# Patient Record
Sex: FEMALE | Race: WHITE | NOT HISPANIC OR LATINO | URBAN - METROPOLITAN AREA
[De-identification: names, ages, dates, MRNs, and addresses within clinical notes are randomized per-mention and may not be internally consistent; named-entity substitution may affect disease eponyms.]

---

## 2017-02-01 ENCOUNTER — APPOINTMENT (RX ONLY)
Dept: URBAN - METROPOLITAN AREA CLINIC 23 | Facility: CLINIC | Age: 72
Setting detail: DERMATOLOGY
End: 2017-02-01

## 2017-02-01 DIAGNOSIS — L82.0 INFLAMED SEBORRHEIC KERATOSIS: ICD-10-CM

## 2017-02-01 DIAGNOSIS — D18.0 HEMANGIOMA: ICD-10-CM

## 2017-02-01 DIAGNOSIS — L82.1 OTHER SEBORRHEIC KERATOSIS: ICD-10-CM

## 2017-02-01 DIAGNOSIS — D22 MELANOCYTIC NEVI: ICD-10-CM

## 2017-02-01 DIAGNOSIS — L81.4 OTHER MELANIN HYPERPIGMENTATION: ICD-10-CM

## 2017-02-01 DIAGNOSIS — Z85.828 PERSONAL HISTORY OF OTHER MALIGNANT NEOPLASM OF SKIN: ICD-10-CM

## 2017-02-01 PROBLEM — I48.91 UNSPECIFIED ATRIAL FIBRILLATION: Status: ACTIVE | Noted: 2017-02-01

## 2017-02-01 PROBLEM — L57.0 ACTINIC KERATOSIS: Status: ACTIVE | Noted: 2017-02-01

## 2017-02-01 PROBLEM — D22.5 MELANOCYTIC NEVI OF TRUNK: Status: ACTIVE | Noted: 2017-02-01

## 2017-02-01 PROBLEM — D18.01 HEMANGIOMA OF SKIN AND SUBCUTANEOUS TISSUE: Status: ACTIVE | Noted: 2017-02-01

## 2017-02-01 PROBLEM — J30.1 ALLERGIC RHINITIS DUE TO POLLEN: Status: ACTIVE | Noted: 2017-02-01

## 2017-02-01 PROCEDURE — ? COUNSELING

## 2017-02-01 PROCEDURE — ? LIQUID NITROGEN

## 2017-02-01 PROCEDURE — 99214 OFFICE O/P EST MOD 30 MIN: CPT | Mod: 25

## 2017-02-01 PROCEDURE — 17110 DESTRUCTION B9 LES UP TO 14: CPT

## 2017-02-01 ASSESSMENT — LOCATION DETAILED DESCRIPTION DERM
LOCATION DETAILED: LEFT PROXIMAL CALF
LOCATION DETAILED: RIGHT DISTAL RADIAL DORSAL FOREARM
LOCATION DETAILED: PERIUMBILICAL SKIN
LOCATION DETAILED: LEFT SUPERIOR MEDIAL MIDBACK
LOCATION DETAILED: RIGHT POSTERIOR SHOULDER
LOCATION DETAILED: LEFT MID-UPPER BACK
LOCATION DETAILED: RIGHT INFERIOR MEDIAL UPPER BACK
LOCATION DETAILED: RIGHT LATERAL ABDOMEN
LOCATION DETAILED: LEFT PROXIMAL DORSAL FOREARM
LOCATION DETAILED: LEFT POSTERIOR SHOULDER
LOCATION DETAILED: RIGHT PROXIMAL CALF
LOCATION DETAILED: EPIGASTRIC SKIN

## 2017-02-01 ASSESSMENT — LOCATION SIMPLE DESCRIPTION DERM
LOCATION SIMPLE: LEFT CALF
LOCATION SIMPLE: LEFT FOREARM
LOCATION SIMPLE: RIGHT UPPER BACK
LOCATION SIMPLE: LEFT UPPER BACK
LOCATION SIMPLE: LEFT SHOULDER
LOCATION SIMPLE: RIGHT SHOULDER
LOCATION SIMPLE: RIGHT FOREARM
LOCATION SIMPLE: ABDOMEN
LOCATION SIMPLE: LEFT LOWER BACK
LOCATION SIMPLE: RIGHT CALF

## 2017-02-01 ASSESSMENT — LOCATION ZONE DERM
LOCATION ZONE: TRUNK
LOCATION ZONE: LEG
LOCATION ZONE: ARM

## 2017-02-01 NOTE — PROCEDURE: LIQUID NITROGEN
Medical Necessity Information: It is in your best interest to select a reason for this procedure from the list below. All of these items fulfill various CMS LCD requirements except the new and changing color options.
Duration Of Freeze Thaw-Cycle (Seconds): 0
Detail Level: Detailed
Medical Necessity Clause: This procedure was medically necessary because the lesions that were treated were:irritated
Consent: The patient's consent was obtained including but not limited to risks of crusting, scabbing, blistering, scarring, darker or lighter pigmentary change, recurrence, incomplete removal and infection.
Post-Care Instructions: I reviewed with the patient in detail post-care instructions. Patient is to wear sunprotection, and avoid picking at any of the treated lesions. Pt may apply Vaseline to crusted or scabbing areas.
Include Z78.9 (Other Specified Conditions Influencing Health Status) As An Associated Diagnosis?: No

## 2017-08-28 ENCOUNTER — HOSPITAL ENCOUNTER (OUTPATIENT)
Dept: GENERAL RADIOLOGY | Age: 72
Discharge: HOME | End: 2017-08-28
Payer: MEDICARE

## 2017-08-28 DIAGNOSIS — J40 BRONCHITIS: ICD-10-CM

## 2017-08-28 PROCEDURE — 71020 XR CHEST PA LAT: CPT

## 2017-10-12 ENCOUNTER — HOSPITAL ENCOUNTER (OUTPATIENT)
Dept: MAMMOGRAPHY | Age: 72
Discharge: HOME | End: 2017-10-12
Attending: OBSTETRICS & GYNECOLOGY
Payer: MEDICARE

## 2017-10-12 DIAGNOSIS — Z12.31 VISIT FOR SCREENING MAMMOGRAM: ICD-10-CM

## 2017-10-12 PROCEDURE — 77067 SCR MAMMO BI INCL CAD: CPT

## 2017-12-05 PROBLEM — G60.9 IDIOPATHIC PERIPHERAL NEUROPATHY: Status: ACTIVE | Noted: 2017-12-05

## 2018-01-31 ENCOUNTER — APPOINTMENT (RX ONLY)
Dept: URBAN - METROPOLITAN AREA CLINIC 23 | Facility: CLINIC | Age: 73
Setting detail: DERMATOLOGY
End: 2018-01-31

## 2018-01-31 DIAGNOSIS — Z85.828 PERSONAL HISTORY OF OTHER MALIGNANT NEOPLASM OF SKIN: ICD-10-CM

## 2018-01-31 DIAGNOSIS — L82.1 OTHER SEBORRHEIC KERATOSIS: ICD-10-CM

## 2018-01-31 DIAGNOSIS — L81.4 OTHER MELANIN HYPERPIGMENTATION: ICD-10-CM

## 2018-01-31 DIAGNOSIS — D485 NEOPLASM OF UNCERTAIN BEHAVIOR OF SKIN: ICD-10-CM

## 2018-01-31 PROBLEM — D48.5 NEOPLASM OF UNCERTAIN BEHAVIOR OF SKIN: Status: ACTIVE | Noted: 2018-01-31

## 2018-01-31 PROBLEM — I48.91 UNSPECIFIED ATRIAL FIBRILLATION: Status: ACTIVE | Noted: 2018-01-31

## 2018-01-31 PROCEDURE — 11101: CPT

## 2018-01-31 PROCEDURE — 99213 OFFICE O/P EST LOW 20 MIN: CPT | Mod: 25

## 2018-01-31 PROCEDURE — ? COUNSELING

## 2018-01-31 PROCEDURE — ? BIOPSY BY SHAVE METHOD

## 2018-01-31 PROCEDURE — 11100: CPT

## 2018-01-31 ASSESSMENT — LOCATION SIMPLE DESCRIPTION DERM
LOCATION SIMPLE: CHEST
LOCATION SIMPLE: RIGHT PRETIBIAL REGION
LOCATION SIMPLE: LEFT FOREARM
LOCATION SIMPLE: RIGHT FOREARM
LOCATION SIMPLE: RIGHT UPPER BACK
LOCATION SIMPLE: LEFT PRETIBIAL REGION

## 2018-01-31 ASSESSMENT — LOCATION DETAILED DESCRIPTION DERM
LOCATION DETAILED: LEFT PROXIMAL PRETIBIAL REGION
LOCATION DETAILED: LEFT PROXIMAL DORSAL FOREARM
LOCATION DETAILED: LEFT DISTAL DORSAL FOREARM
LOCATION DETAILED: RIGHT DISTAL PRETIBIAL REGION
LOCATION DETAILED: RIGHT PROXIMAL PRETIBIAL REGION
LOCATION DETAILED: LEFT LATERAL SUPERIOR CHEST
LOCATION DETAILED: RIGHT PROXIMAL DORSAL FOREARM
LOCATION DETAILED: RIGHT SUPERIOR MEDIAL UPPER BACK
LOCATION DETAILED: MIDDLE STERNUM
LOCATION DETAILED: RIGHT SUPERIOR UPPER BACK

## 2018-01-31 ASSESSMENT — LOCATION ZONE DERM
LOCATION ZONE: LEG
LOCATION ZONE: TRUNK
LOCATION ZONE: ARM

## 2018-01-31 NOTE — PROCEDURE: BIOPSY BY SHAVE METHOD
Electrodesiccation And Curettage Text: The wound bed was treated with electrodesiccation and curettage after the biopsy was performed.
Notification Instructions: Patient will be notified of biopsy results. However, patient instructed to call the office if not contacted within 2 weeks.
Electrodesiccation Text: The wound bed was treated with electrodesiccation after the biopsy was performed.
Billing Type: Third-Party Bill
Post-care instructions were reviewed in detail and written instructions are provided. Patient is to keep the biopsy site dry overnight, and then apply bacitracin twice daily until healed. Patient may apply hydrogen peroxide soaks to remove any crusting.
X Size Of Lesion In Cm: 0
Detail Level: Detailed
Accession #: [id number]
Destruction After The Procedure: No
Cryotherapy Text: The wound bed was treated with cryotherapy after the biopsy was performed.
Dressing: bandage
Silver Nitrate Text: The wound bed was treated with silver nitrate after the biopsy was performed.
Biopsy Type: H and E
Hemostasis: Aluminum Chloride
Anesthesia Volume In Cc: 0.3
Wound Care: Vaseline
Type Of Destruction Used: Curettage
Consent: Written consent was obtained and risks were reviewed including but not limited to scarring, infection, bleeding, scabbing, incomplete removal, and allergy to anesthesia.
Biopsy Method: Dermablade
Anesthesia Type: 1% lidocaine with epinephrine

## 2018-02-26 ENCOUNTER — HOSPITAL ENCOUNTER (OUTPATIENT)
Dept: LAB | Age: 73
Discharge: HOME | End: 2018-02-26
Payer: MEDICARE

## 2018-02-26 LAB
ALBUMIN SERPL-MCNC: 3.3 G/DL (ref 3.2–4.6)
ALBUMIN/GLOB SERPL: 1 {RATIO} (ref 1.2–3.5)
ALP SERPL-CCNC: 61 U/L (ref 50–136)
ALT SERPL-CCNC: 26 U/L (ref 12–65)
ANION GAP SERPL CALC-SCNC: 8 MMOL/L (ref 7–16)
AST SERPL-CCNC: 24 U/L (ref 15–37)
BASOPHILS # BLD: 0 K/UL (ref 0–0.2)
BASOPHILS NFR BLD: 0 % (ref 0–2)
BILIRUB SERPL-MCNC: 0.3 MG/DL (ref 0.2–1.1)
BUN SERPL-MCNC: 14 MG/DL (ref 8–23)
CALCIUM SERPL-MCNC: 8.9 MG/DL (ref 8.3–10.4)
CHLORIDE SERPL-SCNC: 106 MMOL/L (ref 98–107)
CO2 SERPL-SCNC: 27 MMOL/L (ref 21–32)
CREAT SERPL-MCNC: 0.73 MG/DL (ref 0.6–1)
DIFFERENTIAL METHOD BLD: ABNORMAL
EOSINOPHIL # BLD: 0 K/UL (ref 0–0.8)
EOSINOPHIL NFR BLD: 1 % (ref 0.5–7.8)
ERYTHROCYTE [DISTWIDTH] IN BLOOD BY AUTOMATED COUNT: 12.9 % (ref 11.9–14.6)
GLOBULIN SER CALC-MCNC: 3.2 G/DL (ref 2.3–3.5)
GLUCOSE SERPL-MCNC: 95 MG/DL (ref 65–100)
HCT VFR BLD AUTO: 25 % (ref 35.8–46.3)
HGB BLD-MCNC: 8 G/DL (ref 11.7–15.4)
IMM GRANULOCYTES # BLD: 0 K/UL (ref 0–0.5)
IMM GRANULOCYTES NFR BLD AUTO: 0 % (ref 0–5)
LYMPHOCYTES # BLD: 1.5 K/UL (ref 0.5–4.6)
LYMPHOCYTES NFR BLD: 30 % (ref 13–44)
MCH RBC QN AUTO: 30.8 PG (ref 26.1–32.9)
MCHC RBC AUTO-ENTMCNC: 32 G/DL (ref 31.4–35)
MCV RBC AUTO: 96.2 FL (ref 79.6–97.8)
MONOCYTES # BLD: 0.4 K/UL (ref 0.1–1.3)
MONOCYTES NFR BLD: 8 % (ref 4–12)
NEUTS SEG # BLD: 3 K/UL (ref 1.7–8.2)
NEUTS SEG NFR BLD: 61 % (ref 43–78)
PLATELET # BLD AUTO: 265 K/UL (ref 150–450)
PMV BLD AUTO: 9.6 FL (ref 10.8–14.1)
POTASSIUM SERPL-SCNC: 4 MMOL/L (ref 3.5–5.1)
PROT SERPL-MCNC: 6.5 G/DL (ref 6.3–8.2)
RBC # BLD AUTO: 2.6 M/UL (ref 4.05–5.25)
SODIUM SERPL-SCNC: 141 MMOL/L (ref 136–145)
WBC # BLD AUTO: 4.9 K/UL (ref 4.3–11.1)

## 2018-02-26 PROCEDURE — 36415 COLL VENOUS BLD VENIPUNCTURE: CPT | Performed by: INTERNAL MEDICINE

## 2018-02-26 PROCEDURE — 85025 COMPLETE CBC W/AUTO DIFF WBC: CPT | Performed by: INTERNAL MEDICINE

## 2018-02-26 PROCEDURE — 80053 COMPREHEN METABOLIC PANEL: CPT | Performed by: INTERNAL MEDICINE

## 2018-02-27 ENCOUNTER — HOSPITAL ENCOUNTER (OUTPATIENT)
Dept: INFUSION THERAPY | Age: 73
Discharge: HOME | End: 2018-02-27
Payer: MEDICARE

## 2018-02-27 VITALS
TEMPERATURE: 98.3 F | OXYGEN SATURATION: 99 % | SYSTOLIC BLOOD PRESSURE: 97 MMHG | DIASTOLIC BLOOD PRESSURE: 43 MMHG | BODY MASS INDEX: 25.85 KG/M2 | HEART RATE: 80 BPM | RESPIRATION RATE: 18 BRPM | WEIGHT: 170 LBS

## 2018-02-27 PROCEDURE — 36430 TRANSFUSION BLD/BLD COMPNT: CPT

## 2018-02-27 PROCEDURE — 74011250637 HC RX REV CODE- 250/637: Performed by: PHYSICIAN ASSISTANT

## 2018-02-27 PROCEDURE — 86900 BLOOD TYPING SEROLOGIC ABO: CPT | Performed by: INTERNAL MEDICINE

## 2018-02-27 PROCEDURE — 77030018667 ADMN ST IV BLD FENW -A

## 2018-02-27 PROCEDURE — 86870 RBC ANTIBODY IDENTIFICATION: CPT | Performed by: INTERNAL MEDICINE

## 2018-02-27 PROCEDURE — 86905 BLOOD TYPING RBC ANTIGENS: CPT | Performed by: INTERNAL MEDICINE

## 2018-02-27 PROCEDURE — 74011250636 HC RX REV CODE- 250/636: Performed by: INTERNAL MEDICINE

## 2018-02-27 PROCEDURE — P9016 RBC LEUKOCYTES REDUCED: HCPCS | Performed by: INTERNAL MEDICINE

## 2018-02-27 PROCEDURE — 86920 COMPATIBILITY TEST SPIN: CPT | Performed by: INTERNAL MEDICINE

## 2018-02-27 PROCEDURE — 86902 BLOOD TYPE ANTIGEN DONOR EA: CPT | Performed by: INTERNAL MEDICINE

## 2018-02-27 RX ORDER — ACETAMINOPHEN 325 MG/1
650 TABLET ORAL ONCE
Status: COMPLETED | OUTPATIENT
Start: 2018-02-27 | End: 2018-02-27

## 2018-02-27 RX ORDER — DIPHENHYDRAMINE HCL 25 MG
25 CAPSULE ORAL ONCE
Status: COMPLETED | OUTPATIENT
Start: 2018-02-27 | End: 2018-02-27

## 2018-02-27 RX ORDER — SODIUM CHLORIDE 9 MG/ML
250 INJECTION, SOLUTION INTRAVENOUS ONCE
Status: COMPLETED | OUTPATIENT
Start: 2018-02-27 | End: 2018-02-27

## 2018-02-27 RX ADMIN — ACETAMINOPHEN 650 MG: 325 TABLET, FILM COATED ORAL at 13:24

## 2018-02-27 RX ADMIN — SODIUM CHLORIDE 250 ML: 900 INJECTION, SOLUTION INTRAVENOUS at 13:20

## 2018-02-27 RX ADMIN — DIPHENHYDRAMINE HYDROCHLORIDE 25 MG: 25 CAPSULE ORAL at 13:24

## 2018-02-27 NOTE — PROGRESS NOTES
Pt arrived ambulatory today at 0751, to receive one unit of blood. Pt tolerated without difficulty. Patient discharged via ambulatory accompanied by spouse. Instructed to notify physician of any problems, questions or concerns. Allowed opportunity for patient/family to ask questions. Verbalized understanding. Next appointment is Feb 28 at 1000 with Dr. Shubham Salmeron.

## 2018-02-27 NOTE — PROGRESS NOTES
Saw patient in [address] [place] [address], stated no pains or concens for me, declined massage therapy service with appreciation. Gave patient a coupon for complimentary massage. Encouraged patient to call for assistance if needed with understanding expressed.  Thank you for this referral. Angle Grant

## 2018-02-28 LAB
ABO + RH BLD: NORMAL
ANTIGENS PRESENT BLD: NORMAL
ANTIGENS PRESENT RBC DONR: NORMAL
BLD PROD TYP BPU: NORMAL
BLOOD GROUP ANTIBODIES SERPL: NORMAL
BLOOD GROUP ANTIBODIES SERPL: NORMAL
BPU ID: NORMAL
CROSSMATCH RESULT,%XM: NORMAL
SPECIMEN EXP DATE BLD: NORMAL
STATUS OF UNIT,%ST: NORMAL
UNIT DIVISION, %UDIV: 0

## 2018-03-16 ENCOUNTER — APPOINTMENT (RX ONLY)
Dept: URBAN - METROPOLITAN AREA CLINIC 23 | Facility: CLINIC | Age: 73
Setting detail: DERMATOLOGY
End: 2018-03-16

## 2018-03-16 DIAGNOSIS — L57.0 ACTINIC KERATOSIS: ICD-10-CM

## 2018-03-16 PROBLEM — D04.71 CARCINOMA IN SITU OF SKIN OF RIGHT LOWER LIMB, INCLUDING HIP: Status: ACTIVE | Noted: 2018-03-16

## 2018-03-16 PROBLEM — Z85.828 PERSONAL HISTORY OF OTHER MALIGNANT NEOPLASM OF SKIN: Status: ACTIVE | Noted: 2018-03-16

## 2018-03-16 PROCEDURE — ? EXCISION

## 2018-03-16 PROCEDURE — 12032 INTMD RPR S/A/T/EXT 2.6-7.5: CPT

## 2018-03-16 PROCEDURE — 11602 EXC TR-EXT MAL+MARG 1.1-2 CM: CPT

## 2018-03-16 PROCEDURE — ? OTHER

## 2018-03-16 ASSESSMENT — LOCATION DETAILED DESCRIPTION DERM: LOCATION DETAILED: LEFT PROXIMAL PRETIBIAL REGION

## 2018-03-16 ASSESSMENT — LOCATION ZONE DERM: LOCATION ZONE: LEG

## 2018-03-16 ASSESSMENT — LOCATION SIMPLE DESCRIPTION DERM: LOCATION SIMPLE: LEFT PRETIBIAL REGION

## 2018-03-16 NOTE — PROCEDURE: EXCISION
Bilobed Flap Text: The defect edges were debeveled with a #15 scalpel blade.  Given the location of the defect and the proximity to free margins a bilobe flap was deemed most appropriate.  Using a sterile surgical marker, an appropriate bilobe flap drawn around the defect.    The area thus outlined was incised deep to adipose tissue with a #15 scalpel blade.  The skin margins were undermined to an appropriate distance in all directions utilizing iris scissors.
Complex Repair And Z Plasty Text: The defect edges were debeveled with a #15 scalpel blade.  The primary defect was closed partially with a complex linear closure.  Given the location of the remaining defect, shape of the defect and the proximity to free margins a Z plasty was deemed most appropriate for complete closure of the defect.  Using a sterile surgical marker, an appropriate advancement flap was drawn incorporating the defect and placing the expected incisions within the relaxed skin tension lines where possible.    The area thus outlined was incised deep to adipose tissue with a #15 scalpel blade.  The skin margins were undermined to an appropriate distance in all directions utilizing iris scissors.
Curvilinear Excision Additional Text (Leave Blank If You Do Not Want): The margin was drawn around the clinically apparent lesion.  A curvilinear shape was then drawn on the skin incorporating the lesion and margins.  Incisions were then made along these lines to the appropriate tissue plane and the lesion was extirpated.
Show Date Of Previous Biopsy Variable: Yes
Keystone Flap Text: The defect edges were debeveled with a #15 scalpel blade.  Given the location of the defect, shape of the defect a keystone flap was deemed most appropriate.  Using a sterile surgical marker, an appropriate keystone flap was drawn incorporating the defect, outlining the appropriate donor tissue and placing the expected incisions within the relaxed skin tension lines where possible. The area thus outlined was incised deep to adipose tissue with a #15 scalpel blade.  The skin margins were undermined to an appropriate distance in all directions around the primary defect and laterally outward around the flap utilizing iris scissors.
Bilobed Transposition Flap Text: The defect edges were debeveled with a #15 scalpel blade.  Given the location of the defect and the proximity to free margins a bilobed transposition flap was deemed most appropriate.  Using a sterile surgical marker, an appropriate bilobe flap drawn around the defect.    The area thus outlined was incised deep to adipose tissue with a #15 scalpel blade.  The skin margins were undermined to an appropriate distance in all directions utilizing iris scissors.
Anesthesia Type: 1% lidocaine with 1:100,000 epinephrine and 408mcg clindamycin/ml and a 1:10 solution of 8.4% sodium bicarbonate
Burow's Advancement Flap Text: The defect edges were debeveled with a #15 scalpel blade.  Given the location of the defect and the proximity to free margins a Burow's advancement flap was deemed most appropriate.  Using a sterile surgical marker, the appropriate advancement flap was drawn incorporating the defect and placing the expected incisions within the relaxed skin tension lines where possible.    The area thus outlined was incised deep to adipose tissue with a #15 scalpel blade.  The skin margins were undermined to an appropriate distance in all directions utilizing iris scissors.
No Repair - Repaired With Adjacent Surgical Defect Text (Leave Blank If You Do Not Want): After the excision the defect was repaired concurrently with another surgical defect which was in close approximation.
O-L Flap Text: The defect edges were debeveled with a #15 scalpel blade.  Given the location of the defect, shape of the defect and the proximity to free margins an O-L flap was deemed most appropriate.  Using a sterile surgical marker, an appropriate advancement flap was drawn incorporating the defect and placing the expected incisions within the relaxed skin tension lines where possible.    The area thus outlined was incised deep to adipose tissue with a #15 scalpel blade.  The skin margins were undermined to an appropriate distance in all directions utilizing iris scissors.
O-Z Plasty Text: The defect edges were debeveled with a #15 scalpel blade.  Given the location of the defect, shape of the defect and the proximity to free margins an O-Z plasty (double transposition flap) was deemed most appropriate.  Using a sterile surgical marker, the appropriate transposition flaps were drawn incorporating the defect and placing the expected incisions within the relaxed skin tension lines where possible.    The area thus outlined was incised deep to adipose tissue with a #15 scalpel blade.  The skin margins were undermined to an appropriate distance in all directions utilizing iris scissors.  Hemostasis was achieved with electrocautery.  The flaps were then transposed into place, one clockwise and the other counterclockwise, and anchored with interrupted buried subcutaneous sutures.
Anesthesia Volume In Cc: 5
O-T Advancement Flap Text: The defect edges were debeveled with a #15 scalpel blade.  Given the location of the defect, shape of the defect and the proximity to free margins an O-T advancement flap was deemed most appropriate.  Using a sterile surgical marker, an appropriate advancement flap was drawn incorporating the defect and placing the expected incisions within the relaxed skin tension lines where possible.    The area thus outlined was incised deep to adipose tissue with a #15 scalpel blade.  The skin margins were undermined to an appropriate distance in all directions utilizing iris scissors.
Hatchet Flap Text: The defect edges were debeveled with a #15 scalpel blade.  Given the location of the defect, shape of the defect and the proximity to free margins a hatchet flap was deemed most appropriate.  Using a sterile surgical marker, an appropriate hatchet flap was drawn incorporating the defect and placing the expected incisions within the relaxed skin tension lines where possible.    The area thus outlined was incised deep to adipose tissue with a #15 scalpel blade.  The skin margins were undermined to an appropriate distance in all directions utilizing iris scissors.
Complex Repair And Rhombic Flap Text: The defect edges were debeveled with a #15 scalpel blade.  The primary defect was closed partially with a complex linear closure.  Given the location of the remaining defect, shape of the defect and the proximity to free margins a rhombic flap was deemed most appropriate for complete closure of the defect.  Using a sterile surgical marker, an appropriate advancement flap was drawn incorporating the defect and placing the expected incisions within the relaxed skin tension lines where possible.    The area thus outlined was incised deep to adipose tissue with a #15 scalpel blade.  The skin margins were undermined to an appropriate distance in all directions utilizing iris scissors.
Lazy S Complex Repair Preamble Text (Leave Blank If You Do Not Want): Extensive wide undermining was performed.
Lazy S Intermediate Repair Preamble Text (Leave Blank If You Do Not Want): Undermining was performed with blunt dissection.
Size Of Lesion In Cm: 0.7
Secondary Defect Width (In Cm): 0
Epidermal Sutures: 4-0 Prolene
Slit Excision Additional Text (Leave Blank If You Do Not Want): A linear line was drawn on the skin overlying the lesion. An incision was made slowly until the lesion was visualized.  Once visualized, the lesion was removed with blunt dissection.
Epidermal Closure Graft Donor Site (Optional): simple interrupted
Excision Depth: adipose tissue
Lip Wedge Excision Repair Text: Given the location of the defect and the proximity to free margins a full thickness wedge repair was deemed most appropriate.  Using a sterile surgical marker, the appropriate repair was drawn incorporating the defect and placing the expected incisions perpendicular to the vermillion border.  The vermillion border was also meticulously outlined to ensure appropriate reapproximation during the repair.  The area thus outlined was incised through and through with a #15 scalpel blade.  The muscularis and dermis were reaproximated with deep sutures following hemostasis. Care was taken to realign the vermillion border before proceeding with the superficial closure.  Once the vermillion was realigned the superfical and mucosal closure was finished.
Repair Type: Intermediate
Cartilage Graft Text: The defect edges were debeveled with a #15 scalpel blade.  Given the location of the defect, shape of the defect, the fact the defect involved a full thickness cartilage defect a cartilage graft was deemed most appropriate.  An appropriate donor site was identified, cleansed, and anesthetized. The cartilage graft was then harvested and transferred to the recipient site, oriented appropriately and then sutured into place.  The secondary defect was then repaired using a primary closure.
Complex Repair And O-L Flap Text: The defect edges were debeveled with a #15 scalpel blade.  The primary defect was closed partially with a complex linear closure.  Given the location of the remaining defect, shape of the defect and the proximity to free margins an O-L flap was deemed most appropriate for complete closure of the defect.  Using a sterile surgical marker, an appropriate flap was drawn incorporating the defect and placing the expected incisions within the relaxed skin tension lines where possible.    The area thus outlined was incised deep to adipose tissue with a #15 scalpel blade.  The skin margins were undermined to an appropriate distance in all directions utilizing iris scissors.
Composite Graft Text: The defect edges were debeveled with a #15 scalpel blade.  Given the location of the defect, shape of the defect, the proximity to free margins and the fact the defect was full thickness a composite graft was deemed most appropriate.  The defect was outline and then transferred to the donor site.  A full thickness graft was then excised from the donor site. The graft was then placed in the primary defect, oriented appropriately and then sutured into place.  The secondary defect was then repaired using a primary closure.
Home Suture Removal Text: Patient was provided a home suture removal kit and will remove their sutures at home.  If they have any questions or difficulties they will call the office.
Mastoid Interpolation Flap Text: A decision was made to reconstruct the defect utilizing an interpolation axial flap and a staged reconstruction.  A telfa template was made of the defect.  This telfa template was then used to outline the mastoid interpolation flap.  The donor area for the pedicle flap was then injected with anesthesia.  The flap was excised through the skin and subcutaneous tissue down to the layer of the underlying musculature.  The pedicle flap was carefully excised within this deep plane to maintain its blood supply.  The edges of the donor site were undermined.   The donor site was closed in a primary fashion.  The pedicle was then rotated into position and sutured.  Once the tube was sutured into place, adequate blood supply was confirmed with blanching and refill.  The pedicle was then wrapped with xeroform gauze and dressed appropriately with a telfa and gauze bandage to ensure continued blood supply and protect the attached pedicle.
Patient Will Remove Sutures At Home?: No
Helical Rim Advancement Flap Text: The defect edges were debeveled with a #15 blade scalpel.  Given the location of the defect and the proximity to free margins (helical rim) a double helical rim advancement flap was deemed most appropriate.  Using a sterile surgical marker, the appropriate advancement flaps were drawn incorporating the defect and placing the expected incisions between the helical rim and antihelix where possible.  The area thus outlined was incised through and through with a #15 scalpel blade.  With a skin hook and iris scissors, the flaps were gently and sharply undermined and freed up.
Complex Repair And Rotation Flap Text: The defect edges were debeveled with a #15 scalpel blade.  The primary defect was closed partially with a complex linear closure.  Given the location of the remaining defect, shape of the defect and the proximity to free margins a rotation flap was deemed most appropriate for complete closure of the defect.  Using a sterile surgical marker, an appropriate advancement flap was drawn incorporating the defect and placing the expected incisions within the relaxed skin tension lines where possible.    The area thus outlined was incised deep to adipose tissue with a #15 scalpel blade.  The skin margins were undermined to an appropriate distance in all directions utilizing iris scissors.
Partial Purse String (Intermediate) Text: Given the location of the defect and the characteristics of the surrounding skin an intermediate purse string closure was deemed most appropriate.  Undermining was performed circumferentially around the surgical defect.  A purse string suture was then placed and tightened. Wound tension of the circular defect prevented complete closure of the wound.
Dermal Autograft Text: The defect edges were debeveled with a #15 scalpel blade.  Given the location of the defect, shape of the defect and the proximity to free margins a dermal autograft was deemed most appropriate.  Using a sterile surgical marker, the primary defect shape was transferred to the donor site. The area thus outlined was incised deep to adipose tissue with a #15 scalpel blade.  The harvested graft was then trimmed of adipose and epidermal tissue until only dermis was left.  The skin graft was then placed in the primary defect and oriented appropriately.
Muscle Hinge Flap Text: The defect edges were debeveled with a #15 scalpel blade.  Given the size, depth and location of the defect and the proximity to free margins a muscle hinge flap was deemed most appropriate.  Using a sterile surgical marker, an appropriate hinge flap was drawn incorporating the defect. The area thus outlined was incised with a #15 scalpel blade.  The skin margins were undermined to an appropriate distance in all directions utilizing iris scissors.
Star Wedge Flap Text: The defect edges were debeveled with a #15 scalpel blade.  Given the location of the defect, shape of the defect and the proximity to free margins a star wedge flap was deemed most appropriate.  Using a sterile surgical marker, an appropriate rotation flap was drawn incorporating the defect and placing the expected incisions within the relaxed skin tension lines where possible. The area thus outlined was incised deep to adipose tissue with a #15 scalpel blade.  The skin margins were undermined to an appropriate distance in all directions utilizing iris scissors.
Z Plasty Text: The lesion was extirpated to the level of the fat with a #15 scalpel blade.  Given the location of the defect, shape of the defect and the proximity to free margins a Z-plasty was deemed most appropriate for repair.  Using a sterile surgical marker, the appropriate transposition arms of the Z-plasty were drawn incorporating the defect and placing the expected incisions within the relaxed skin tension lines where possible.    The area thus outlined was incised deep to adipose tissue with a #15 scalpel blade.  The skin margins were undermined to an appropriate distance in all directions utilizing iris scissors.  The opposing transposition arms were then transposed into place in opposite direction and anchored with interrupted buried subcutaneous sutures.
Bilateral Helical Rim Advancement Flap Text: The defect edges were debeveled with a #15 blade scalpel.  Given the location of the defect and the proximity to free margins (helical rim) a bilateral helical rim advancement flap was deemed most appropriate.  Using a sterile surgical marker, the appropriate advancement flaps were drawn incorporating the defect and placing the expected incisions between the helical rim and antihelix where possible.  The area thus outlined was incised through and through with a #15 scalpel blade.  With a skin hook and iris scissors, the flaps were gently and sharply undermined and freed up.
Mucosal Advancement Flap Text: Given the location of the defect, shape of the defect and the proximity to free margins a mucosal advancement flap was deemed most appropriate. Incisions were made with a 15 blade scalpel in the appropriate fashion along the cutaneous vermillion border and the mucosal lip. The remaining actinically damaged mucosal tissue was excised.  The mucosal advancement flap was then elevated to the gingival sulcus with care taken to preserve the neurovascular structures and advanced into the primary defect. Care was taken to ensure that precise realignment of the vermillion border was achieved.
Complex Repair And Epidermal Autograft Text: The defect edges were debeveled with a #15 scalpel blade.  The primary defect was closed partially with a complex linear closure.  Given the location of the defect, shape of the defect and the proximity to free margins an epidermal autograft was deemed most appropriate to repair the remaining defect.  The graft was trimmed to fit the size of the remaining defect.  The graft was then placed in the primary defect, oriented appropriately, and sutured into place.
V-Y Flap Text: The defect edges were debeveled with a #15 scalpel blade.  Given the location of the defect, shape of the defect and the proximity to free margins a V-Y flap was deemed most appropriate.  Using a sterile surgical marker, an appropriate advancement flap was drawn incorporating the defect and placing the expected incisions within the relaxed skin tension lines where possible.    The area thus outlined was incised deep to adipose tissue with a #15 scalpel blade.  The skin margins were undermined to an appropriate distance in all directions utilizing iris scissors.
Detail Level: Detailed
Complex Repair And M Plasty Text: The defect edges were debeveled with a #15 scalpel blade.  The primary defect was closed partially with a complex linear closure.  Given the location of the remaining defect, shape of the defect and the proximity to free margins an M plasty was deemed most appropriate for complete closure of the defect.  Using a sterile surgical marker, an appropriate advancement flap was drawn incorporating the defect and placing the expected incisions within the relaxed skin tension lines where possible.    The area thus outlined was incised deep to adipose tissue with a #15 scalpel blade.  The skin margins were undermined to an appropriate distance in all directions utilizing iris scissors.
Hemostasis: Electrocautery
Paramedian Forehead Flap Text: A decision was made to reconstruct the defect utilizing an interpolation axial flap and a staged reconstruction.  A telfa template was made of the defect.  This telfa template was then used to outline the paramedian forehead pedicle flap.  The donor area for the pedicle flap was then injected with anesthesia.  The flap was excised through the skin and subcutaneous tissue down to the layer of the underlying musculature.  The pedicle flap was carefully excised within this deep plane to maintain its blood supply.  The edges of the donor site were undermined.   The donor site was closed in a primary fashion.  The pedicle was then rotated into position and sutured.  Once the tube was sutured into place, adequate blood supply was confirmed with blanching and refill.  The pedicle was then wrapped with xeroform gauze and dressed appropriately with a telfa and gauze bandage to ensure continued blood supply and protect the attached pedicle.
Advancement Flap (Double) Text: The defect edges were debeveled with a #15 scalpel blade.  Given the location of the defect and the proximity to free margins a double advancement flap was deemed most appropriate.  Using a sterile surgical marker, the appropriate advancement flaps were drawn incorporating the defect and placing the expected incisions within the relaxed skin tension lines where possible.    The area thus outlined was incised deep to adipose tissue with a #15 scalpel blade.  The skin margins were undermined to an appropriate distance in all directions utilizing iris scissors.
O-T Plasty Text: The defect edges were debeveled with a #15 scalpel blade.  Given the location of the defect, shape of the defect and the proximity to free margins an O-T plasty was deemed most appropriate.  Using a sterile surgical marker, an appropriate O-T plasty was drawn incorporating the defect and placing the expected incisions within the relaxed skin tension lines where possible.    The area thus outlined was incised deep to adipose tissue with a #15 scalpel blade.  The skin margins were undermined to an appropriate distance in all directions utilizing iris scissors.
Spiral Flap Text: The defect edges were debeveled with a #15 scalpel blade.  Given the location of the defect, shape of the defect and the proximity to free margins a spiral flap was deemed most appropriate.  Using a sterile surgical marker, an appropriate rotation flap was drawn incorporating the defect and placing the expected incisions within the relaxed skin tension lines where possible. The area thus outlined was incised deep to adipose tissue with a #15 scalpel blade.  The skin margins were undermined to an appropriate distance in all directions utilizing iris scissors.
Pre-Excision Curettage Text (Leave Blank If You Do Not Want): Prior to drawing the surgical margin the visible lesion was removed with electrodesiccation and curettage to clearly define the lesion size.
Accession #: [id number]
Tissue Cultured Epidermal Autograft Text: The defect edges were debeveled with a #15 scalpel blade.  Given the location of the defect, shape of the defect and the proximity to free margins a tissue cultured epidermal autograft was deemed most appropriate.  The graft was then trimmed to fit the size of the defect.  The graft was then placed in the primary defect and oriented appropriately.
Size Of Margin In Cm: 0.2
Epidermal Closure: running
Suture Removal: 14 days
Island Pedicle Flap Text: The defect edges were debeveled with a #15 scalpel blade.  Given the location of the defect, shape of the defect and the proximity to free margins an island pedicle advancement flap was deemed most appropriate.  Using a sterile surgical marker, an appropriate advancement flap was drawn incorporating the defect, outlining the appropriate donor tissue and placing the expected incisions within the relaxed skin tension lines where possible.    The area thus outlined was incised deep to adipose tissue with a #15 scalpel blade.  The skin margins were undermined to an appropriate distance in all directions around the primary defect and laterally outward around the island pedicle utilizing iris scissors.  There was minimal undermining beneath the pedicle flap.
Excision Method: Elliptical
Complex Repair And O-T Advancement Flap Text: The defect edges were debeveled with a #15 scalpel blade.  The primary defect was closed partially with a complex linear closure.  Given the location of the remaining defect, shape of the defect and the proximity to free margins an O-T advancement flap was deemed most appropriate for complete closure of the defect.  Using a sterile surgical marker, an appropriate advancement flap was drawn incorporating the defect and placing the expected incisions within the relaxed skin tension lines where possible.    The area thus outlined was incised deep to adipose tissue with a #15 scalpel blade.  The skin margins were undermined to an appropriate distance in all directions utilizing iris scissors.
Crescentic Advancement Flap Text: The defect edges were debeveled with a #15 scalpel blade.  Given the location of the defect and the proximity to free margins a crescentic advancement flap was deemed most appropriate.  Using a sterile surgical marker, the appropriate advancement flap was drawn incorporating the defect and placing the expected incisions within the relaxed skin tension lines where possible.    The area thus outlined was incised deep to adipose tissue with a #15 scalpel blade.  The skin margins were undermined to an appropriate distance in all directions utilizing iris scissors.
Positioning (Leave Blank If You Do Not Want): The patient was placed in a comfortable position exposing the surgical site.
Complex Repair And Xenograft Text: The defect edges were debeveled with a #15 scalpel blade.  The primary defect was closed partially with a complex linear closure.  Given the location of the defect, shape of the defect and the proximity to free margins an tissue cultured epidermal autograft was deemed most appropriate to repair the remaining defect.  The graft was trimmed to fit the size of the remaining defect.  The graft was then placed in the primary defect, oriented appropriately, and sutured into place.
Dorsal Nasal Flap Text: The defect edges were debeveled with a #15 scalpel blade.  Given the location of the defect and the proximity to free margins a dorsal nasal flap was deemed most appropriate.  Using a sterile surgical marker, an appropriate dorsal nasal flap was drawn around the defect.    The area thus outlined was incised deep to adipose tissue with a #15 scalpel blade.  The skin margins were undermined to an appropriate distance in all directions utilizing iris scissors.
Melolabial Transposition Flap Text: The defect edges were debeveled with a #15 scalpel blade.  Given the location of the defect and the proximity to free margins a melolabial flap was deemed most appropriate.  Using a sterile surgical marker, an appropriate melolabial transposition flap was drawn incorporating the defect.    The area thus outlined was incised deep to adipose tissue with a #15 scalpel blade.  The skin margins were undermined to an appropriate distance in all directions utilizing iris scissors.
Complex Repair And Transposition Flap Text: The defect edges were debeveled with a #15 scalpel blade.  The primary defect was closed partially with a complex linear closure.  Given the location of the remaining defect, shape of the defect and the proximity to free margins a transposition flap was deemed most appropriate for complete closure of the defect.  Using a sterile surgical marker, an appropriate advancement flap was drawn incorporating the defect and placing the expected incisions within the relaxed skin tension lines where possible.    The area thus outlined was incised deep to adipose tissue with a #15 scalpel blade.  The skin margins were undermined to an appropriate distance in all directions utilizing iris scissors.
Elliptical Excision Additional Text (Leave Blank If You Do Not Want): The margin was drawn around the clinically apparent lesion.  An elliptical shape was then drawn on the skin incorporating the lesion and margins.  Incisions were then made along these lines to the appropriate tissue plane and the lesion was extirpated.
Complex Repair And Ftsg Text: The defect edges were debeveled with a #15 scalpel blade.  The primary defect was closed partially with a complex linear closure.  Given the location of the defect, shape of the defect and the proximity to free margins a full thickness skin graft was deemed most appropriate to repair the remaining defect.  The graft was trimmed to fit the size of the remaining defect.  The graft was then placed in the primary defect, oriented appropriately, and sutured into place.
Cheek Interpolation Flap Text: A decision was made to reconstruct the defect utilizing an interpolation axial flap and a staged reconstruction.  A telfa template was made of the defect.  This telfa template was then used to outline the Cheek Interpolation flap.  The donor area for the pedicle flap was then injected with anesthesia.  The flap was excised through the skin and subcutaneous tissue down to the layer of the underlying musculature.  The interpolation flap was carefully excised within this deep plane to maintain its blood supply.  The edges of the donor site were undermined.   The donor site was closed in a primary fashion.  The pedicle was then rotated into position and sutured.  Once the tube was sutured into place, adequate blood supply was confirmed with blanching and refill.  The pedicle was then wrapped with xeroform gauze and dressed appropriately with a telfa and gauze bandage to ensure continued blood supply and protect the attached pedicle.
Posterior Auricular Interpolation Flap Text: A decision was made to reconstruct the defect utilizing an interpolation axial flap and a staged reconstruction.  A telfa template was made of the defect.  This telfa template was then used to outline the posterior auricular interpolation flap.  The donor area for the pedicle flap was then injected with anesthesia.  The flap was excised through the skin and subcutaneous tissue down to the layer of the underlying musculature.  The pedicle flap was carefully excised within this deep plane to maintain its blood supply.  The edges of the donor site were undermined.   The donor site was closed in a primary fashion.  The pedicle was then rotated into position and sutured.  Once the tube was sutured into place, adequate blood supply was confirmed with blanching and refill.  The pedicle was then wrapped with xeroform gauze and dressed appropriately with a telfa and gauze bandage to ensure continued blood supply and protect the attached pedicle.
Island Pedicle Flap-Requiring Vessel Identification Text: The defect edges were debeveled with a #15 scalpel blade.  Given the location of the defect, shape of the defect and the proximity to free margins an island pedicle advancement flap was deemed most appropriate.  Using a sterile surgical marker, an appropriate advancement flap was drawn, based on the axial vessel mentioned above, incorporating the defect, outlining the appropriate donor tissue and placing the expected incisions within the relaxed skin tension lines where possible.    The area thus outlined was incised deep to adipose tissue with a #15 scalpel blade.  The skin margins were undermined to an appropriate distance in all directions around the primary defect and laterally outward around the island pedicle utilizing iris scissors.  There was minimal undermining beneath the pedicle flap.
Graft Donor Site Bandage (Optional-Leave Blank If You Don't Want In Note): Steri-strips and a pressure bandage were applied to the donor site.
Complex Repair And Double Advancement Flap Text: The defect edges were debeveled with a #15 scalpel blade.  The primary defect was closed partially with a complex linear closure.  Given the location of the remaining defect, shape of the defect and the proximity to free margins a double advancement flap was deemed most appropriate for complete closure of the defect.  Using a sterile surgical marker, an appropriate advancement flap was drawn incorporating the defect and placing the expected incisions within the relaxed skin tension lines where possible.    The area thus outlined was incised deep to adipose tissue with a #15 scalpel blade.  The skin margins were undermined to an appropriate distance in all directions utilizing iris scissors.
Complex Repair And W Plasty Text: The defect edges were debeveled with a #15 scalpel blade.  The primary defect was closed partially with a complex linear closure.  Given the location of the remaining defect, shape of the defect and the proximity to free margins a W plasty was deemed most appropriate for complete closure of the defect.  Using a sterile surgical marker, an appropriate advancement flap was drawn incorporating the defect and placing the expected incisions within the relaxed skin tension lines where possible.    The area thus outlined was incised deep to adipose tissue with a #15 scalpel blade.  The skin margins were undermined to an appropriate distance in all directions utilizing iris scissors.
Rotation Flap Text: The defect edges were debeveled with a #15 scalpel blade.  Given the location of the defect, shape of the defect and the proximity to free margins a rotation flap was deemed most appropriate.  Using a sterile surgical marker, an appropriate rotation flap was drawn incorporating the defect and placing the expected incisions within the relaxed skin tension lines where possible.    The area thus outlined was incised deep to adipose tissue with a #15 scalpel blade.  The skin margins were undermined to an appropriate distance in all directions utilizing iris scissors.
Double Island Pedicle Flap Text: The defect edges were debeveled with a #15 scalpel blade.  Given the location of the defect, shape of the defect and the proximity to free margins a double island pedicle advancement flap was deemed most appropriate.  Using a sterile surgical marker, an appropriate advancement flap was drawn incorporating the defect, outlining the appropriate donor tissue and placing the expected incisions within the relaxed skin tension lines where possible.    The area thus outlined was incised deep to adipose tissue with a #15 scalpel blade.  The skin margins were undermined to an appropriate distance in all directions around the primary defect and laterally outward around the island pedicle utilizing iris scissors.  There was minimal undermining beneath the pedicle flap.
W Plasty Text: The lesion was extirpated to the level of the fat with a #15 scalpel blade.  Given the location of the defect, shape of the defect and the proximity to free margins a W-plasty was deemed most appropriate for repair.  Using a sterile surgical marker, the appropriate transposition arms of the W-plasty were drawn incorporating the defect and placing the expected incisions within the relaxed skin tension lines where possible.    The area thus outlined was incised deep to adipose tissue with a #15 scalpel blade.  The skin margins were undermined to an appropriate distance in all directions utilizing iris scissors.  The opposing transposition arms were then transposed into place in opposite direction and anchored with interrupted buried subcutaneous sutures.
Bi-Rhombic Flap Text: The defect edges were debeveled with a #15 scalpel blade.  Given the location of the defect and the proximity to free margins a bi-rhombic flap was deemed most appropriate.  Using a sterile surgical marker, an appropriate rhombic flap was drawn incorporating the defect. The area thus outlined was incised deep to adipose tissue with a #15 scalpel blade.  The skin margins were undermined to an appropriate distance in all directions utilizing iris scissors.
Transposition Flap Text: The defect edges were debeveled with a #15 scalpel blade.  Given the location of the defect and the proximity to free margins a transposition flap was deemed most appropriate.  Using a sterile surgical marker, an appropriate transposition flap was drawn incorporating the defect.    The area thus outlined was incised deep to adipose tissue with a #15 scalpel blade.  The skin margins were undermined to an appropriate distance in all directions utilizing iris scissors.
Epidermal Autograft Text: The defect edges were debeveled with a #15 scalpel blade.  Given the location of the defect, shape of the defect and the proximity to free margins an epidermal autograft was deemed most appropriate.  Using a sterile surgical marker, the primary defect shape was transferred to the donor site. The epidermal graft was then harvested.  The skin graft was then placed in the primary defect and oriented appropriately.
Consent was obtained from the patient. The risks and benefits to therapy were discussed in detail. Specifically, the risks of infection, scarring, bleeding, prolonged wound healing, incomplete removal, allergy to anesthesia, nerve injury and recurrence were addressed. Prior to the procedure, the treatment site was clearly identified and confirmed by the patient. All components of Universal Protocol/PAUSE Rule completed.
Deep Sutures: 4-0 Vicryl
Complex Repair And A-T Advancement Flap Text: The defect edges were debeveled with a #15 scalpel blade.  The primary defect was closed partially with a complex linear closure.  Given the location of the remaining defect, shape of the defect and the proximity to free margins an A-T advancement flap was deemed most appropriate for complete closure of the defect.  Using a sterile surgical marker, an appropriate advancement flap was drawn incorporating the defect and placing the expected incisions within the relaxed skin tension lines where possible.    The area thus outlined was incised deep to adipose tissue with a #15 scalpel blade.  The skin margins were undermined to an appropriate distance in all directions utilizing iris scissors.
Partial Purse String (Simple) Text: Given the location of the defect and the characteristics of the surrounding skin a simple purse string closure was deemed most appropriate.  Undermining was performed circumferentially around the surgical defect.  A purse string suture was then placed and tightened. Wound tension of the circular defect prevented complete closure of the wound.
H Plasty Text: Given the location of the defect, shape of the defect and the proximity to free margins a H-plasty was deemed most appropriate for repair.  Using a sterile surgical marker, the appropriate advancement arms of the H-plasty were drawn incorporating the defect and placing the expected incisions within the relaxed skin tension lines where possible. The area thus outlined was incised deep to adipose tissue with a #15 scalpel blade. The skin margins were undermined to an appropriate distance in all directions utilizing iris scissors.  The opposing advancement arms were then advanced into place in opposite direction and anchored with interrupted buried subcutaneous sutures.
Complex Repair And Single Advancement Flap Text: The defect edges were debeveled with a #15 scalpel blade.  The primary defect was closed partially with a complex linear closure.  Given the location of the remaining defect, shape of the defect and the proximity to free margins a single advancement flap was deemed most appropriate for complete closure of the defect.  Using a sterile surgical marker, an appropriate advancement flap was drawn incorporating the defect and placing the expected incisions within the relaxed skin tension lines where possible.    The area thus outlined was incised deep to adipose tissue with a #15 scalpel blade.  The skin margins were undermined to an appropriate distance in all directions utilizing iris scissors.
Purse String (Intermediate) Text: Given the location of the defect and the characteristics of the surrounding skin a pursestring intermediate closure was deemed most appropriate.  Undermining was performed circumfirentially around the surgical defect.  A purstring suture was then placed and tightened.
Wound Care: Vaseline
Complex Repair And Double M Plasty Text: The defect edges were debeveled with a #15 scalpel blade.  The primary defect was closed partially with a complex linear closure.  Given the location of the remaining defect, shape of the defect and the proximity to free margins a double M plasty was deemed most appropriate for complete closure of the defect.  Using a sterile surgical marker, an appropriate advancement flap was drawn incorporating the defect and placing the expected incisions within the relaxed skin tension lines where possible.    The area thus outlined was incised deep to adipose tissue with a #15 scalpel blade.  The skin margins were undermined to an appropriate distance in all directions utilizing iris scissors.
Complex Repair And V-Y Plasty Text: The defect edges were debeveled with a #15 scalpel blade.  The primary defect was closed partially with a complex linear closure.  Given the location of the remaining defect, shape of the defect and the proximity to free margins a V-Y plasty was deemed most appropriate for complete closure of the defect.  Using a sterile surgical marker, an appropriate advancement flap was drawn incorporating the defect and placing the expected incisions within the relaxed skin tension lines where possible.    The area thus outlined was incised deep to adipose tissue with a #15 scalpel blade.  The skin margins were undermined to an appropriate distance in all directions utilizing iris scissors.
Skin Substitute Text: The defect edges were debeveled with a #15 scalpel blade.  Given the location of the defect, shape of the defect and the proximity to free margins a skin substitute graft was deemed most appropriate.  The graft material was trimmed to fit the size of the defect. The graft was then placed in the primary defect and oriented appropriately.
Modified Advancement Flap Text: The defect edges were debeveled with a #15 scalpel blade.  Given the location of the defect, shape of the defect and the proximity to free margins a modified advancement flap was deemed most appropriate.  Using a sterile surgical marker, an appropriate advancement flap was drawn incorporating the defect and placing the expected incisions within the relaxed skin tension lines where possible.    The area thus outlined was incised deep to adipose tissue with a #15 scalpel blade.  The skin margins were undermined to an appropriate distance in all directions utilizing iris scissors.
Complex Repair And Split-Thickness Skin Graft Text: The defect edges were debeveled with a #15 scalpel blade.  The primary defect was closed partially with a complex linear closure.  Given the location of the defect, shape of the defect and the proximity to free margins a split thickness skin graft was deemed most appropriate to repair the remaining defect.  The graft was trimmed to fit the size of the remaining defect.  The graft was then placed in the primary defect, oriented appropriately, and sutured into place.
Melolabial Interpolation Flap Text: A decision was made to reconstruct the defect utilizing an interpolation axial flap and a staged reconstruction.  A telfa template was made of the defect.  This telfa template was then used to outline the melolabial interpolation flap.  The donor area for the pedicle flap was then injected with anesthesia.  The flap was excised through the skin and subcutaneous tissue down to the layer of the underlying musculature.  The pedicle flap was carefully excised within this deep plane to maintain its blood supply.  The edges of the donor site were undermined.   The donor site was closed in a primary fashion.  The pedicle was then rotated into position and sutured.  Once the tube was sutured into place, adequate blood supply was confirmed with blanching and refill.  The pedicle was then wrapped with xeroform gauze and dressed appropriately with a telfa and gauze bandage to ensure continued blood supply and protect the attached pedicle.
Complex Repair And Modified Advancement Flap Text: The defect edges were debeveled with a #15 scalpel blade.  The primary defect was closed partially with a complex linear closure.  Given the location of the remaining defect, shape of the defect and the proximity to free margins a modified advancement flap was deemed most appropriate for complete closure of the defect.  Using a sterile surgical marker, an appropriate advancement flap was drawn incorporating the defect and placing the expected incisions within the relaxed skin tension lines where possible.    The area thus outlined was incised deep to adipose tissue with a #15 scalpel blade.  The skin margins were undermined to an appropriate distance in all directions utilizing iris scissors.
Complex Repair And Dermal Autograft Text: The defect edges were debeveled with a #15 scalpel blade.  The primary defect was closed partially with a complex linear closure.  Given the location of the defect, shape of the defect and the proximity to free margins an dermal autograft was deemed most appropriate to repair the remaining defect.  The graft was trimmed to fit the size of the remaining defect.  The graft was then placed in the primary defect, oriented appropriately, and sutured into place.
Rhombic Flap Text: The defect edges were debeveled with a #15 scalpel blade.  Given the location of the defect and the proximity to free margins a rhombic flap was deemed most appropriate.  Using a sterile surgical marker, an appropriate rhombic flap was drawn incorporating the defect.    The area thus outlined was incised deep to adipose tissue with a #15 scalpel blade.  The skin margins were undermined to an appropriate distance in all directions utilizing iris scissors.
Intermediate / Complex Repair - Final Wound Length In Cm: 3.6
Split-Thickness Skin Graft Text: The defect edges were debeveled with a #15 scalpel blade.  Given the location of the defect, shape of the defect and the proximity to free margins a split thickness skin graft was deemed most appropriate.  Using a sterile surgical marker, the primary defect shape was transferred to the donor site. The split thickness graft was then harvested.  The skin graft was then placed in the primary defect and oriented appropriately.
V-Y Plasty Text: The defect edges were debeveled with a #15 scalpel blade.  Given the location of the defect, shape of the defect and the proximity to free margins an V-Y advancement flap was deemed most appropriate.  Using a sterile surgical marker, an appropriate advancement flap was drawn incorporating the defect and placing the expected incisions within the relaxed skin tension lines where possible.    The area thus outlined was incised deep to adipose tissue with a #15 scalpel blade.  The skin margins were undermined to an appropriate distance in all directions utilizing iris scissors.
Alar Island Pedicle Flap Text: The defect edges were debeveled with a #15 scalpel blade.  Given the location of the defect, shape of the defect and the proximity to the alar rim an island pedicle advancement flap was deemed most appropriate.  Using a sterile surgical marker, an appropriate advancement flap was drawn incorporating the defect, outlining the appropriate donor tissue and placing the expected incisions within the nasal ala running parallel to the alar rim. The area thus outlined was incised with a #15 scalpel blade.  The skin margins were undermined minimally to an appropriate distance in all directions around the primary defect and laterally outward around the island pedicle utilizing iris scissors.  There was minimal undermining beneath the pedicle flap.
Advancement Flap (Single) Text: The defect edges were debeveled with a #15 scalpel blade.  Given the location of the defect and the proximity to free margins a single advancement flap was deemed most appropriate.  Using a sterile surgical marker, an appropriate advancement flap was drawn incorporating the defect and placing the expected incisions within the relaxed skin tension lines where possible.    The area thus outlined was incised deep to adipose tissue with a #15 scalpel blade.  The skin margins were undermined to an appropriate distance in all directions utilizing iris scissors.
A-T Advancement Flap Text: The defect edges were debeveled with a #15 scalpel blade.  Given the location of the defect, shape of the defect and the proximity to free margins an A-T advancement flap was deemed most appropriate.  Using a sterile surgical marker, an appropriate advancement flap was drawn incorporating the defect and placing the expected incisions within the relaxed skin tension lines where possible.    The area thus outlined was incised deep to adipose tissue with a #15 scalpel blade.  The skin margins were undermined to an appropriate distance in all directions utilizing iris scissors.
Billing Type: Third-Party Bill
Purse String (Simple) Text: Given the location of the defect and the characteristics of the surrounding skin a purse string simple closure was deemed most appropriate.  Undermining was performed circumferentially around the surgical defect.  A purse string suture was then placed and tightened.
Advancement-Rotation Flap Text: The defect edges were debeveled with a #15 scalpel blade.  Given the location of the defect, shape of the defect and the proximity to free margins an advancement-rotation flap was deemed most appropriate.  Using a sterile surgical marker, an appropriate flap was drawn incorporating the defect and placing the expected incisions within the relaxed skin tension lines where possible. The area thus outlined was incised deep to adipose tissue with a #15 scalpel blade.  The skin margins were undermined to an appropriate distance in all directions utilizing iris scissors.
Perilesional Excision Additional Text (Leave Blank If You Do Not Want): The margin was drawn around the clinically apparent lesion. Incisions were then made along these lines to the appropriate tissue plane and the lesion was extirpated.
Trilobed Flap Text: The defect edges were debeveled with a #15 scalpel blade.  Given the location of the defect and the proximity to free margins a trilobed flap was deemed most appropriate.  Using a sterile surgical marker, an appropriate trilobed flap drawn around the defect.    The area thus outlined was incised deep to adipose tissue with a #15 scalpel blade.  The skin margins were undermined to an appropriate distance in all directions utilizing iris scissors.
Scalpel Size: 15 blade
Path Notes (To The Dermatopathologist): Please check margins
Xenograft Text: The defect edges were debeveled with a #15 scalpel blade.  Given the location of the defect, shape of the defect and the proximity to free margins a xenograft was deemed most appropriate.  The graft was then trimmed to fit the size of the defect.  The graft was then placed in the primary defect and oriented appropriately.
Ftsg Text: The defect edges were debeveled with a #15 scalpel blade.  Given the location of the defect, shape of the defect and the proximity to free margins a full thickness skin graft was deemed most appropriate.  Using a sterile surgical marker, the primary defect shape was transferred to the donor site. The area thus outlined was incised deep to adipose tissue with a #15 scalpel blade.  The harvested graft was then trimmed of adipose tissue until only dermis and epidermis was left.  The skin margins of the secondary defect were undermined to an appropriate distance in all directions utilizing iris scissors.  The secondary defect was closed with interrupted buried subcutaneous sutures.  The skin edges were then re-apposed with running  sutures.  The skin graft was then placed in the primary defect and oriented appropriately.
Complex Repair And Bilobe Flap Text: The defect edges were debeveled with a #15 scalpel blade.  The primary defect was closed partially with a complex linear closure.  Given the location of the remaining defect, shape of the defect and the proximity to free margins a bilobe flap was deemed most appropriate for complete closure of the defect.  Using a sterile surgical marker, an appropriate advancement flap was drawn incorporating the defect and placing the expected incisions within the relaxed skin tension lines where possible.    The area thus outlined was incised deep to adipose tissue with a #15 scalpel blade.  The skin margins were undermined to an appropriate distance in all directions utilizing iris scissors.
Complex Repair And Skin Substitute Graft Text: The defect edges were debeveled with a #15 scalpel blade.  The primary defect was closed partially with a complex linear closure.  Given the location of the remaining defect, shape of the defect and the proximity to free margins a skin substitute graft was deemed most appropriate to repair the remaining defect.  The graft was trimmed to fit the size of the remaining defect.  The graft was then placed in the primary defect, oriented appropriately, and sutured into place.
Dressing: pressure dressing
Estimated Blood Loss (Cc): minimal
S Plasty Text: Given the location and shape of the defect, and the orientation of relaxed skin tension lines, an S-plasty was deemed most appropriate for repair.  Using a sterile surgical marker, the appropriate outline of the S-plasty was drawn, incorporating the defect and placing the expected incisions within the relaxed skin tension lines where possible.  The area thus outlined was incised deep to adipose tissue with a #15 scalpel blade.  The skin margins were undermined to an appropriate distance in all directions utilizing iris scissors. The skin flaps were advanced over the defect.  The opposing margins were then approximated with interrupted buried subcutaneous sutures.
Complex Repair And Melolabial Flap Text: The defect edges were debeveled with a #15 scalpel blade.  The primary defect was closed partially with a complex linear closure.  Given the location of the remaining defect, shape of the defect and the proximity to free margins a melolabial flap was deemed most appropriate for complete closure of the defect.  Using a sterile surgical marker, an appropriate advancement flap was drawn incorporating the defect and placing the expected incisions within the relaxed skin tension lines where possible.    The area thus outlined was incised deep to adipose tissue with a #15 scalpel blade.  The skin margins were undermined to an appropriate distance in all directions utilizing iris scissors.
Cheek-To-Nose Interpolation Flap Text: A decision was made to reconstruct the defect utilizing an interpolation axial flap and a staged reconstruction.  A telfa template was made of the defect.  This telfa template was then used to outline the Cheek-To-Nose Interpolation flap.  The donor area for the pedicle flap was then injected with anesthesia.  The flap was excised through the skin and subcutaneous tissue down to the layer of the underlying musculature.  The interpolation flap was carefully excised within this deep plane to maintain its blood supply.  The edges of the donor site were undermined.   The donor site was closed in a primary fashion.  The pedicle was then rotated into position and sutured.  Once the tube was sutured into place, adequate blood supply was confirmed with blanching and refill.  The pedicle was then wrapped with xeroform gauze and dressed appropriately with a telfa and gauze bandage to ensure continued blood supply and protect the attached pedicle.
Ear Star Wedge Flap Text: The defect edges were debeveled with a #15 blade scalpel.  Given the location of the defect and the proximity to free margins (helical rim) an ear star wedge flap was deemed most appropriate.  Using a sterile surgical marker, the appropriate flap was drawn incorporating the defect and placing the expected incisions between the helical rim and antihelix where possible.  The area thus outlined was incised through and through with a #15 scalpel blade.
Repair Performed By Another Provider Text (Leave Blank If You Do Not Want): After the tissue was excised the defect was repaired by another provider.
Post-Care Instructions: I reviewed with the patient in detail post-care instructions. Patient is not to engage in any heavy lifting, exercise, or swimming for the next 7 days. Should the patient develop any fevers, chills, bleeding, severe pain patient will contact the office immediately.
Island Pedicle Flap With Canthal Suspension Text: The defect edges were debeveled with a #15 scalpel blade.  Given the location of the defect, shape of the defect and the proximity to free margins an island pedicle advancement flap was deemed most appropriate.  Using a sterile surgical marker, an appropriate advancement flap was drawn incorporating the defect, outlining the appropriate donor tissue and placing the expected incisions within the relaxed skin tension lines where possible. The area thus outlined was incised deep to adipose tissue with a #15 scalpel blade.  The skin margins were undermined to an appropriate distance in all directions around the primary defect and laterally outward around the island pedicle utilizing iris scissors.  There was minimal undermining beneath the pedicle flap. A suspension suture was placed in the canthal tendon to prevent tension and prevent ectropion.
Fusiform Excision Additional Text (Leave Blank If You Do Not Want): The margin was drawn around the clinically apparent lesion.  A fusiform shape was then drawn on the skin incorporating the lesion and margins.  Incisions were then made along these lines to the appropriate tissue plane and the lesion was extirpated.
Saucerization Excision Additional Text (Leave Blank If You Do Not Want): The margin was drawn around the clinically apparent lesion.  Incisions were then made along these lines, in a tangential fashion, to the appropriate tissue plane and the lesion was extirpated.
Complex Repair And Dorsal Nasal Flap Text: The defect edges were debeveled with a #15 scalpel blade.  The primary defect was closed partially with a complex linear closure.  Given the location of the remaining defect, shape of the defect and the proximity to free margins a dorsal nasal flap was deemed most appropriate for complete closure of the defect.  Using a sterile surgical marker, an appropriate flap was drawn incorporating the defect and placing the expected incisions within the relaxed skin tension lines where possible.    The area thus outlined was incised deep to adipose tissue with a #15 scalpel blade.  The skin margins were undermined to an appropriate distance in all directions utilizing iris scissors.
Interpolation Flap Text: A decision was made to reconstruct the defect utilizing an interpolation axial flap and a staged reconstruction.  A telfa template was made of the defect.  This telfa template was then used to outline the interpolation flap.  The donor area for the pedicle flap was then injected with anesthesia.  The flap was excised through the skin and subcutaneous tissue down to the layer of the underlying musculature.  The interpolation flap was carefully excised within this deep plane to maintain its blood supply.  The edges of the donor site were undermined.   The donor site was closed in a primary fashion.  The pedicle was then rotated into position and sutured.  Once the tube was sutured into place, adequate blood supply was confirmed with blanching and refill.  The pedicle was then wrapped with xeroform gauze and dressed appropriately with a telfa and gauze bandage to ensure continued blood supply and protect the attached pedicle.

## 2018-03-16 NOTE — PROCEDURE: OTHER
Other (Free Text): No clinical residual at today’s visit.
Note Text (......Xxx Chief Complaint.): This diagnosis correlates with the
Detail Level: Simple

## 2018-03-22 ENCOUNTER — HOSPITAL ENCOUNTER (OUTPATIENT)
Dept: LAB | Age: 73
Discharge: HOME | End: 2018-03-22
Attending: INTERNAL MEDICINE
Payer: MEDICARE

## 2018-03-22 DIAGNOSIS — K92.2 GASTROINTESTINAL HEMORRHAGE, UNSPECIFIED GASTROINTESTINAL HEMORRHAGE TYPE: ICD-10-CM

## 2018-03-22 LAB
BASOPHILS # BLD: 0 K/UL (ref 0–0.2)
BASOPHILS NFR BLD: 0 % (ref 0–2)
DIFFERENTIAL METHOD BLD: ABNORMAL
EOSINOPHIL # BLD: 0.1 K/UL (ref 0–0.8)
EOSINOPHIL NFR BLD: 2 % (ref 0.5–7.8)
ERYTHROCYTE [DISTWIDTH] IN BLOOD BY AUTOMATED COUNT: 13.2 % (ref 11.9–14.6)
HCT VFR BLD AUTO: 30.4 % (ref 35.8–46.3)
HGB BLD-MCNC: 9.5 G/DL (ref 11.7–15.4)
LYMPHOCYTES # BLD: 1.4 K/UL (ref 0.5–4.6)
LYMPHOCYTES NFR BLD: 26 % (ref 13–44)
MCH RBC QN AUTO: 29 PG (ref 26.1–32.9)
MCHC RBC AUTO-ENTMCNC: 31.3 G/DL (ref 31.4–35)
MCV RBC AUTO: 92.7 FL (ref 79.6–97.8)
MONOCYTES # BLD: 0.6 K/UL (ref 0.1–1.3)
MONOCYTES NFR BLD: 11 % (ref 4–12)
NEUTS SEG # BLD: 3.2 K/UL (ref 1.7–8.2)
NEUTS SEG NFR BLD: 61 % (ref 43–78)
PLATELET # BLD AUTO: 284 K/UL (ref 150–450)
PMV BLD AUTO: 9.5 FL (ref 10.8–14.1)
RBC # BLD AUTO: 3.28 M/UL (ref 4.05–5.25)
WBC # BLD AUTO: 5.3 K/UL (ref 4.3–11.1)

## 2018-03-22 PROCEDURE — 85025 COMPLETE CBC W/AUTO DIFF WBC: CPT | Performed by: INTERNAL MEDICINE

## 2018-03-22 PROCEDURE — 36415 COLL VENOUS BLD VENIPUNCTURE: CPT | Performed by: INTERNAL MEDICINE

## 2018-03-30 ENCOUNTER — APPOINTMENT (RX ONLY)
Dept: URBAN - METROPOLITAN AREA CLINIC 23 | Facility: CLINIC | Age: 73
Setting detail: DERMATOLOGY
End: 2018-03-30

## 2018-03-30 DIAGNOSIS — Z48.01 ENCOUNTER FOR CHANGE OR REMOVAL OF SURGICAL WOUND DRESSING: ICD-10-CM

## 2018-03-30 PROCEDURE — ? SUTURE REMOVAL (GLOBAL PERIOD)

## 2018-03-30 ASSESSMENT — LOCATION SIMPLE DESCRIPTION DERM: LOCATION SIMPLE: RIGHT PRETIBIAL REGION

## 2018-03-30 ASSESSMENT — LOCATION ZONE DERM: LOCATION ZONE: LEG

## 2018-03-30 ASSESSMENT — LOCATION DETAILED DESCRIPTION DERM: LOCATION DETAILED: RIGHT DISTAL PRETIBIAL REGION

## 2018-03-30 NOTE — PROCEDURE: SUTURE REMOVAL (GLOBAL PERIOD)
Add 49023 Cpt? (Important Note: In 2017 The Use Of 42317 Is Being Tracked By Cms To Determine Future Global Period Reimbursement For Global Periods): no
Detail Level: Simple

## 2018-05-15 ENCOUNTER — HOSPITAL ENCOUNTER (OUTPATIENT)
Dept: MAMMOGRAPHY | Age: 73
Discharge: HOME | End: 2018-05-15
Attending: INTERNAL MEDICINE
Payer: MEDICARE

## 2018-05-15 DIAGNOSIS — M89.9 DISORDER OF BONE AND CARTILAGE: ICD-10-CM

## 2018-05-15 DIAGNOSIS — M94.9 DISORDER OF BONE AND CARTILAGE: ICD-10-CM

## 2018-05-15 PROCEDURE — 77080 DXA BONE DENSITY AXIAL: CPT

## 2018-05-15 NOTE — PROGRESS NOTES
dexa has values that predict spine fracture risk 26% and hip at 8.5% over next 10 yr--both these exceed threshold to treat-not sure would use fosamax type drugs and need to discuss how best to treat this in office(next month or so for appt

## 2018-05-16 NOTE — PROGRESS NOTES
Patient notified of dexa results and appt made with Dr Richard Delacruz to discuss on 5/31 at 145pm/TD

## 2018-06-28 ENCOUNTER — HOSPITAL ENCOUNTER (OUTPATIENT)
Dept: LAB | Age: 73
Discharge: HOME | End: 2018-06-28
Attending: INTERNAL MEDICINE
Payer: MEDICARE

## 2018-06-28 DIAGNOSIS — K92.2 GASTROINTESTINAL HEMORRHAGE, UNSPECIFIED GASTROINTESTINAL HEMORRHAGE TYPE: ICD-10-CM

## 2018-06-28 LAB
BASOPHILS # BLD: 0 K/UL (ref 0–0.2)
BASOPHILS NFR BLD: 1 % (ref 0–2)
DIFFERENTIAL METHOD BLD: ABNORMAL
EOSINOPHIL # BLD: 0.1 K/UL (ref 0–0.8)
EOSINOPHIL NFR BLD: 2 % (ref 0.5–7.8)
ERYTHROCYTE [DISTWIDTH] IN BLOOD BY AUTOMATED COUNT: 14.5 % (ref 11.9–14.6)
HCT VFR BLD AUTO: 39.8 % (ref 35.8–46.3)
HGB BLD-MCNC: 13 G/DL (ref 11.7–15.4)
LYMPHOCYTES # BLD: 1.4 K/UL (ref 0.5–4.6)
LYMPHOCYTES NFR BLD: 26 % (ref 13–44)
MCH RBC QN AUTO: 29.5 PG (ref 26.1–32.9)
MCHC RBC AUTO-ENTMCNC: 32.7 G/DL (ref 31.4–35)
MCV RBC AUTO: 90.5 FL (ref 79.6–97.8)
MONOCYTES # BLD: 0.6 K/UL (ref 0.1–1.3)
MONOCYTES NFR BLD: 11 % (ref 4–12)
NEUTS SEG # BLD: 3.2 K/UL (ref 1.7–8.2)
NEUTS SEG NFR BLD: 60 % (ref 43–78)
PLATELET # BLD AUTO: 228 K/UL (ref 150–450)
PMV BLD AUTO: 9.6 FL (ref 10.8–14.1)
RBC # BLD AUTO: 4.4 M/UL (ref 4.05–5.25)
WBC # BLD AUTO: 5.4 K/UL (ref 4.3–11.1)

## 2018-06-28 PROCEDURE — 85025 COMPLETE CBC W/AUTO DIFF WBC: CPT | Performed by: INTERNAL MEDICINE

## 2018-06-28 PROCEDURE — 36415 COLL VENOUS BLD VENIPUNCTURE: CPT | Performed by: INTERNAL MEDICINE

## 2018-10-20 ENCOUNTER — HOSPITAL ENCOUNTER (OUTPATIENT)
Dept: MAMMOGRAPHY | Age: 73
Discharge: HOME | End: 2018-10-20
Attending: OBSTETRICS & GYNECOLOGY
Payer: MEDICARE

## 2018-10-20 DIAGNOSIS — Z12.31 VISIT FOR SCREENING MAMMOGRAM: ICD-10-CM

## 2018-10-20 PROCEDURE — 77063 BREAST TOMOSYNTHESIS BI: CPT

## 2018-12-03 PROBLEM — M85.80 OSTEOPENIA: Status: ACTIVE | Noted: 2018-12-03

## 2019-04-20 ENCOUNTER — HOSPITAL ENCOUNTER (EMERGENCY)
Age: 74
Discharge: HOME | End: 2019-04-20
Attending: EMERGENCY MEDICINE
Payer: MEDICARE

## 2019-04-20 ENCOUNTER — APPOINTMENT (OUTPATIENT)
Dept: GENERAL RADIOLOGY | Age: 74
End: 2019-04-20
Attending: EMERGENCY MEDICINE
Payer: MEDICARE

## 2019-04-20 VITALS
RESPIRATION RATE: 16 BRPM | HEIGHT: 66 IN | WEIGHT: 160 LBS | OXYGEN SATURATION: 97 % | SYSTOLIC BLOOD PRESSURE: 134 MMHG | BODY MASS INDEX: 25.71 KG/M2 | TEMPERATURE: 98.4 F | DIASTOLIC BLOOD PRESSURE: 67 MMHG | HEART RATE: 73 BPM

## 2019-04-20 DIAGNOSIS — Z86.79 HISTORY OF ATRIAL FIBRILLATION: ICD-10-CM

## 2019-04-20 DIAGNOSIS — R00.2 PALPITATIONS: Primary | ICD-10-CM

## 2019-04-20 LAB
ALBUMIN SERPL-MCNC: 3.6 G/DL (ref 3.2–4.6)
ALBUMIN/GLOB SERPL: 1 {RATIO} (ref 1.2–3.5)
ALP SERPL-CCNC: 64 U/L (ref 50–136)
ALT SERPL-CCNC: 19 U/L (ref 12–65)
ANION GAP SERPL CALC-SCNC: 9 MMOL/L (ref 7–16)
AST SERPL-CCNC: 18 U/L (ref 15–37)
BASOPHILS # BLD: 0 K/UL (ref 0–0.2)
BASOPHILS NFR BLD: 1 % (ref 0–2)
BILIRUB SERPL-MCNC: 0.3 MG/DL (ref 0.2–1.1)
BUN SERPL-MCNC: 19 MG/DL (ref 8–23)
CALCIUM SERPL-MCNC: 9.5 MG/DL (ref 8.3–10.4)
CHLORIDE SERPL-SCNC: 104 MMOL/L (ref 98–107)
CO2 SERPL-SCNC: 27 MMOL/L (ref 21–32)
CREAT SERPL-MCNC: 0.85 MG/DL (ref 0.6–1)
DIFFERENTIAL METHOD BLD: ABNORMAL
EOSINOPHIL # BLD: 0.1 K/UL (ref 0–0.8)
EOSINOPHIL NFR BLD: 2 % (ref 0.5–7.8)
ERYTHROCYTE [DISTWIDTH] IN BLOOD BY AUTOMATED COUNT: 12.3 % (ref 11.9–14.6)
GLOBULIN SER CALC-MCNC: 3.5 G/DL (ref 2.3–3.5)
GLUCOSE SERPL-MCNC: 111 MG/DL (ref 65–100)
HCT VFR BLD AUTO: 40.4 % (ref 35.8–46.3)
HGB BLD-MCNC: 13.3 G/DL (ref 11.7–15.4)
IMM GRANULOCYTES # BLD AUTO: 0 K/UL (ref 0–0.5)
IMM GRANULOCYTES NFR BLD AUTO: 0 % (ref 0–5)
LACTATE BLD-SCNC: 0.6 MMOL/L (ref 0.5–1.9)
LYMPHOCYTES # BLD: 1.5 K/UL (ref 0.5–4.6)
LYMPHOCYTES NFR BLD: 26 % (ref 13–44)
MCH RBC QN AUTO: 32.3 PG (ref 26.1–32.9)
MCHC RBC AUTO-ENTMCNC: 32.9 G/DL (ref 31.4–35)
MCV RBC AUTO: 98.1 FL (ref 79.6–97.8)
MONOCYTES # BLD: 0.5 K/UL (ref 0.1–1.3)
MONOCYTES NFR BLD: 8 % (ref 4–12)
NEUTS SEG # BLD: 3.8 K/UL (ref 1.7–8.2)
NEUTS SEG NFR BLD: 63 % (ref 43–78)
NRBC # BLD: 0 K/UL (ref 0–0.2)
PLATELET # BLD AUTO: 246 K/UL (ref 150–450)
PMV BLD AUTO: 9.9 FL (ref 9.4–12.3)
POTASSIUM SERPL-SCNC: 4 MMOL/L (ref 3.5–5.1)
PROT SERPL-MCNC: 7.1 G/DL (ref 6.3–8.2)
RBC # BLD AUTO: 4.12 M/UL (ref 4.05–5.2)
SODIUM SERPL-SCNC: 140 MMOL/L (ref 136–145)
TROPONIN I BLD-MCNC: 0 NG/ML (ref 0.02–0.05)
TROPONIN I SERPL-MCNC: <0.02 NG/ML (ref 0.02–0.05)
WBC # BLD AUTO: 6 K/UL (ref 4.3–11.1)

## 2019-04-20 PROCEDURE — 84484 ASSAY OF TROPONIN QUANT: CPT

## 2019-04-20 PROCEDURE — 83605 ASSAY OF LACTIC ACID: CPT

## 2019-04-20 PROCEDURE — 85025 COMPLETE CBC W/AUTO DIFF WBC: CPT

## 2019-04-20 PROCEDURE — 80053 COMPREHEN METABOLIC PANEL: CPT

## 2019-04-20 PROCEDURE — 99285 EMERGENCY DEPT VISIT HI MDM: CPT | Performed by: EMERGENCY MEDICINE

## 2019-04-20 PROCEDURE — 71045 X-RAY EXAM CHEST 1 VIEW: CPT

## 2019-04-20 PROCEDURE — 93005 ELECTROCARDIOGRAM TRACING: CPT | Performed by: EMERGENCY MEDICINE

## 2019-04-20 NOTE — ED NOTES
I have reviewed discharge instructions with the patient and spouse. The patient and spouse verbalized understanding. Patient left ED via Discharge Method: ambulatory to Home with transport from spouse. The patient is ambulatory upon exit and appears in no acute distress. The patient has been provided discharge instructions and follow up information. The patient and spouse do not have any questions at this time. Opportunity for questions and clarification provided. Patient given 0 scripts. To continue your aftercare when you leave the hospital, you may receive an automated call from our care team to check in on how you are doing. This is a free service and part of our promise to provide the best care and service to meet your aftercare needs.  If you have questions, or wish to unsubscribe from this service please call [phone]. Thank you for Choosing our [facility].

## 2019-04-20 NOTE — ED NOTES
Patient ambulatory to and from restroom without difficulty. Patient able to provide urine specimen. Patient resting in stretcher with cardiac monitoring and cycling vitals in place.  at bedside. Will continue to monitor.

## 2019-04-20 NOTE — ED PROVIDER NOTES
Patient states she has a history of atrial fibrillation in the past for which she had an ablation done. After that her atrial fibrillation resolved. The past couple weeks, she has had intermittent episodes of palpitations similar to what she had with her atrial fibrillation. She states today it was much worse. She has a spot watch which showed her heart rate getting up in the 110s for long period of time. She also was getting dizzy and had some chest discomfort. She came here for evaluation. Once she arrived here, her palpitations resolved. Elements of this note were created using speech recognition software. As such, errors of speech recognition may be present. Past Medical History:  
Diagnosis Date  A-fib ([address] 2012  Abnormal Pap smear of cervix  Arthritis  Dyslipidemia 2013  Heart failure (HCC)   
 a fib  History of pulmonary embolism   
 after hysterectomy  Hypercholesterolemia  Insomnia 2015  Menopause  HALIMA (obstructive sleep apnea) 6/15/2012  PAF (paroxysmal atrial fibrillation)--tikosyn loading 6/15/2012  Persistent atrial fibrillation ([address] 2014  Typical atrial flutter ([address] Past Surgical History:  
Procedure Laterality Date  HX AFIB ABLATION  2014 Heart Ablation  HX  SECTION    
 x2  
 HX CHOLECYSTECTOMY  HX CYST REMOVAL    
 HX HYSTERECTOMY  HX ORTHOPAEDIC    
 lt ganglion cyst removal  
 HX OTHER SURGICAL    
 laser surgery for varicose veins  HX TONSILLECTOMY Family History:  
Problem Relation Age of Onset  Breast Cancer Paternal Aunt 61  
     64 PLUS YRS  
 Heart Disease Paternal Aunt  Cancer Father Colon  Heart Disease Father  Stroke Sister  Heart Disease Sister  Alzheimer Mother  Heart Disease Paternal Uncle Social History Socioeconomic History  Marital status:  Spouse name: Not on file  Number of children: Not on file  Years of education: Not on file  Highest education level: Not on file Occupational History  Not on file Social Needs  Financial resource strain: Not on file  Food insecurity:  
  Worry: Not on file Inability: Not on file  Transportation needs:  
  Medical: Not on file Non-medical: Not on file Tobacco Use  Smoking status: Never Smoker  Smokeless tobacco: Never Used Substance and Sexual Activity  Alcohol use: No  
 Drug use: No  
 Sexual activity: Not on file Lifestyle  Physical activity:  
  Days per week: Not on file Minutes per session: Not on file  Stress: Not on file Relationships  Social connections:  
  Talks on phone: Not on file Gets together: Not on file Attends Anabaptism service: Not on file Active member of club or organization: Not on file Attends meetings of clubs or organizations: Not on file Relationship status: Not on file  Intimate partner violence:  
  Fear of current or ex partner: Not on file Emotionally abused: Not on file Physically abused: Not on file Forced sexual activity: Not on file Other Topics Concern [address] Service Not Asked  Blood Transfusions Not Asked  Caffeine Concern No  
 Occupational Exposure Not Asked Judene Pill Hazards Not Asked  Sleep Concern Not Asked  Stress Concern Not Asked  Weight Concern Not Asked  Special Diet Not Asked  Back Care Not Asked  Exercise Yes  Bike Helmet Not Asked [address] Yes  Self-Exams No  
Social History Narrative No history of physical or sexual abuse feels safe at home ALLERGIES: Codeine and Livalo [pitavastatin] Review of Systems Constitutional: Negative for chills and fever. Cardiovascular: Positive for palpitations. Gastrointestinal: Negative for nausea and vomiting. All other systems reviewed and are negative. Vitals: 04/20/19 1439 04/20/19 1647 04/20/19 1716 BP: 116/67 120/62 120/62 Pulse: 89 91 87 Resp: 16 Temp: 98.3 °F (36.8 °C) SpO2: 94% 96% 96% Weight: 72.6 kg (160 lb) Height: 5' 6\" (1.676 m) Physical Exam  
Constitutional: She is oriented to person, place, and time. She appears well-developed and well-nourished. HENT:  
Head: Normocephalic and atraumatic. Eyes: Pupils are equal, round, and reactive to light. Conjunctivae are normal.  
Neck: Normal range of motion. Neck supple. Cardiovascular: Normal rate, regular rhythm and normal heart sounds. Pulmonary/Chest: Effort normal and breath sounds normal.  
Abdominal: Soft. Bowel sounds are normal.  
Musculoskeletal: She exhibits no edema or tenderness. Neurological: She is alert and oriented to person, place, and time. Skin: Skin is warm and dry. Psychiatric: She has a normal mood and affect. Her behavior is normal.  
Nursing note and vitals reviewed. MDM Number of Diagnoses or Management Options History of atrial fibrillation:  
Palpitations: new and requires workup Diagnosis management comments: 5:30 PM chest x-ray shows a nodule. Patient states that she has had that for many years, saw pulmonologist and it was decided to do nothing because it was not growing. She has an appointment with her cardiologist on Tuesday. 7:46 PM discussed results with patient, need for close follow-up with Dr. Emma Wyman Amount and/or Complexity of Data Reviewed Clinical lab tests: ordered and reviewed Tests in the radiology section of CPT®: ordered and reviewed Tests in the medicine section of CPT®: ordered and reviewed Risk of Complications, Morbidity, and/or Mortality Presenting problems: moderate Diagnostic procedures: moderate Management options: moderate Patient Progress Patient progress: improved Procedures

## 2019-04-20 NOTE — ED TRIAGE NOTES
Patient states she believes her heart is out of rhythm. States it has felt it was in and out of rhythm for 3 or 4 days. States hx of a fib and had ablation around 2014. Denies chest pain. States her right shoulder has been hurting. Reports some SOB.  Denies n/v.

## 2019-04-20 NOTE — DISCHARGE INSTRUCTIONS
Follow-up with Dr. Emma Wyman on Tuesday as scheduled. Return to the emergency department if your symptoms recur.

## 2019-04-21 LAB
ATRIAL RATE: 91 BPM
CALCULATED P AXIS, ECG09: 80 DEGREES
CALCULATED R AXIS, ECG10: 45 DEGREES
CALCULATED T AXIS, ECG11: 67 DEGREES
DIAGNOSIS, 93000: NORMAL
P-R INTERVAL, ECG05: 156 MS
Q-T INTERVAL, ECG07: 346 MS
QRS DURATION, ECG06: 78 MS
QTC CALCULATION (BEZET), ECG08: 425 MS
VENTRICULAR RATE, ECG03: 91 BPM

## 2019-06-04 PROBLEM — K92.2 GI BLEED: Status: RESOLVED | Noted: 2018-03-22 | Resolved: 2019-06-04

## 2019-12-05 ENCOUNTER — HOSPITAL ENCOUNTER (OUTPATIENT)
Dept: MAMMOGRAPHY | Age: 74
Discharge: HOME | End: 2019-12-05
Attending: INTERNAL MEDICINE
Payer: MEDICARE

## 2019-12-05 DIAGNOSIS — Z12.31 VISIT FOR SCREENING MAMMOGRAM: ICD-10-CM

## 2019-12-05 PROCEDURE — 77063 BREAST TOMOSYNTHESIS BI: CPT

## 2019-12-26 PROBLEM — F32.A MILD DEPRESSION: Status: ACTIVE | Noted: 2019-12-26

## 2019-12-30 ENCOUNTER — HOSPITAL ENCOUNTER (OUTPATIENT)
Dept: LAB | Age: 74
Discharge: HOME | End: 2019-12-30
Attending: INTERNAL MEDICINE
Payer: MEDICARE

## 2019-12-30 DIAGNOSIS — I10 HYPERTENSION, UNSPECIFIED TYPE: ICD-10-CM

## 2019-12-30 DIAGNOSIS — E78.5 DYSLIPIDEMIA: ICD-10-CM

## 2019-12-30 LAB
ANION GAP SERPL CALC-SCNC: 5 MMOL/L (ref 7–16)
BASOPHILS # BLD: 0 K/UL (ref 0–0.2)
BASOPHILS NFR BLD: 0 % (ref 0–2)
BUN SERPL-MCNC: 16 MG/DL (ref 8–23)
CALCIUM SERPL-MCNC: 9.6 MG/DL (ref 8.3–10.4)
CHLORIDE SERPL-SCNC: 105 MMOL/L (ref 98–107)
CHOLEST SERPL-MCNC: 206 MG/DL
CO2 SERPL-SCNC: 29 MMOL/L (ref 21–32)
CREAT SERPL-MCNC: 0.8 MG/DL (ref 0.6–1)
DIFFERENTIAL METHOD BLD: ABNORMAL
EOSINOPHIL # BLD: 0 K/UL (ref 0–0.8)
EOSINOPHIL NFR BLD: 1 % (ref 0.5–7.8)
ERYTHROCYTE [DISTWIDTH] IN BLOOD BY AUTOMATED COUNT: 12.3 % (ref 11.9–14.6)
GLUCOSE SERPL-MCNC: 94 MG/DL (ref 65–100)
HCT VFR BLD AUTO: 39.7 % (ref 35.8–46.3)
HDLC SERPL-MCNC: 51 MG/DL (ref 40–60)
HDLC SERPL: 4 {RATIO}
HGB BLD-MCNC: 13.4 G/DL (ref 11.7–15.4)
IMM GRANULOCYTES # BLD AUTO: 0 K/UL (ref 0–0.5)
IMM GRANULOCYTES NFR BLD AUTO: 0 % (ref 0–5)
LDLC SERPL CALC-MCNC: 136 MG/DL
LIPID PROFILE,FLP: ABNORMAL
LYMPHOCYTES # BLD: 1 K/UL (ref 0.5–4.6)
LYMPHOCYTES NFR BLD: 23 % (ref 13–44)
MCH RBC QN AUTO: 33.4 PG (ref 26.1–32.9)
MCHC RBC AUTO-ENTMCNC: 33.8 G/DL (ref 31.4–35)
MCV RBC AUTO: 99 FL (ref 79.6–97.8)
MONOCYTES # BLD: 0.4 K/UL (ref 0.1–1.3)
MONOCYTES NFR BLD: 9 % (ref 4–12)
NEUTS SEG # BLD: 3 K/UL (ref 1.7–8.2)
NEUTS SEG NFR BLD: 67 % (ref 43–78)
NRBC # BLD: 0 K/UL (ref 0–0.2)
PLATELET # BLD AUTO: 187 K/UL (ref 150–450)
PMV BLD AUTO: 9 FL (ref 9.4–12.3)
POTASSIUM SERPL-SCNC: 4.1 MMOL/L (ref 3.5–5.1)
RBC # BLD AUTO: 4.01 M/UL (ref 4.05–5.2)
SODIUM SERPL-SCNC: 139 MMOL/L (ref 136–145)
TRIGL SERPL-MCNC: 95 MG/DL (ref 35–150)
VLDLC SERPL CALC-MCNC: 19 MG/DL (ref 6–23)
WBC # BLD AUTO: 4.5 K/UL (ref 4.3–11.1)

## 2019-12-30 PROCEDURE — 85025 COMPLETE CBC W/AUTO DIFF WBC: CPT

## 2019-12-30 PROCEDURE — 80061 LIPID PANEL: CPT

## 2019-12-30 PROCEDURE — 80048 BASIC METABOLIC PNL TOTAL CA: CPT

## 2019-12-30 PROCEDURE — 36415 COLL VENOUS BLD VENIPUNCTURE: CPT

## 2020-07-02 ENCOUNTER — HOSPITAL ENCOUNTER (OUTPATIENT)
Dept: MRI IMAGING | Age: 75
Discharge: HOME | End: 2020-07-02
Attending: INTERNAL MEDICINE
Payer: MEDICARE

## 2020-07-02 DIAGNOSIS — H53.9 VISUAL DISTURBANCES: ICD-10-CM

## 2020-07-02 PROCEDURE — 70551 MRI BRAIN STEM W/O DYE: CPT

## 2020-07-04 NOTE — PROGRESS NOTES
MRI has no stroke or tumor- some changes in the small vessels that can be seen in migraine patients or are age related

## 2020-09-24 ENCOUNTER — HOSPITAL ENCOUNTER (OUTPATIENT)
Dept: MAMMOGRAPHY | Age: 75
Discharge: HOME | End: 2020-09-24
Attending: INTERNAL MEDICINE
Payer: MEDICARE

## 2020-09-24 DIAGNOSIS — Z78.0 POSTMENOPAUSAL STATE: ICD-10-CM

## 2020-09-24 PROCEDURE — 77080 DXA BONE DENSITY AXIAL: CPT

## 2020-09-25 NOTE — PROGRESS NOTES
dexa has osteopenia still --the density increased about 0.7% in hip so this means stable-this good-would stay on the fosamax(started 6/2018)

## 2021-01-06 ENCOUNTER — TRANSCRIBE ORDER (OUTPATIENT)
Dept: SCHEDULING | Age: 76
End: 2021-01-06

## 2021-01-06 DIAGNOSIS — Z12.31 SCREENING MAMMOGRAM FOR HIGH-RISK PATIENT: Primary | ICD-10-CM

## 2021-02-12 ENCOUNTER — HOSPITAL ENCOUNTER (OUTPATIENT)
Dept: MAMMOGRAPHY | Age: 76
Discharge: HOME | End: 2021-02-12
Attending: INTERNAL MEDICINE
Payer: MEDICARE

## 2021-02-12 DIAGNOSIS — Z12.31 SCREENING MAMMOGRAM FOR HIGH-RISK PATIENT: ICD-10-CM

## 2021-02-12 PROCEDURE — 77063 BREAST TOMOSYNTHESIS BI: CPT

## 2021-02-16 PROBLEM — G44.229 CHRONIC TENSION-TYPE HEADACHE, NOT INTRACTABLE: Status: ACTIVE | Noted: 2021-02-16

## 2021-02-17 ENCOUNTER — HOSPITAL ENCOUNTER (OUTPATIENT)
Dept: LAB | Age: 76
Discharge: HOME | End: 2021-02-17
Payer: MEDICARE

## 2021-02-17 DIAGNOSIS — E78.5 DYSLIPIDEMIA: ICD-10-CM

## 2021-02-17 LAB
ALBUMIN SERPL-MCNC: 3.7 G/DL (ref 3.2–4.6)
ALBUMIN/GLOB SERPL: 1.1 {RATIO} (ref 1.2–3.5)
ALP SERPL-CCNC: 124 U/L (ref 50–136)
ALT SERPL-CCNC: 104 U/L (ref 12–65)
ANION GAP SERPL CALC-SCNC: 2 MMOL/L (ref 7–16)
AST SERPL-CCNC: 48 U/L (ref 15–37)
BASOPHILS # BLD: 0 K/UL (ref 0–0.2)
BASOPHILS NFR BLD: 0 % (ref 0–2)
BILIRUB SERPL-MCNC: 0.5 MG/DL (ref 0.2–1.1)
BUN SERPL-MCNC: 10 MG/DL (ref 8–23)
CALCIUM SERPL-MCNC: 8.9 MG/DL (ref 8.3–10.4)
CHLORIDE SERPL-SCNC: 105 MMOL/L (ref 98–107)
CHOLEST SERPL-MCNC: 187 MG/DL
CO2 SERPL-SCNC: 31 MMOL/L (ref 21–32)
CREAT SERPL-MCNC: 0.67 MG/DL (ref 0.6–1)
DIFFERENTIAL METHOD BLD: ABNORMAL
EOSINOPHIL # BLD: 0.1 K/UL (ref 0–0.8)
EOSINOPHIL NFR BLD: 2 % (ref 0.5–7.8)
ERYTHROCYTE [DISTWIDTH] IN BLOOD BY AUTOMATED COUNT: 12.7 % (ref 11.9–14.6)
GLOBULIN SER CALC-MCNC: 3.5 G/DL (ref 2.3–3.5)
GLUCOSE SERPL-MCNC: 84 MG/DL (ref 65–100)
HCT VFR BLD AUTO: 40 % (ref 35.8–46.3)
HDLC SERPL-MCNC: 46 MG/DL (ref 40–60)
HDLC SERPL: 4.1 {RATIO}
HGB BLD-MCNC: 12.7 G/DL (ref 11.7–15.4)
IMM GRANULOCYTES # BLD AUTO: 0 K/UL (ref 0–0.5)
IMM GRANULOCYTES NFR BLD AUTO: 0 % (ref 0–5)
LDLC SERPL CALC-MCNC: 121.8 MG/DL
LIPID PROFILE,FLP: ABNORMAL
LYMPHOCYTES # BLD: 1.1 K/UL (ref 0.5–4.6)
LYMPHOCYTES NFR BLD: 26 % (ref 13–44)
MCH RBC QN AUTO: 32.7 PG (ref 26.1–32.9)
MCHC RBC AUTO-ENTMCNC: 31.8 G/DL (ref 31.4–35)
MCV RBC AUTO: 103.1 FL (ref 79.6–97.8)
MONOCYTES # BLD: 0.5 K/UL (ref 0.1–1.3)
MONOCYTES NFR BLD: 10 % (ref 4–12)
NEUTS SEG # BLD: 2.7 K/UL (ref 1.7–8.2)
NEUTS SEG NFR BLD: 62 % (ref 43–78)
NRBC # BLD: 0 K/UL (ref 0–0.2)
PLATELET # BLD AUTO: 207 K/UL (ref 150–450)
PMV BLD AUTO: 9.8 FL (ref 9.4–12.3)
POTASSIUM SERPL-SCNC: 4 MMOL/L (ref 3.5–5.1)
PROT SERPL-MCNC: 7.2 G/DL (ref 6.3–8.2)
RBC # BLD AUTO: 3.88 M/UL (ref 4.05–5.2)
SODIUM SERPL-SCNC: 138 MMOL/L (ref 136–145)
TRIGL SERPL-MCNC: 96 MG/DL (ref 35–150)
TSH SERPL DL<=0.005 MIU/L-ACNC: 0.95 UIU/ML (ref 0.36–3.74)
VLDLC SERPL CALC-MCNC: 19.2 MG/DL (ref 6–23)
WBC # BLD AUTO: 4.3 K/UL (ref 4.3–11.1)

## 2021-02-17 PROCEDURE — 36415 COLL VENOUS BLD VENIPUNCTURE: CPT

## 2021-02-17 PROCEDURE — 80061 LIPID PANEL: CPT

## 2021-02-17 PROCEDURE — 85025 COMPLETE CBC W/AUTO DIFF WBC: CPT

## 2021-02-17 PROCEDURE — 80053 COMPREHEN METABOLIC PANEL: CPT

## 2021-02-17 PROCEDURE — 84443 ASSAY THYROID STIM HORMONE: CPT

## 2021-02-17 NOTE — PROGRESS NOTES
Labs stable but liver enzymes mildly increased from before--fatty liver in most common but need to assess and follow this--first is to recheck the enzymes in 6 weeks and get Hep C antibody, hep B surface antigen , ferritin

## 2021-03-23 PROBLEM — D89.2 HYPERGAMMAGLOBULINEMIA: Status: ACTIVE | Noted: 2021-03-12

## 2021-03-23 PROBLEM — J32.9 CHRONIC SINUSITIS: Status: ACTIVE | Noted: 2021-03-12

## 2021-03-23 PROBLEM — D80.9 IMMUNODEFICIENCY WITH PREDOMINANTLY ANTIBODY DEFECTS, UNSPECIFIED (HCC): Status: ACTIVE | Noted: 2021-03-12

## 2021-03-23 PROBLEM — J31.0 NON-ALLERGIC RHINITIS: Status: ACTIVE | Noted: 2021-03-12

## 2021-03-23 PROBLEM — E55.9 VITAMIN D DEFICIENCY: Status: ACTIVE | Noted: 2021-03-12

## 2021-09-03 PROBLEM — D80.9 IMMUNODEFICIENCY WITH PREDOMINANTLY ANTIBODY DEFECTS, UNSPECIFIED (HCC): Status: RESOLVED | Noted: 2021-03-12 | Resolved: 2021-09-03

## 2022-01-24 ENCOUNTER — TRANSCRIBE ORDER (OUTPATIENT)
Dept: SCHEDULING | Age: 77
End: 2022-01-24

## 2022-01-24 DIAGNOSIS — Z12.31 VISIT FOR SCREENING MAMMOGRAM: Primary | ICD-10-CM

## 2022-02-21 ENCOUNTER — HOSPITAL ENCOUNTER (OUTPATIENT)
Dept: MAMMOGRAPHY | Age: 77
Discharge: HOME | End: 2022-02-21
Attending: INTERNAL MEDICINE
Payer: MEDICARE

## 2022-02-21 DIAGNOSIS — Z12.31 VISIT FOR SCREENING MAMMOGRAM: ICD-10-CM

## 2022-02-21 PROCEDURE — 77063 BREAST TOMOSYNTHESIS BI: CPT

## 2022-03-18 PROBLEM — J32.9 CHRONIC SINUSITIS: Status: ACTIVE | Noted: 2021-03-12

## 2022-03-18 PROBLEM — E55.9 VITAMIN D DEFICIENCY: Status: ACTIVE | Noted: 2021-03-12

## 2022-03-19 PROBLEM — F32.A MILD DEPRESSION: Status: ACTIVE | Noted: 2019-12-26

## 2022-03-19 PROBLEM — J31.0 NON-ALLERGIC RHINITIS: Status: ACTIVE | Noted: 2021-03-12

## 2022-03-19 PROBLEM — G44.229 CHRONIC TENSION-TYPE HEADACHE, NOT INTRACTABLE: Status: ACTIVE | Noted: 2021-02-16

## 2022-03-19 PROBLEM — M85.80 OSTEOPENIA: Status: ACTIVE | Noted: 2018-12-03

## 2022-03-19 PROBLEM — G60.9 IDIOPATHIC PERIPHERAL NEUROPATHY: Status: ACTIVE | Noted: 2017-12-05

## 2022-03-20 PROBLEM — D89.2 HYPERGAMMAGLOBULINEMIA: Status: ACTIVE | Noted: 2021-03-12

## 2022-06-21 ENCOUNTER — OFFICE VISIT (OUTPATIENT)
Dept: ORTHOPEDIC SURGERY | Age: 77
End: 2022-06-21
Payer: MEDICARE

## 2022-06-21 DIAGNOSIS — M25.561 PAIN IN BOTH KNEES, UNSPECIFIED CHRONICITY: Primary | ICD-10-CM

## 2022-06-21 DIAGNOSIS — M25.562 PAIN IN BOTH KNEES, UNSPECIFIED CHRONICITY: Primary | ICD-10-CM

## 2022-06-21 DIAGNOSIS — M17.0 OSTEOARTHRITIS OF BOTH KNEES, UNSPECIFIED OSTEOARTHRITIS TYPE: ICD-10-CM

## 2022-06-21 PROCEDURE — G8428 CUR MEDS NOT DOCUMENT: HCPCS | Performed by: NURSE PRACTITIONER

## 2022-06-21 PROCEDURE — 99214 OFFICE O/P EST MOD 30 MIN: CPT | Performed by: NURSE PRACTITIONER

## 2022-06-21 PROCEDURE — 1090F PRES/ABSN URINE INCON ASSESS: CPT | Performed by: NURSE PRACTITIONER

## 2022-06-21 PROCEDURE — G8399 PT W/DXA RESULTS DOCUMENT: HCPCS | Performed by: NURSE PRACTITIONER

## 2022-06-21 PROCEDURE — 20610 DRAIN/INJ JOINT/BURSA W/O US: CPT | Performed by: NURSE PRACTITIONER

## 2022-06-21 PROCEDURE — 1123F ACP DISCUSS/DSCN MKR DOCD: CPT | Performed by: NURSE PRACTITIONER

## 2022-06-21 PROCEDURE — G8417 CALC BMI ABV UP PARAM F/U: HCPCS | Performed by: NURSE PRACTITIONER

## 2022-06-21 PROCEDURE — 4004F PT TOBACCO SCREEN RCVD TLK: CPT | Performed by: NURSE PRACTITIONER

## 2022-06-21 RX ORDER — METHYLPREDNISOLONE ACETATE 40 MG/ML
40 INJECTION, SUSPENSION INTRA-ARTICULAR; INTRALESIONAL; INTRAMUSCULAR; SOFT TISSUE ONCE
Status: COMPLETED | OUTPATIENT
Start: 2022-06-21 | End: 2022-06-21

## 2022-06-21 RX ADMIN — METHYLPREDNISOLONE ACETATE 40 MG: 40 INJECTION, SUSPENSION INTRA-ARTICULAR; INTRALESIONAL; INTRAMUSCULAR; SOFT TISSUE at 15:12

## 2022-06-21 NOTE — PROGRESS NOTES
Patient ID:  Charlotte Hernandez  [id number]  15 y.o.  1945    Today: 2022          Chief Complaint:  Bilateral Knee pain    HPI:       Charlotte Hernandez is a 69 y.o. female  that presents today for followup of bilateral knee pain. The patient has previously been evaluated and treated for this problem. The patient complains of knee pain with activities, reports stiffness of the knee with prolonged inactivity, and swelling/pain at the end of the day and after increased physical activity. Pain is described as a general ache with occasional sharp pain, primarily along the joint lines. The pain is rated as ranging from 3-8 with periods of acute exacerbation and periods with less severe pain. Generally, symptoms improve with sitting/rest. The pain affects the patients activities of daily living and quality of life. Patient reports progressive pain and instability in the knee. Patient has attempted prior conservative treatment including over-the-counter medications and activity modification. Patient has previously had an intra-articular steroid injection which at one point has provided 3+ months of pain relief.     Past Medical History:  Past Medical History:   Diagnosis Date    A-fib ([address] 2012    Abnormal Pap smear of cervix     Arthritis     Dyslipidemia 2013    Heart failure ([address]     a fib    History of pulmonary embolism     after hysterectomy    Hypercholesterolemia     Insomnia 2015    Menopause     CHERYL (obstructive sleep apnea) 6/15/2012    PAF (paroxysmal atrial fibrillation) ([address] 6/15/2012    Persistent atrial fibrillation ([address] 2014    Typical atrial flutter (HCC)        Past Surgical History:  Past Surgical History:   Procedure Laterality Date    ABLATION OF DYSRHYTHMIC FOCUS  2014    Heart Ablation     SECTION      x2    CHOLECYSTECTOMY      CYST REMOVAL      HYSTERECTOMY      ORTHOPEDIC SURGERY      lt ganglion cyst removal    OTHER SURGICAL HISTORY      laser surgery for varicose veins    TONSILLECTOMY          Medications:     Prior to Admission medications    Medication Sig Start Date End Date Taking? Authorizing Provider   alendronate (FOSAMAX) 70 MG tablet Take 70 mg by mouth every 7 days 3/4/22   Ar Automatic Reconciliation   apixaban (ELIQUIS) 5 MG TABS tablet TAKE ONE TABLET BY MOUTH TWICE A DAY 9/7/21   Ar Automatic Reconciliation   clobetasol (TEMOVATE) 0.05 % cream Apply topically 2 times daily 3/4/22   Ar Automatic Reconciliation   cyclobenzaprine (FLEXERIL) 10 MG tablet Take 10 mg by mouth 3/4/22   Ar Automatic Reconciliation   ipratropium (ATROVENT) 0.06 % nasal spray 2 sprays by Nasal route 4 times daily 9/7/21   Ar Automatic Reconciliation   magnesium oxide (MAG-OX) 400 MG tablet Take 400 mg by mouth 2 times daily as needed    Ar Automatic Reconciliation   metoprolol succinate (TOPROL XL) 50 MG extended release tablet Take 50 mg by mouth 2 times daily 9/7/21   Ar Automatic Reconciliation   omeprazole (PRILOSEC) 20 MG delayed release capsule Take 20 mg by mouth daily 3/4/22   Ar Automatic Reconciliation   sertraline (ZOLOFT) 100 MG tablet Take 100 mg by mouth daily 3/4/22   Ar Automatic Reconciliation   SUMAtriptan (IMITREX) 50 MG tablet Take 50 mg by mouth once as needed    Ar Automatic Reconciliation       Family History:     Family History   Problem Relation Age of Onset    Stroke Sister     Heart Disease Father     Cancer Father         Colon    Heart Disease Paternal Aunt     Breast Cancer Paternal Aunt 57        63 PLUS YRS    Heart Disease Paternal Uncle     Alzheimer's Disease Mother     Heart Disease Sister        Social History:      Social History     Tobacco Use    Smoking status: Never Smoker    Smokeless tobacco: Never Used   Substance Use Topics    Alcohol use: No         Allergies: Allergies   Allergen Reactions    Codeine Hives     Also caused hallucinations.     Pitavastatin Other (See Comments)     Took zocor, lipitor and muscle symptoms        Vitals: There were no vitals taken for this visit. ROS:  Patient Health Form has been filled out, reviewed, signed and included in the chart. ROS findings related to musculoskeletal problems were discussed with the patient. Patient advised to discuss non-orthopedic complaints with primary care physician. Objective:     Vitals: There were no vitals taken for this visit. General: Awake and alert. AAOx3. Psych: Mood appropriate  HEENT: Normocephalic, atraumatic  Neck: Supple  CV/Pulm: Breathing even and unlabored  Abdomen: Nondistended  Circulation: Normal without obvious arterial or venous deficiency. Pulses palpable bilateral lower extremities. Lymphatic: No obvious lymphedema or lymphadenopathy noted. Skin: No obvious lacerations or rashes noted. Musculoskeletal: No obvious deformity or pain with active movement of the upper extremities. Neuro: No obvious deficiency or weakness noted of the upper or lower extremities    Knee Exam:  There is pain with ROM of both knees. Range of motion on the right is 0-120. Range of motion on the left is 0-120. Trace effusion noted in the knees. Patellofemoral crepitus is present. Tenderness along the joint line both at rest and during motion. There is no significant ligamentous laxity. Distally the patient shows no neurologic deficit. Sensation is grossly intact. The patient is able to grossly plantar- and dorsi-flex the involved foot/ankle. Imaging (obtained today or previously):    Heading: XR Knee 3-4 View  Views: AP knee, skiers PA knee, lateral knee, sunrise view bilateral knee  Clinical indication: Bilateral Knee Pain   Findings: Xrays of the knees obtained today or previously show tricompartmental bone-on-bone arthritic changes with associated osteophyte formation and subchondral sclerosis.   Impression: Bilateral Knee osteoarthritis    VEGA Jin - CNP      Assessment:   1. Bilateral Knee Arthritis    Plan:  Treatment option have been discussed with the patient. The patient understands the nature of knee arthritis and that this is generally a progressive condition. We discussed oral anti-inflammatory medications, activity modifications, physical therapy, corticosteroid injections, weight loss (if appropriate), and surgery. We discussed that given the degenerative nature of the joint that in most cases surgery is the definitive treatment for this condition. We did discuss some of the details of surgery along with some of the risks, benefits and alternatives. At this point the patient is not a candidate for surgery secondary to the patients desire to exhaust conservative treatment options and delay surgery. . At this point the patient has failed the aforementioned treatment modalities and would like to proceed with Corticosteroid Injection. We discussed potential risks of steroid injection including but not limited to steroid flare with an associated increase in pain, fat necrosis, skin discoloration around the injection site, temporary increase in blood sugars in diabetic patients and possible facial flushing after injection. Treatment:    Steroid Injection - Risks, benefits, and alternatives of corticosteroid injection were discussed. After any questions were address the patient wished to proceed with injection. After prepping the injection site each knee was injected with 1cc of 40mg  Depomedrol/3cc marcaine. Patient tolerated the procedure well. Patient is instructed to ice the joint for next few days if they experience discomfort. The patient will followup as directed or as needed.         Signed By: VEGA Mcgregor - NEY  June 21, 2022

## 2022-06-27 ENCOUNTER — OFFICE VISIT (OUTPATIENT)
Dept: ORTHOPEDIC SURGERY | Age: 77
End: 2022-06-27
Payer: MEDICARE

## 2022-06-27 ENCOUNTER — TELEPHONE (OUTPATIENT)
Dept: ORTHOPEDIC SURGERY | Age: 77
End: 2022-06-27

## 2022-06-27 DIAGNOSIS — M20.5X1 ACQUIRED CLAW TOE OF RIGHT FOOT: ICD-10-CM

## 2022-06-27 DIAGNOSIS — M79.671 RIGHT FOOT PAIN: Primary | ICD-10-CM

## 2022-06-27 PROCEDURE — G8399 PT W/DXA RESULTS DOCUMENT: HCPCS | Performed by: ORTHOPAEDIC SURGERY

## 2022-06-27 PROCEDURE — 1123F ACP DISCUSS/DSCN MKR DOCD: CPT | Performed by: ORTHOPAEDIC SURGERY

## 2022-06-27 PROCEDURE — 4004F PT TOBACCO SCREEN RCVD TLK: CPT | Performed by: ORTHOPAEDIC SURGERY

## 2022-06-27 PROCEDURE — 99203 OFFICE O/P NEW LOW 30 MIN: CPT | Performed by: ORTHOPAEDIC SURGERY

## 2022-06-27 PROCEDURE — 1090F PRES/ABSN URINE INCON ASSESS: CPT | Performed by: ORTHOPAEDIC SURGERY

## 2022-06-27 PROCEDURE — G8428 CUR MEDS NOT DOCUMENT: HCPCS | Performed by: ORTHOPAEDIC SURGERY

## 2022-06-27 PROCEDURE — G8417 CALC BMI ABV UP PARAM F/U: HCPCS | Performed by: ORTHOPAEDIC SURGERY

## 2022-06-27 NOTE — PROGRESS NOTES
Name: John Morton  YOB: 1945  Gender: female  MRN: [id number]    Summary:     Right second third and fourth fixed neurogenic claw toe deformities     CC: Foot Pain (right foot pain will xray today)       HPI: John Morton is a 69 y.o. female who presents with Foot Pain (right foot pain will xray today)  . This patient presents the office a longstanding history of right forefoot pain and balance issues. She does have a history of subtle peripheral neuropathy. She complains of pain with rubbing in shoes and balance issues with her right forefoot. History was obtained by Patient     ROS/Meds/PSH/PMH/FH/SH: I personally reviewed the patients standard intake form. Below are the pertinents    Tobacco:  reports that she has never smoked. She has never used smokeless tobacco.  Diabetes: None      Physical Examination:  Examination right foot and ankle shows no evidence of hallux valgus. She does have a fixed second third and fourth claw toe deformities. She has positive MTP joint contractures with volar plate pain located at the second and third metatarsal heads. She has palpable pulses and intact sensation. Imaging:   I independently interpreted XR taken today  Right foot XR: AP, Lateral, Oblique views     ICD-10-CM    1. Right foot pain  M79.671 XR FOOT RIGHT (MIN 3 VIEWS)   2.  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 We discussed the expected result with the stiff toes but in better alignment. She will call us back if she like to proceed. .        Right second third and fourth PIP resection arthroplasties, right second third MTP Weil osteotomies    Outpatient - 1-1/4 hours, c-arm, sagittal saw, K-wires , , Asencio twist off screws screws    Anesthesia -ankle block with monitored anesthesia care       The patient understands the diagnosis of bunions and claw toes, conservative and surgical treatment. R/C outlined including the risk of recurrence, risk of non-healing of skin and bone with/without infection,  potential loss/amputation of a toe(s) secondary to circulation loss, the risk of stiffness in the toes, and the overall risk of swelling that could be prolonged. The patient understands the use of internal and/or external k-wire type fixation and that it may take 6 months to a year before the patient can comfortably get back into regular/dress shoes. Generalized surgical risks and complications were discussed. The patient accepts and would like to proceed with surgery.

## 2022-06-28 ENCOUNTER — TELEPHONE (OUTPATIENT)
Dept: ORTHOPEDIC SURGERY | Age: 77
End: 2022-06-28

## 2022-06-28 DIAGNOSIS — M20.5X1 ACQUIRED CLAW TOE OF RIGHT FOOT: Primary | ICD-10-CM

## 2022-06-28 DIAGNOSIS — M79.671 RIGHT FOOT PAIN: ICD-10-CM

## 2022-07-08 ASSESSMENT — ENCOUNTER SYMPTOMS
DIARRHEA: 0
SHORTNESS OF BREATH: 0
ABDOMINAL DISTENTION: 0
ABDOMINAL PAIN: 0
CONSTIPATION: 0
COUGH: 0
PHOTOPHOBIA: 0
SORE THROAT: 0

## 2022-07-08 NOTE — PROGRESS NOTES
[facility] CARDIOLOGY  [address]  [address]  PHONE: [phone]        NAME:  Yessica Downey  : 1945  MRN: [id number]     PCP:  Yuni Tobias MD      SUBJECTIVE:   Yessica Downey is a 69 y.o. female seen for a follow up visit regarding the following:     Chief Complaint   Patient presents with    Irregular Heart Beat       HPI:    Doing well s/p atrial fibrillation and atrial flutter ablation on 14. She tolerated the procedure well and was initiated on Tikosyn which she continued for about a year. She stopped the Tikosyn on 10/12/2015. Since our last visit she denies palpitations, angina, CHF, presyncope or syncope. Home BP log excellent, see scanned sheet. She wishes to avoid pharmacotherapy and antiarrhythmics. She would rather consider another ablation if possible. Doing great since last visit, exercising without exertional symptoms and dieting with . Past Medical History, Past Surgical History, Family history, Social History, and Medications were all reviewed with the patient today and updated as necessary.      Current Outpatient Medications   Medication Sig Dispense Refill    esomeprazole (NEXIUM) 40 MG delayed release capsule 1 capsule      vitamin D 25 MCG (1000 UT) CAPS Take by mouth daily      ferrous sulfate (IRON 325) 325 (65 Fe) MG tablet Take by mouth every morning (before breakfast)      alendronate (FOSAMAX) 70 MG tablet Take 70 mg by mouth every 7 days      apixaban (ELIQUIS) 5 MG TABS tablet TAKE ONE TABLET BY MOUTH TWICE A DAY      clobetasol (TEMOVATE) 0.05 % cream Apply topically 2 times daily      magnesium oxide (MAG-OX) 400 MG tablet Take 400 mg by mouth 2 times daily as needed      metoprolol succinate (TOPROL XL) 50 MG extended release tablet Take 50 mg by mouth 2 times daily      sertraline (ZOLOFT) 100 MG tablet Take by mouth daily       SUMAtriptan (IMITREX) 50 MG tablet Take 50 mg by mouth once as needed      ipratropium (ATROVENT) 0.06 % nasal spray 2 sprays by Nasal route 4 times daily (Patient not taking: Reported on 7/11/2022)       No current facility-administered medications for this visit. Allergies   Allergen Reactions    Codeine Hives     Also caused hallucinations. Also caused hallucinations. Also caused hallucinations.  Pitavastatin Other (See Comments)     Took zocor, lipitor and muscle symptoms  Other reaction(s): Unknown-Unspecified  Took zocor, lipitor and muscle symptoms  Other reaction(s): Myalgia  Took zocor, lipitor and muscle symptoms  Other reaction(s): Myalgia  Took zocor, lipitor and muscle symptoms         Patient Active Problem List    Diagnosis Date Noted    Acquired claw toe, right 06/28/2022     Overview Note:     Added automatically from request for surgery [id number]      Foot pain, right 06/28/2022     Overview Note:     Added automatically from request for surgery [id number]      Chronic sinusitis 03/12/2021    Vitamin D deficiency 03/12/2021    Non-allergic rhinitis 03/12/2021    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  A-fib ([facility] 07/24/2012     Overview Note:     1. Paroxysmal atrial fibrillation and atrial flutter   -found 4/20/2012: Maddie Shoemaker increased to 225mg tid   -symptoms intermittently: feeling hot, mild shortness of breath, mild   palpitations   -eCardio 5/2012: 5 episodes of AF with HR up to 160s; patient declined   atrial fibrillation ablation   -Tikosyn initiated on 6/15/12; recurrent AF 4/11/14; amiodarone started   -AF ablation 5/19/14  2.  Narrow complex SVT -occurred on stress test in  with heart rate of 180 beats per minute   -concern was raised for possible AVNRT        CHERYL (obstructive sleep apnea) 06/15/2012     Overview Note:     1. Uses oxygen at night and an oral appliance--later caused dental   problems  2. May need referral to sleep specialist.             Past Surgical History:   Procedure Laterality Date    ABLATION OF DYSRHYTHMIC FOCUS  2014    Heart Ablation     SECTION      x2    CHOLECYSTECTOMY      CYST REMOVAL      HYSTERECTOMY (CERVIX STATUS UNKNOWN)      ORTHOPEDIC SURGERY      lt ganglion cyst removal    OTHER SURGICAL HISTORY      laser surgery for varicose veins    TONSILLECTOMY         Family History   Problem Relation Age of Onset    Stroke Sister     Heart Disease Father     Cancer Father         Colon    Heart Disease Paternal Aunt     Breast Cancer Paternal Aunt 57        63 PLUS YRS    Heart Disease Paternal Uncle     Alzheimer's Disease Mother     Heart Disease Sister         Social History     Tobacco Use    Smoking status: Never Smoker    Smokeless tobacco: Never Used   Substance Use Topics    Alcohol use: No       ROS:    Review of Systems   Constitutional: Negative for appetite change, chills, diaphoresis and fatigue. HENT: Negative for congestion, mouth sores, nosebleeds, sore throat and tinnitus. Eyes: Negative for photophobia and visual disturbance. Respiratory: Negative for cough and shortness of breath. Cardiovascular: Negative for chest pain, palpitations and leg swelling. Gastrointestinal: Negative for abdominal distention, abdominal pain, constipation and diarrhea. Endocrine: Negative for cold intolerance, heat intolerance, polydipsia and polyuria. Genitourinary: Negative for dysuria and hematuria. Musculoskeletal: Negative for arthralgias, joint swelling and myalgias. Skin: Negative for rash.    Allergic/Immunologic: Negative for environmental allergies and food allergies. Neurological: Negative for dizziness, seizures, syncope and light-headedness. Hematological: Negative for adenopathy. Does not bruise/bleed easily. Psychiatric/Behavioral: Negative for agitation, behavioral problems, dysphoric mood and hallucinations. The patient is not nervous/anxious. PHYSICAL EXAM:     Vitals:    07/11/22 1417   BP: 120/60   Pulse: 83   Height: 5' 6\" (1.676 m)      Wt Readings from Last 3 Encounters:   03/04/22 161 lb (73 kg)   09/03/21 155 lb (70.3 kg)   07/22/21 156 lb 8 oz (71 kg)      BP Readings from Last 3 Encounters:   07/11/22 120/60   03/04/22 124/82   09/03/21 106/68        Physical Exam  Constitutional:       Appearance: Normal appearance. She is normal weight. HENT:      Head: Normocephalic and atraumatic. Nose: Nose normal.      Mouth/Throat:      Mouth: Mucous membranes are moist.      Pharynx: Oropharynx is clear. Eyes:      Extraocular Movements: Extraocular movements intact. Pupils: Pupils are equal, round, and reactive to light. Neck:      Vascular: No carotid bruit or JVD. Cardiovascular:      Rate and Rhythm: Normal rate and regular rhythm. Heart sounds: No murmur heard. No friction rub. No gallop. Pulmonary:      Effort: Pulmonary effort is normal.      Breath sounds: Normal breath sounds. No wheezing or rhonchi. Abdominal:      General: Abdomen is flat. Bowel sounds are normal. There is no distension. Palpations: Abdomen is soft. Tenderness: There is no abdominal tenderness. Musculoskeletal:         General: No swelling. Normal range of motion. Cervical back: Normal range of motion and neck supple. No tenderness. Skin:     General: Skin is warm and dry. Neurological:      General: No focal deficit present. Mental Status: She is alert and oriented to person, place, and time. Mental status is at baseline.    Psychiatric:         Mood and Affect: Mood normal.         Behavior: Behavior normal. Medical problems and test results were reviewed with the patient today. Lab Results   Component Value Date    CHOL 187 02/17/2021    CHOL 206 (H) 12/30/2019     Lab Results   Component Value Date    TRIG 96 02/17/2021    TRIG 95 12/30/2019     Lab Results   Component Value Date    HDL 46 02/17/2021    HDL 51 12/30/2019     Lab Results   Component Value Date    LDLCALC 121.8 (H) 02/17/2021    LDLCALC 136 12/30/2019     Lab Results   Component Value Date    LABVLDL 19.2 02/17/2021    LABVLDL 19 12/30/2019     Lab Results   Component Value Date    CHOLHDLRATIO 4.1 02/17/2021    CHOLHDLRATIO 4.0 12/30/2019        Lab Results   Component Value Date/Time     03/04/2022 02:39 PM    K 4.7 03/04/2022 02:39 PM    CL 99 03/04/2022 02:39 PM    CO2 20 03/04/2022 02:39 PM    BUN 15 03/04/2022 02:39 PM    CREATININE 0.71 03/04/2022 02:39 PM    GLUCOSE 84 03/04/2022 02:39 PM    CALCIUM 9.2 03/04/2022 02:39 PM         No results for input(s): WBC, HGB, HCT, MCV, PLT in the last 720 hours. No results found for: LABA1C  No results found for: EAG     No results found for: BNP     Lab Results   Component Value Date    TSH 0.951 02/17/2021        Results for orders placed or performed in visit on 07/11/22   EKG 12 lead    Impression    Sinus  Rhythm 83 bpm  Normal axis and intervals, QTc 444 ms  Poor R wave progression  Normal ST and T waves        ASSESSMENT and PLAN    Diagnoses and all orders for this visit:      Paroxysmal atrial fibrillation ([address]- stable, resolved, no change since last visit, no interval palpitations whatsoever. Continue Eliquis and metoprolol for now, see below.    Orders:    -     EKG      Dyslipidemia- stable, continue diet, intolerant to all statins in the past- per Dr. Oscar Knight- continue [employer]/diet/exercise/weight loss measures      CHERYL (obstructive sleep apnea)- continue current regimen and O2 as needed- intolerant to oral appliance and CPAP in the past- continue diet/ exercise/weight loss      History of pulmonary embolism (s/p REDD)- resolved, continue Eliquis anyway given persistent AF history    Preoperative CV evaluation- having toe surgery soon- OK to hold 3 days prior to surgery, resume post op when OK with operating MD                 Return in about 1 year (around 7/11/2023).          Claire Hollins MD  7/11/2022  2:31 PM

## 2022-07-11 ENCOUNTER — OFFICE VISIT (OUTPATIENT)
Dept: CARDIOLOGY CLINIC | Age: 77
End: 2022-07-11
Payer: MEDICARE

## 2022-07-11 VITALS
BODY MASS INDEX: 25.99 KG/M2 | DIASTOLIC BLOOD PRESSURE: 60 MMHG | HEART RATE: 83 BPM | SYSTOLIC BLOOD PRESSURE: 120 MMHG | HEIGHT: 66 IN

## 2022-07-11 DIAGNOSIS — I48.19 PERSISTENT ATRIAL FIBRILLATION (HCC): Primary | ICD-10-CM

## 2022-07-11 DIAGNOSIS — E78.5 DYSLIPIDEMIA: ICD-10-CM

## 2022-07-11 DIAGNOSIS — G47.33 OSA (OBSTRUCTIVE SLEEP APNEA): ICD-10-CM

## 2022-07-11 PROCEDURE — 93000 ELECTROCARDIOGRAM COMPLETE: CPT | Performed by: INTERNAL MEDICINE

## 2022-07-11 PROCEDURE — 1123F ACP DISCUSS/DSCN MKR DOCD: CPT | Performed by: INTERNAL MEDICINE

## 2022-07-11 PROCEDURE — 1036F TOBACCO NON-USER: CPT | Performed by: INTERNAL MEDICINE

## 2022-07-11 PROCEDURE — G8417 CALC BMI ABV UP PARAM F/U: HCPCS | Performed by: INTERNAL MEDICINE

## 2022-07-11 PROCEDURE — 99214 OFFICE O/P EST MOD 30 MIN: CPT | Performed by: INTERNAL MEDICINE

## 2022-07-11 PROCEDURE — 1090F PRES/ABSN URINE INCON ASSESS: CPT | Performed by: INTERNAL MEDICINE

## 2022-07-11 PROCEDURE — G8399 PT W/DXA RESULTS DOCUMENT: HCPCS | Performed by: INTERNAL MEDICINE

## 2022-07-11 PROCEDURE — G8427 DOCREV CUR MEDS BY ELIG CLIN: HCPCS | Performed by: INTERNAL MEDICINE

## 2022-07-11 RX ORDER — ESOMEPRAZOLE MAGNESIUM 40 MG/1
CAPSULE, DELAYED RELEASE ORAL
COMMUNITY
End: 2022-09-09 | Stop reason: SDUPTHER

## 2022-07-11 RX ORDER — FERROUS SULFATE 325(65) MG
TABLET ORAL
COMMUNITY
End: 2022-07-25

## 2022-07-12 ENCOUNTER — TELEPHONE (OUTPATIENT)
Dept: ORTHOPEDIC SURGERY | Age: 77
End: 2022-07-12

## 2022-07-25 RX ORDER — CYCLOBENZAPRINE HCL 10 MG
10 TABLET ORAL 3 TIMES DAILY PRN
COMMUNITY
End: 2022-09-09 | Stop reason: SDUPTHER

## 2022-07-25 NOTE — PRE-PROCEDURE INSTRUCTIONS
Patient verified name and . Order for consent was found in EHR and matches case posting; patient verifies procedure. Type IB surgery, phone assessment complete. Orders were received. Labs per surgeon: none found for PAT. Labs per anesthesia protocol: none    Patient answered medical/surgical history questions at their best of ability. All prior to admission medications documented in [facility]. Patient instructed to take the following medications the day of surgery according to anesthesia guidelines with a small sip of water: Metoprolol, Esomeprazole. On the day before surgery please take Acetaminophen 1000mg in the morning and then again before bed. You may substitute for Tylenol 650 mg. Hold all vitamins 7 days prior to surgery and NSAIDS 5 days prior to surgery. Prescription meds to hold:magnesium. Patient has been holding Eliquis since 2022. Patient instructed on the following:    > Arrive at Outpatient Entrance, time of arrival to be called the day before by 1700  > NPO after midnight, unless otherwise indicated, including gum, mints, and ice chips  > Responsible adult must drive patient to the hospital, stay during surgery, and patient will need supervision 24 hours after anesthesia  > Use antibacterial soap in shower the night before surgery and on the morning of surgery  > All piercings must be removed prior to arrival.    > Leave all valuables (money and jewelry) at home but bring insurance card and ID on DOS.   > You may be required to pay a deductible or co-pay on the day of your procedure. You can pre-pay by calling [phone] if your surgery is at the [facility] or [phone] if your surgery is at the [facility]. > Do not wear make-up, nail polish, lotions, cologne, perfumes, powders, or oil on skin. Artificial nails are not permitted.

## 2022-07-26 ENCOUNTER — ANESTHESIA (OUTPATIENT)
Dept: SURGERY | Age: 77
End: 2022-07-26
Payer: MEDICARE

## 2022-07-26 ENCOUNTER — ANESTHESIA EVENT (OUTPATIENT)
Dept: SURGERY | Age: 77
End: 2022-07-26
Payer: MEDICARE

## 2022-07-26 ENCOUNTER — APPOINTMENT (OUTPATIENT)
Dept: GENERAL RADIOLOGY | Age: 77
End: 2022-07-26
Attending: ORTHOPAEDIC SURGERY
Payer: MEDICARE

## 2022-07-26 ENCOUNTER — HOSPITAL ENCOUNTER (OUTPATIENT)
Age: 77
Setting detail: OUTPATIENT SURGERY
Discharge: HOME | End: 2022-07-26
Attending: ORTHOPAEDIC SURGERY | Admitting: ORTHOPAEDIC SURGERY
Payer: MEDICARE

## 2022-07-26 VITALS
HEIGHT: 66 IN | DIASTOLIC BLOOD PRESSURE: 58 MMHG | RESPIRATION RATE: 16 BRPM | SYSTOLIC BLOOD PRESSURE: 122 MMHG | OXYGEN SATURATION: 100 % | WEIGHT: 154 LBS | BODY MASS INDEX: 24.75 KG/M2 | HEART RATE: 70 BPM | TEMPERATURE: 98 F

## 2022-07-26 DIAGNOSIS — M20.5X1 ACQUIRED CLAW TOE, RIGHT: ICD-10-CM

## 2022-07-26 DIAGNOSIS — G89.18 ACUTE POST-OPERATIVE PAIN: Primary | ICD-10-CM

## 2022-07-26 DIAGNOSIS — M79.671 FOOT PAIN, RIGHT: ICD-10-CM

## 2022-07-26 PROCEDURE — 6360000002 HC RX W HCPCS: Performed by: NURSE ANESTHETIST, CERTIFIED REGISTERED

## 2022-07-26 PROCEDURE — 2580000003 HC RX 258: Performed by: ANESTHESIOLOGY

## 2022-07-26 PROCEDURE — C1713 ANCHOR/SCREW BN/BN,TIS/BN: HCPCS | Performed by: ORTHOPAEDIC SURGERY

## 2022-07-26 PROCEDURE — 3600000014 HC SURGERY LEVEL 4 ADDTL 15MIN: Performed by: ORTHOPAEDIC SURGERY

## 2022-07-26 PROCEDURE — 2709999900 HC NON-CHARGEABLE SUPPLY: Performed by: ORTHOPAEDIC SURGERY

## 2022-07-26 PROCEDURE — 3700000001 HC ADD 15 MINUTES (ANESTHESIA): Performed by: ORTHOPAEDIC SURGERY

## 2022-07-26 PROCEDURE — 64450 NJX AA&/STRD OTHER PN/BRANCH: CPT | Performed by: ANESTHESIOLOGY

## 2022-07-26 PROCEDURE — 28285 REPAIR OF HAMMERTOE: CPT | Performed by: ORTHOPAEDIC SURGERY

## 2022-07-26 PROCEDURE — 6360000002 HC RX W HCPCS: Performed by: NURSE PRACTITIONER

## 2022-07-26 PROCEDURE — 7100000000 HC PACU RECOVERY - FIRST 15 MIN: Performed by: ORTHOPAEDIC SURGERY

## 2022-07-26 PROCEDURE — 6370000000 HC RX 637 (ALT 250 FOR IP): Performed by: ANESTHESIOLOGY

## 2022-07-26 PROCEDURE — 6360000002 HC RX W HCPCS: Performed by: ANESTHESIOLOGY

## 2022-07-26 PROCEDURE — 28313 REPAIR DEFORMITY OF TOE: CPT | Performed by: ORTHOPAEDIC SURGERY

## 2022-07-26 PROCEDURE — 7100000001 HC PACU RECOVERY - ADDTL 15 MIN: Performed by: ORTHOPAEDIC SURGERY

## 2022-07-26 PROCEDURE — 28308 INCISION OF METATARSAL: CPT | Performed by: ORTHOPAEDIC SURGERY

## 2022-07-26 PROCEDURE — 7100000010 HC PHASE II RECOVERY - FIRST 15 MIN: Performed by: ORTHOPAEDIC SURGERY

## 2022-07-26 PROCEDURE — 2500000003 HC RX 250 WO HCPCS: Performed by: NURSE ANESTHETIST, CERTIFIED REGISTERED

## 2022-07-26 PROCEDURE — 3700000000 HC ANESTHESIA ATTENDED CARE: Performed by: ORTHOPAEDIC SURGERY

## 2022-07-26 PROCEDURE — 7100000011 HC PHASE II RECOVERY - ADDTL 15 MIN: Performed by: ORTHOPAEDIC SURGERY

## 2022-07-26 PROCEDURE — 3600000004 HC SURGERY LEVEL 4 BASE: Performed by: ORTHOPAEDIC SURGERY

## 2022-07-26 DEVICE — SNAP-OFF SCREW
Type: IMPLANTABLE DEVICE | Site: TOES | Status: FUNCTIONAL
Brand: CHARLOTTE

## 2022-07-26 DEVICE — WIRE ORTH 1.1MM DIA 229MM SMOOTH DBL BAYNT TIP S STL K: Type: IMPLANTABLE DEVICE | Site: TOES | Status: FUNCTIONAL

## 2022-07-26 RX ORDER — ROPIVACAINE HYDROCHLORIDE 5 MG/ML
INJECTION, SOLUTION EPIDURAL; INFILTRATION; PERINEURAL
Status: COMPLETED | OUTPATIENT
Start: 2022-07-26 | End: 2022-07-26

## 2022-07-26 RX ORDER — HYDROMORPHONE HYDROCHLORIDE 2 MG/ML
0.5 INJECTION, SOLUTION INTRAMUSCULAR; INTRAVENOUS; SUBCUTANEOUS EVERY 5 MIN PRN
Status: DISCONTINUED | OUTPATIENT
Start: 2022-07-26 | End: 2022-07-26 | Stop reason: HOSPADM

## 2022-07-26 RX ORDER — PROPOFOL 10 MG/ML
INJECTION, EMULSION INTRAVENOUS CONTINUOUS PRN
Status: DISCONTINUED | OUTPATIENT
Start: 2022-07-26 | End: 2022-07-26 | Stop reason: SDUPTHER

## 2022-07-26 RX ORDER — ACETAMINOPHEN 500 MG
1000 TABLET ORAL ONCE
Status: COMPLETED | OUTPATIENT
Start: 2022-07-26 | End: 2022-07-26

## 2022-07-26 RX ORDER — SODIUM CHLORIDE 0.9 % (FLUSH) 0.9 %
5-40 SYRINGE (ML) INJECTION EVERY 12 HOURS SCHEDULED
Status: DISCONTINUED | OUTPATIENT
Start: 2022-07-26 | End: 2022-07-26 | Stop reason: HOSPADM

## 2022-07-26 RX ORDER — LIDOCAINE HYDROCHLORIDE 10 MG/ML
1 INJECTION, SOLUTION INFILTRATION; PERINEURAL
Status: DISCONTINUED | OUTPATIENT
Start: 2022-07-26 | End: 2022-07-26 | Stop reason: HOSPADM

## 2022-07-26 RX ORDER — SODIUM CHLORIDE 0.9 % (FLUSH) 0.9 %
5-40 SYRINGE (ML) INJECTION PRN
Status: DISCONTINUED | OUTPATIENT
Start: 2022-07-26 | End: 2022-07-26 | Stop reason: HOSPADM

## 2022-07-26 RX ORDER — PROCHLORPERAZINE EDISYLATE 5 MG/ML
5 INJECTION INTRAMUSCULAR; INTRAVENOUS
Status: DISCONTINUED | OUTPATIENT
Start: 2022-07-26 | End: 2022-07-26 | Stop reason: HOSPADM

## 2022-07-26 RX ORDER — PHENYLEPHRINE HYDROCHLORIDE 10 MG/ML
INJECTION INTRAVENOUS PRN
Status: DISCONTINUED | OUTPATIENT
Start: 2022-07-26 | End: 2022-07-26 | Stop reason: SDUPTHER

## 2022-07-26 RX ORDER — HYDROCODONE BITARTRATE AND ACETAMINOPHEN 7.5; 325 MG/1; MG/1
1 TABLET ORAL EVERY 6 HOURS PRN
Qty: 20 TABLET | Refills: 0 | Status: SHIPPED | OUTPATIENT
Start: 2022-07-26 | End: 2022-07-31

## 2022-07-26 RX ORDER — LIDOCAINE HYDROCHLORIDE 20 MG/ML
INJECTION, SOLUTION EPIDURAL; INFILTRATION; INTRACAUDAL; PERINEURAL PRN
Status: DISCONTINUED | OUTPATIENT
Start: 2022-07-26 | End: 2022-07-26 | Stop reason: SDUPTHER

## 2022-07-26 RX ORDER — MIDAZOLAM HYDROCHLORIDE 2 MG/2ML
2 INJECTION, SOLUTION INTRAMUSCULAR; INTRAVENOUS
Status: COMPLETED | OUTPATIENT
Start: 2022-07-26 | End: 2022-07-26

## 2022-07-26 RX ORDER — CEPHALEXIN 500 MG/1
500 CAPSULE ORAL 4 TIMES DAILY
Qty: 12 CAPSULE | Refills: 0 | Status: SHIPPED | OUTPATIENT
Start: 2022-07-26 | End: 2022-09-09 | Stop reason: ALTCHOICE

## 2022-07-26 RX ORDER — DIPHENHYDRAMINE HCL 25 MG
25 TABLET ORAL EVERY 8 HOURS PRN
Qty: 30 TABLET | Refills: 0 | Status: SHIPPED | OUTPATIENT
Start: 2022-07-26 | End: 2022-08-25

## 2022-07-26 RX ORDER — MEPERIDINE HYDROCHLORIDE 25 MG/ML
12.5 INJECTION INTRAMUSCULAR; INTRAVENOUS; SUBCUTANEOUS EVERY 5 MIN PRN
Status: DISCONTINUED | OUTPATIENT
Start: 2022-07-26 | End: 2022-07-26 | Stop reason: HOSPADM

## 2022-07-26 RX ORDER — FENTANYL CITRATE 50 UG/ML
100 INJECTION, SOLUTION INTRAMUSCULAR; INTRAVENOUS
Status: COMPLETED | OUTPATIENT
Start: 2022-07-26 | End: 2022-07-26

## 2022-07-26 RX ORDER — SODIUM CHLORIDE, SODIUM LACTATE, POTASSIUM CHLORIDE, CALCIUM CHLORIDE 600; 310; 30; 20 MG/100ML; MG/100ML; MG/100ML; MG/100ML
INJECTION, SOLUTION INTRAVENOUS CONTINUOUS
Status: DISCONTINUED | OUTPATIENT
Start: 2022-07-26 | End: 2022-07-26 | Stop reason: HOSPADM

## 2022-07-26 RX ORDER — OXYCODONE HYDROCHLORIDE 5 MG/1
5 TABLET ORAL
Status: DISCONTINUED | OUTPATIENT
Start: 2022-07-26 | End: 2022-07-26 | Stop reason: HOSPADM

## 2022-07-26 RX ORDER — ONDANSETRON 2 MG/ML
4 INJECTION INTRAMUSCULAR; INTRAVENOUS
Status: DISCONTINUED | OUTPATIENT
Start: 2022-07-26 | End: 2022-07-26 | Stop reason: HOSPADM

## 2022-07-26 RX ORDER — SODIUM CHLORIDE 9 MG/ML
INJECTION, SOLUTION INTRAVENOUS PRN
Status: DISCONTINUED | OUTPATIENT
Start: 2022-07-26 | End: 2022-07-26 | Stop reason: HOSPADM

## 2022-07-26 RX ORDER — EPHEDRINE SULFATE/0.9% NACL/PF 50 MG/5 ML
SYRINGE (ML) INTRAVENOUS PRN
Status: DISCONTINUED | OUTPATIENT
Start: 2022-07-26 | End: 2022-07-26 | Stop reason: SDUPTHER

## 2022-07-26 RX ADMIN — ACETAMINOPHEN 1000 MG: 500 TABLET, FILM COATED ORAL at 07:48

## 2022-07-26 RX ADMIN — LIDOCAINE HYDROCHLORIDE 40 MG: 20 INJECTION, SOLUTION EPIDURAL; INFILTRATION; INTRACAUDAL; PERINEURAL at 08:27

## 2022-07-26 RX ADMIN — FENTANYL CITRATE 50 MCG: 50 INJECTION, SOLUTION INTRAMUSCULAR; INTRAVENOUS at 07:48

## 2022-07-26 RX ADMIN — SODIUM CHLORIDE, POTASSIUM CHLORIDE, SODIUM LACTATE AND CALCIUM CHLORIDE: 600; 310; 30; 20 INJECTION, SOLUTION INTRAVENOUS at 07:49

## 2022-07-26 RX ADMIN — PROPOFOL 40 MG: 10 INJECTION, EMULSION INTRAVENOUS at 08:30

## 2022-07-26 RX ADMIN — ROPIVACAINE HYDROCHLORIDE 30 ML: 5 INJECTION, SOLUTION EPIDURAL; INFILTRATION; PERINEURAL at 07:52

## 2022-07-26 RX ADMIN — PROPOFOL 125 MCG/KG/MIN: 10 INJECTION, EMULSION INTRAVENOUS at 08:27

## 2022-07-26 RX ADMIN — MIDAZOLAM 1 MG: 1 INJECTION INTRAMUSCULAR; INTRAVENOUS at 07:48

## 2022-07-26 RX ADMIN — MEPIVACAINE HYDROCHLORIDE 10 ML: 15 INJECTION, SOLUTION EPIDURAL; INFILTRATION at 07:52

## 2022-07-26 RX ADMIN — Medication 10 MG: at 08:55

## 2022-07-26 RX ADMIN — PHENYLEPHRINE HYDROCHLORIDE 100 MCG: 10 INJECTION INTRAVENOUS at 09:08

## 2022-07-26 RX ADMIN — Medication 2000 MG: at 08:25

## 2022-07-26 NOTE — BRIEF OP NOTE
Brief Postoperative Note      Patient: Devon Orozco  YOB: 1945  MRN: [id number]    Date of Procedure: 7/26/2022    Pre-Op Diagnosis: Acquired claw toe, right [M20.5X1]  Foot pain, right [M79.671]    Post-Op Diagnosis: Same       Procedure(s):  right second, third, fourth PIP(proximal interphalangeal) resection arthroplasties   28285x3  right second, third MTP (metatarsophalangeal)  Weil osteotomies       Surgeon(s):  Devonte Cote MD    Assistant:  * No surgical staff found *    Anesthesia: Monitor Anesthesia Care    Estimated Blood Loss (mL): Minimal    Complications: None    Specimens:   * No specimens in log *    Implants:  Implant Name Type Inv.  Item Serial No.  Lot No. LRB No. Used Action   SCREW BNE L11MM DIA2MM MORGAN TI [address] SELF DRL [facility] [id number]  SCREW BNE L11MM DIA2MM MORGAN TI [address] SELF DRL [facility]evert [facility]- [id number] Right 2 Implanted   WIRE ORTH 1.1MM MARILEE 229MM SMOOTH DBL BAYNT Hillery Opitz - [id number]  WIRE ORTH 1.1MM MARILEE 229MM SMOOTH DBL BAYNT Hillery Opitz  [employer] [id number] Right 1 Implanted   WIRE ORTH 1.1MM MARILEE 229MM SMOOTH DBL BAYNT Hillery Opitz - [id number]  WIRE ORTH 1.1MM MARILEE 229MM SMOOTH DBL BAYNT Hillery Opitz  [employer] [id number] Right 1 Implanted   WIRE ORTH 1.1MM MARILEE 229MM SMOOTH DBL BAYNT Francesco Britthire  WIRE ORTH 1.1MM MARILEE 229MM SMOOTH DBL BAYNT Danielle Sutherland [id number] Right 1 Implanted         Drains: * No LDAs found *    Findings:     Electronically signed by Bijal Lockett MD on 7/26/2022 at 9:04 AM

## 2022-07-26 NOTE — DISCHARGE INSTRUCTIONS
INSTRUCTIONS FOLLOWING FOOT SURGERY    ACTIVITY  Elevate foot. No Ice    1.)No weight bearing. Use crutches or knee walker until you have full motor and sensory function of operative leg, then #2 activity. 2.)Protected partial weight bearing on the heel only as tolerated in post op shoe after full sensation returns. Blood clot prevention:  As instructed by Dr Pina Meza: Take 81 mg twice daily if okay with your medical doctor and you have no GI ulcer. Get up and out of bed frequently. While in bed move the legs as much as possible. DRESSING CARE Keep dry and in place until follow up appointment. Cover with cast bag or plastic bag when showering. Let the office know if dressing gets saturated with water. CALL YOUR DOCTOR IF YOU HAVE  Excessive bleeding that does not stop after holding mild pressure over the area. Temperature of 101 degrees or above. Redness, excessive swelling or bruising, and/or green or yellow, smelly discharge from incision. Loss of sensation - cold, white or blue toes. DIET  Day of Surgery: Clear liquids until no nausea or vomiting; then light, bland diet (Baked chicken, plain rice, grits, scrambled egg, toast). Nothing Greasy, fried or spicy today  Advance to regular diet on second day, unless your doctor orders otherwise. PAIN  Take pain medications as directed by your doctor. Call your doctor if pain is NOT relieved by medication. MEDICATION INTERACTION:  During your procedure you potentially received a medication or medications which may reduce the effectiveness of oral contraceptives. Please consider other forms of contraception for 1 month following your procedure if you are currently using oral contraceptives as your primary form of birth control.  In addition to this, we recommend continuing your oral contraceptive as prescribed, unless otherwise instructed by your physician, during this time    After general anesthesia or intravenous sedation, for 24 hours or while taking prescription Narcotics:  Limit your activities  A responsible adult needs to be with you for the next 24 hours  Do not drive and operate hazardous machinery  Do not make important personal or business decisions  Do not drink alcoholic beverages  If you have not urinated within 8 hours after discharge, and you are experiencing discomfort from urinary retention, please go to the nearest ED. If you have sleep apnea and have a CPAP machine, please use it for all naps and sleeping. Please use caution when taking narcotics and any of your home medications that may cause drowsiness. *  Please give a list of your current medications to your Primary Care Provider. *  Please update this list whenever your medications are discontinued, doses are      changed, or new medications (including over-the-counter products) are added. *  Please carry medication information at all times in case of emergency situations. These are general instructions for a healthy lifestyle:  No smoking/ No tobacco products/ Avoid exposure to second hand smoke  Surgeon General's Warning:  Quitting smoking now greatly reduces serious risk to your health. Obesity, smoking, and sedentary lifestyle greatly increases your risk for illness  A healthy diet, regular physical exercise & weight monitoring are important for maintaining a healthy lifestyle    You may be retaining fluid if you have a history of heart failure or if you experience any of the following symptoms:  Weight gain of 3 pounds or more overnight or 5 pounds in a week, increased swelling in our hands or feet or shortness of breath while lying flat in bed. Please call your doctor as soon as you notice any of these symptoms; do not wait until your next office visit. Learning About How to Use Crutches  Your Care Instructions  Crutches can help you walk when you have an injured hip, leg, knee, ankle, or foot.  Your doctor will tell you how much weight--if any--you can put on your leg. Be sure your crutches fit you. When you stand up in your normal posture, there should be space for two or three fingers between the top of the crutch and your armpit. When you let your hands hang down, the hand  should be at your wrists. When you put your hands on the hand , your elbows should be slightly bent. To stay safe when using crutches:  Look straight ahead, not down at your feet. Clear away small rugs, cords, or anything else that could cause you to trip, slip, or fall. Be very careful around pets and small children. They can get in your path when you least expect it. Be sure the rubber tips on your crutches are clean and in good condition to help prevent slipping. Avoid slick conditions, such as wet floors and snowy or icy driveways. In bad weather, be especially careful on curbs and steps. How to use crutches  Getting ready to walk    [facility] your elbows slightly. Press the padded top parts of the crutches against your sides, under your armpits. If you have been told not to put any weight on your injured leg, keep that leg bent and off the ground. Walking with crutches    Put both crutches about 12 inches in front of you. Put your weight on the handgrips, not on the pads under your arms. (Constant pressure against your underarms can cause numbness.) Swing your body forward. (If you have been told not to put any weight on your injured leg, keep that leg bent and off the ground.)  To complete the step, put your weight on the strong leg. Move your crutches about 12 inches in front of you, and start the next step. When you're confident using the crutches, you can move the crutches and your injured leg at the same time. Then push straight down on the crutches as you step past the crutches with your strong leg, as you would in normal walking. Take small steps. Use ramps and elevators when you can. Sitting down    To sit, back up to the chair.  Use one hand to hold both crutches by the handgrips, beside your injured leg. With the other hand, hold onto the seat and slowly lower yourself onto the chair. Lay the crutches on the ground near your chair. If you prop them up, they may fall over. Getting up from a chair    To get up from a chair,  the crutches and put them in one hand beside your injured leg. Put your weight on the handgrips of the crutches and on your strong leg to stand up. Walking up stairs    To go up stairs, step up with your strong leg and then bring the crutches and your injured leg to the upper step. For stairs that have handrails: Put both crutches under the arm opposite the handrail. Use the hand opposite the handrail to hold both crutches by the handgrips. Hold onto the handrail as you go up. Put your strong leg on the step first when you go up. Walking down stairs    To go down stairs, put your crutches and injured leg on the lower step. Bring your strong leg to the lower step. This saying may help you remember: \"Up with the good, down with the bad. \"  For stairs that have handrails: Put both crutches under the arm opposite the handrail. Use the hand opposite the handrail to hold both crutches by the handgrips. Hold onto the handrail as you go down. Follow the same process you use for stairs: Lead with your crutches and injured leg on the way down.

## 2022-07-26 NOTE — H&P
Outpatient Surgery History and Physical:  Ban Chau was seen and examined. CHIEF COMPLAINT:   right foot. PE:   BP (!) 152/72   Pulse (!) 104   Temp 98.5 °F (36.9 °C) (Oral)   Resp 18   Ht 5' 6\" (1.676 m)   Wt 154 lb (69.9 kg)   SpO2 100%   BMI 24.86 kg/m²     Heart:   Regular rhythm      Lungs:  Are clear      Past Medical History:    Past Medical History:   Diagnosis Date    A-fib ([address] 2012    Abnormal Pap smear of cervix     Acute anemia     unknown bleed    Arthritis     Dyslipidemia 2013    GERD (gastroesophageal reflux disease)     Heart failure (HCC)     a fib    History of blood transfusion     History of pulmonary embolism     after hysterectomy    Hypercholesterolemia     Insomnia 2015    Menopause     CHERYL (obstructive sleep apnea) 6/15/2012    PAF (paroxysmal atrial fibrillation) ([address] 6/15/2012    PE (pulmonary thromboembolism) [facility])     age 46 years after hysterectomy    Persistent atrial fibrillation ([address] 2014    Typical atrial flutter ([address]        Surgical History:   Past Surgical History:   Procedure Laterality Date    ABLATION OF DYSRHYTHMIC FOCUS  2014    Heart Ablation    CATARACT EXTRACTION       SECTION      x2    CHOLECYSTECTOMY      COLONOSCOPY      CYST REMOVAL      HYSTERECTOMY (CERVIX STATUS UNKNOWN)      ORTHOPEDIC SURGERY      lt ganglion cyst removal    OTHER SURGICAL HISTORY      laser surgery for varicose veins    TONSILLECTOMY         Social History: Patient  reports that she has never smoked. She has never used smokeless tobacco. She reports that she does not drink alcohol and does not use drugs.     Family History:   Family History   Problem Relation Age of Onset    Stroke Sister     Heart Disease Father     Cancer Father         Colon    Heart Disease Paternal Aunt     Breast Cancer Paternal Aunt 57        63 PLUS YRS    Heart Disease Paternal Uncle     Alzheimer's Disease Mother     Heart Disease Sister        Allergies: Reviewed per EMR  Codeine and Pitavastatin    Medications:    Prior to Admission medications    Medication Sig Start Date End Date Taking? Authorizing Provider   cyclobenzaprine (FLEXERIL) 10 MG tablet Take 10 mg by mouth 3 times daily as needed for Muscle spasms   Yes Historical Provider, MD   esomeprazole (NEXIUM) 40 MG delayed release capsule every morning (before breakfast)    Historical Provider, MD   vitamin D 25 MCG (1000 UT) CAPS Take by mouth daily    Historical Provider, MD   alendronate (FOSAMAX) 70 MG tablet Take 70 mg by mouth every 7 days 3/4/22   Ar Automatic Reconciliation   apixaban (ELIQUIS) 5 MG TABS tablet TAKE ONE TABLET BY MOUTH TWICE A DAY 9/7/21   Ar Automatic Reconciliation   clobetasol (TEMOVATE) 0.05 % cream Apply topically 2 times daily 3/4/22   Ar Automatic Reconciliation   magnesium oxide (MAG-OX) 400 MG tablet Take 400 mg by mouth 2 times daily as needed    Ar Automatic Reconciliation   metoprolol succinate (TOPROL XL) 50 MG extended release tablet Take 50 mg by mouth 2 times daily 9/7/21   Ar Automatic Reconciliation   sertraline (ZOLOFT) 100 MG tablet Take by mouth at bedtime 3/4/22   Ar Automatic Reconciliation   SUMAtriptan (IMITREX) 50 MG tablet Take 50 mg by mouth once as needed    Ar Automatic Reconciliation     The surgery is planned for the right second, third MTP (metatarsophalangeal)  Weil osteotomies          right second, third, fourth PIP(proximal interphalangeal) resection arthroplasties           The patient is here today for outpatient surgery. I have examined the patient, no changes are noted in the patient's medical status. Necessity for the procedure/care is still present and the history and physical above is current. See the office notes for the full long term history of the problem. Please see the recent office notes for the musculoskeletal examination.     Signed By: VEGA Moura CNP     July 26, 2022 6:54 AM

## 2022-07-26 NOTE — ANESTHESIA PRE PROCEDURE
Department of Anesthesiology  Preprocedure Note       Name:  Rian Smith   Age:  69 y.o.  :  1945                                          MRN:  [id number]         Date:  2022      Surgeon: Kim Hancock):  Dena Paulson MD    Procedure: Procedure(s):  right second, third, fourth PIP(proximal interphalangeal) resection arthroplasties   28285x3  right second, third MTP (metatarsophalangeal)  Weil osteotomies       Medications prior to admission:   Prior to Admission medications    Medication Sig Start Date End Date Taking?  Authorizing Provider   cyclobenzaprine (FLEXERIL) 10 MG tablet Take 10 mg by mouth 3 times daily as needed for Muscle spasms   Yes Historical Provider, MD   esomeprazole (NEXIUM) 40 MG delayed release capsule every morning (before breakfast)    Historical Provider, MD   vitamin D 25 MCG (1000 UT) CAPS Take by mouth daily    Historical Provider, MD   alendronate (FOSAMAX) 70 MG tablet Take 70 mg by mouth every 7 days 3/4/22   Ar Automatic Reconciliation   apixaban (ELIQUIS) 5 MG TABS tablet TAKE ONE TABLET BY MOUTH TWICE A DAY 21   Ar Automatic Reconciliation   clobetasol (TEMOVATE) 0.05 % cream Apply topically 2 times daily 3/4/22   Ar Automatic Reconciliation   magnesium oxide (MAG-OX) 400 MG tablet Take 400 mg by mouth 2 times daily as needed    Ar Automatic Reconciliation   metoprolol succinate (TOPROL XL) 50 MG extended release tablet Take 50 mg by mouth 2 times daily 21   Ar Automatic Reconciliation   sertraline (ZOLOFT) 100 MG tablet Take by mouth at bedtime 3/4/22   Ar Automatic Reconciliation   SUMAtriptan (IMITREX) 50 MG tablet Take 50 mg by mouth once as needed    Ar Automatic Reconciliation       Current medications:    Current Facility-Administered Medications   Medication Dose Route Frequency Provider Last Rate Last Admin    ceFAZolin (ANCEF) 2000 mg in sterile water 20 mL IV syringe  2,000 mg IntraVENous On Call to Meme 36, APRN - CNP        lactated ringers infusion   IntraVENous Continuous Layton Loza, APRN - CNP        sodium chloride flush 0.9 % injection 5-40 mL  5-40 mL IntraVENous 2 times per day Layton Loza, APRN - CNP        sodium chloride flush 0.9 % injection 5-40 mL  5-40 mL IntraVENous PRN Layton Loza, APRN - CNP        0.9 % sodium chloride infusion   IntraVENous PRN Layton Olza, APRN - CNP        lidocaine 1 % injection 1 mL  1 mL IntraDERmal Once PRN Lucie Young MD        acetaminophen (TYLENOL) tablet 1,000 mg  1,000 mg Oral Once Lucie Young MD        fentaNYL (SUBLIMAZE) injection 100 mcg  100 mcg IntraVENous Once PRN Lucie Young MD        lactated ringers infusion   IntraVENous Continuous Lucie Young MD        sodium chloride flush 0.9 % injection 5-40 mL  5-40 mL IntraVENous 2 times per day Lucie Young MD        sodium chloride flush 0.9 % injection 5-40 mL  5-40 mL IntraVENous PRN Lucie Young MD        0.9 % sodium chloride infusion   IntraVENous PRN Lucie Young MD        midazolam PF (VERSED) injection 2 mg  2 mg IntraVENous Once PRN Lucie Young MD           Allergies: Allergies   Allergen Reactions    Codeine Hives     Also caused hallucinations. Also caused hallucinations. Also caused hallucinations.     Pitavastatin Other (See Comments)     Took zocor, lipitor and muscle symptoms  Other reaction(s): Unknown-Unspecified  Took zocor, lipitor and muscle symptoms  Other reaction(s): Myalgia  Took zocor, lipitor and muscle symptoms  Other reaction(s): Myalgia  Took zocor, lipitor and muscle symptoms         Problem List:    Patient Active Problem List   Diagnosis Code    A-fib ([facility] I48.91    Dyslipidemia E78.5    Insomnia G47.00    Chronic sinusitis J32.9    Vitamin D deficiency E55.9    Persistent atrial fibrillation ([facility] I48.19    CHERYL (obstructive sleep apnea) G47.33    Non-allergic rhinitis J31.0    Chronic tension-type headache, not intractable G44.229    Osteopenia M85.80    Mild depression (HCC) F32.0    Idiopathic peripheral neuropathy G60.9    Health care maintenance Z00.00    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    Acquired claw toe, right M20.5X1    Foot pain, right M79.671       Past Medical History:        Diagnosis Date    [facility] 2012    Abnormal Pap smear of cervix     Acute anemia     unknown bleed    Arthritis     Dyslipidemia 2013    GERD (gastroesophageal reflux disease)     Heart failure ([facility]     a fib    History of blood transfusion     History of pulmonary embolism     after hysterectomy    Hypercholesterolemia     Insomnia 2015    Menopause     CHERYL (obstructive sleep apnea) 6/15/2012    PAF (paroxysmal atrial fibrillation) ([facility] 6/15/2012    PE [facility]     age 46 years after hysterectomy    Persistent atrial fibrillation ([facility] 2014    Typical atrial flutter (HCC)        Past Surgical History:        Procedure Laterality Date    ABLATION OF DYSRHYTHMIC FOCUS  2014    Heart Ablation    CATARACT EXTRACTION       SECTION      x2    CHOLECYSTECTOMY      COLONOSCOPY      CYST REMOVAL      HYSTERECTOMY (CERVIX STATUS UNKNOWN)      ORTHOPEDIC SURGERY      lt ganglion cyst removal    OTHER SURGICAL HISTORY      laser surgery for varicose veins    TONSILLECTOMY         Social History:    Social History     Tobacco Use    Smoking status: Never    Smokeless tobacco: Never   Substance Use Topics    Alcohol use:  No                                Counseling given: Not Answered      Vital Signs (Current):   Vitals:    22 0907 22 0642   BP:  (!) 152/72   Pulse:  (!) 104   Resp:  18   Temp:  98.5 °F (36.9 °C)   TempSrc:  Oral   SpO2:  100%   Weight: 154 lb (69.9 kg) 154 lb (69.9 kg)   Height: 5' 6\" (1.676 m)                                               BP Readings from Last 3 Encounters:   22 (!) 152/72   22 120/60   22 124/82 NPO Status: Time of last liquid consumption: 2359                        Time of last solid consumption: 2359                        Date of last liquid consumption: 07/25/22                        Date of last solid food consumption: 07/25/22    BMI:   Wt Readings from Last 3 Encounters:   07/26/22 154 lb (69.9 kg)   03/04/22 161 lb (73 kg)   09/03/21 155 lb (70.3 kg)     Body mass index is 24.86 kg/m². CBC:   Lab Results   Component Value Date/Time    WBC 5.6 03/04/2022 02:39 PM    RBC 3.86 03/04/2022 02:39 PM    HGB 12.8 03/04/2022 02:39 PM    HCT 37.2 03/04/2022 02:39 PM    MCV 96 03/04/2022 02:39 PM    RDW 11.9 03/04/2022 02:39 PM     03/04/2022 02:39 PM       CMP:   Lab Results   Component Value Date/Time     03/04/2022 02:39 PM    K 4.7 03/04/2022 02:39 PM    CL 99 03/04/2022 02:39 PM    CO2 20 03/04/2022 02:39 PM    BUN 15 03/04/2022 02:39 PM    CREATININE 0.71 03/04/2022 02:39 PM    GFRAA 90 09/03/2021 10:37 AM    AGRATIO 1.7 03/04/2022 02:39 PM    GLUCOSE 84 03/04/2022 02:39 PM    PROT 6.4 03/04/2022 02:39 PM    CALCIUM 9.2 03/04/2022 02:39 PM    BILITOT 0.3 03/04/2022 02:39 PM    ALKPHOS 59 03/04/2022 02:39 PM    AST 21 03/04/2022 02:39 PM    ALT 16 03/04/2022 02:39 PM       POC Tests: No results for input(s): POCGLU, POCNA, POCK, POCCL, POCBUN, POCHEMO, POCHCT in the last 72 hours.     Coags: No results found for: PROTIME, INR, APTT    HCG (If Applicable): No results found for: PREGTESTUR, PREGSERUM, HCG, HCGQUANT     ABGs: No results found for: PHART, PO2ART, GFG0PNZ, SIG6ONK, BEART, J4FJDZFM     Type & Screen (If Applicable):  No results found for: LABABO, LABRH    Drug/Infectious Status (If Applicable):  No results found for: HIV, HEPCAB    COVID-19 Screening (If Applicable): No results found for: COVID19        Anesthesia Evaluation  Patient summary reviewed  Airway: Mallampati: III  TM distance: <3 FB   Neck ROM: full  Mouth opening: > = 3 FB and < 3 FB   Dental: normal exam

## 2022-07-26 NOTE — ANESTHESIA POSTPROCEDURE EVALUATION
Department of Anesthesiology  Postprocedure Note    Patient: Acosta Mesa  MRN: [id number]  YOB: 1945  Date of evaluation: 7/26/2022      Procedure Summary     Date: 07/26/22 Room / Location: [facility] OP OR 06 / [facility]    Anesthesia Start: 8779 Anesthesia Stop: 0914    Procedures:       right second, third, fourth PIP(proximal interphalangeal) resection arthroplasties   28285x3 (Right: Toes)      right second, third MTP (metatarsophalangeal)  Weil osteotomies    (Right: Toes) Diagnosis:       Acquired claw toe, right      Foot pain, right      (Acquired claw toe, right [M20.5X1])      (Foot pain, right [L51.767])    Surgeons: Kobe Toscano MD Responsible Provider: Mercy Xie MD    Anesthesia Type: TIVA ASA Status: 2          Anesthesia Type: No value filed.     Jayjay Phase I: Jayjay Score: 8    Jayjay Phase II:        Anesthesia Post Evaluation    Patient location during evaluation: PACU  Patient participation: complete - patient participated  Level of consciousness: awake and alert  Airway patency: patent  Nausea & Vomiting: no nausea and no vomiting  Complications: no  Cardiovascular status: hemodynamically stable  Respiratory status: acceptable, nonlabored ventilation and spontaneous ventilation  Hydration status: euvolemic  Comments: BP (!) 117/56   Pulse 62   Temp 98 °F (36.7 °C) (Temporal)   Resp 16   Ht 5' 6\" (1.676 m)   Wt 154 lb (69.9 kg)   SpO2 100%   BMI 24.86 kg/m²     Multimodal analgesia pain management approach

## 2022-07-26 NOTE — ANESTHESIA PROCEDURE NOTES
Peripheral Block    Patient location during procedure: pre-op  Reason for block: post-op pain management and at surgeon's request  Start time: 7/26/2022 7:48 AM  End time: 7/26/2022 7:52 AM  Staffing  Performed: anesthesiologist   Anesthesiologist: Subha Hayes MD  Preanesthetic Checklist  Completed: patient identified, IV checked, site marked, risks and benefits discussed, surgical/procedural consents, equipment checked, pre-op evaluation, timeout performed, anesthesia consent given, oxygen available and monitors applied/VS acknowledged  Peripheral Block   Patient position: supine  Prep: ChloraPrep  Provider prep: mask and sterile gloves  Patient monitoring: cardiac monitor, continuous pulse ox, frequent blood pressure checks, IV access, oxygen and responsive to questions  Block type: Ankle  Laterality: right  Injection technique: single-shot  Guidance: other and Anatomic Landmarks Used    Needle   Needle type: Other   Needle gauge: 25 gauge 1.5 inch needle. Needle localization: anatomical landmarks  Assessment   Injection assessment: negative aspiration for heme, no paresthesia on injection and no intravascular symptoms  Slow fractionated injection: yes  Hemodynamics: stable  Outcomes: patient tolerated procedure well    Additional Notes  Risks/benefits/alternatives discussed including damage to nerve or muscle. Needle inserted and placed in close proximity to each of the 5 nerves of the foot for ankle block based on anatomic landmarks. 6cc of local anesthetic injected close to each nerve.   Medications Administered  ropivacaine (NAROPIN) injection 0.5% - Perineural   30 mL - 7/26/2022 7:52:00 AM  mepivacaine 1.5 % - Perineural   10 mL - 7/26/2022 7:52:00 AM

## 2022-07-26 NOTE — OP NOTE
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 Item Serial No.  Lot No. LRB No. Used Action   SCREW BNE L11MM DIA2MM MORGAN TI [address] ST SELF DRL [facility] - [id number]  SCREW BNE L11MM DIA2MM MORGAN TI [address]Y ST SELF DRL [facility] [employer]- [id number] Right 2 Implanted   WIRE ORTH 1.1MM MARILEE 229MM SMOOTH DBL BAYNT Cassie Frater - [id number]  WIRE ORTH 1.1MM MARILEE 229MM SMOOTH DBL BAYNT Cassie Frater  [employer]- [id number] Right 1 Implanted   WIRE ORTH 1.1MM MARILEE 229MM SMOOTH DBL BAYNT Cassie Frater - [id number]  WIRE ORTH 1.1MM MARILEE 229MM SMOOTH DBL BAYNT Cassie Frater  [employer]- [id number] Right 1 Implanted   WIRE ORTH 1.1MM MARILEE 229MM SMOOTH DBL BAYNT TIP Kimberly Pineda - [id number]  WIRE ORTH 1.1MM MARILEE 229MM SMOOTH DBL BAYNT TIP Elma Cheng [employer]- [id number] Right 1 Implanted       Anesthesia:  Monitor Anesthesia Care    Blood Loss:  minimal    Tourniquet:  Estimated calf 26 minutes    Pre Operative Abx:   Ancef 2g            Pre Operative Course:  Sara Arevalo is a 69 y.o. female who has a history of right fixed claw toe deformities. Operation In Detail:  Patient was evaluated in the preoperative area. We had a long discussion about the procedure and postoperative protocols. The patient was then brought back to the operating room suite and placed in the operating room table. A timeout was taken to identify the patient, procedure being performed, and laterality.   After this the patient was prepped and draped in the normal sterile fashion using a Betadine solution and/or a ChloraPrep solution. A timeout was then taken to identify the patient his name, date of birth, laterality, and procedure being performed. We also identified allergies and any concerns about the operation. Attention was then placed to the operative extremity. An ankle block was placed by the department of anesthesia. During a preop surgical timeout the right lower extremity was identified as a correct surgical site and prepped and draped in a standard sterile fashion using ChloraPrep solution. PIP resection arthroplasties of the second third and fourth toes were performed to elliptical incisions. A separate approach to the second and third metatarsal phalangeal joints was performed with extensor tendon lengthenings and capsulotomies. Extensor tendon lengthenings were later repaired using Monocryl sutures. Weil osteotomies of the distal second and third metatarsals were performed with the capital fragment shifted approximately 2 mm proximally and secured using twist off screws. These toes were then pinned using a K wire in a retrograde fashion. The third toe was also pinned using a K wire in retrograde fashion. A separate approach was made to the fourth metatarsal phalangeal joint with Monocryl used to correct the angular soft tissue deformity in this joint. The wounds were then irrigated and closed using Monocryl nylon sutures. A sterile dressing was then applied. Anesthesia was discontinued. The patient was transferred back to recovery bed. She was taken to recovery in satisfactory condition. She appeared to tolerate the procedure well. There were no apparent surgical or anesthetic complications. All needle and sponge counts were correct. A sterile dressing was then applied to the leg and Hardole/Post op sandal.  They were awoken from anesthesia and returned to the PACU without difficulty.     Post Operative Plan:   1- WB status: As tolerated   2- Immobilization/assistive devices: walker  3- DVT px: No VTE Prophylaxis Needed

## 2022-08-02 RX ORDER — METOPROLOL SUCCINATE 50 MG/1
50 TABLET, EXTENDED RELEASE ORAL 2 TIMES DAILY
Qty: 180 TABLET | Refills: 3 | Status: SHIPPED | OUTPATIENT
Start: 2022-08-02

## 2022-08-02 NOTE — TELEPHONE ENCOUNTER
Per mychart encounter, pt is requesting refills of metoprolol 50 mg and [facility] Per last office note, pt is currently taking both medications. Routing to dr. Clara Yancey to sign.

## 2022-08-03 ENCOUNTER — TELEPHONE (OUTPATIENT)
Dept: ORTHOPEDIC SURGERY | Age: 77
End: 2022-08-03

## 2022-08-03 ENCOUNTER — OFFICE VISIT (OUTPATIENT)
Dept: ORTHOPEDIC SURGERY | Age: 77
End: 2022-08-03
Payer: MEDICARE

## 2022-08-03 DIAGNOSIS — M79.671 RIGHT FOOT PAIN: Primary | ICD-10-CM

## 2022-08-03 PROCEDURE — 99212 OFFICE O/P EST SF 10 MIN: CPT | Performed by: NURSE PRACTITIONER

## 2022-08-03 PROCEDURE — 1090F PRES/ABSN URINE INCON ASSESS: CPT | Performed by: NURSE PRACTITIONER

## 2022-08-03 PROCEDURE — G8399 PT W/DXA RESULTS DOCUMENT: HCPCS | Performed by: NURSE PRACTITIONER

## 2022-08-03 PROCEDURE — 1036F TOBACCO NON-USER: CPT | Performed by: NURSE PRACTITIONER

## 2022-08-03 PROCEDURE — 1123F ACP DISCUSS/DSCN MKR DOCD: CPT | Performed by: NURSE PRACTITIONER

## 2022-08-03 PROCEDURE — G8428 CUR MEDS NOT DOCUMENT: HCPCS | Performed by: NURSE PRACTITIONER

## 2022-08-03 PROCEDURE — G8417 CALC BMI ABV UP PARAM F/U: HCPCS | Performed by: NURSE PRACTITIONER

## 2022-08-03 NOTE — TELEPHONE ENCOUNTER
She had surgery on her toes last Tuesday. Her bandage got wet and she needs to know what to do. Please call.

## 2022-08-03 NOTE — PROGRESS NOTES
Patient called she states she had gotten her dressing wet in the shower.      She had surgery in 7/26/22   Right 2nd, 3rd , 4th PIP joint arthroplasties , weil osteoeotomies and Right Fourth MTP Capsulotomy with angular soft tisse correction     Dressing removed today  Sutures intact, no signs of infection   Applied new dressing 3rd toe pin trimmed today   Follow up as currently scheduled

## 2022-08-08 ENCOUNTER — OFFICE VISIT (OUTPATIENT)
Dept: ORTHOPEDIC SURGERY | Age: 77
End: 2022-08-08

## 2022-08-08 DIAGNOSIS — M20.5X1 ACQUIRED CLAW TOE OF RIGHT FOOT: Primary | ICD-10-CM

## 2022-08-08 PROCEDURE — 99024 POSTOP FOLLOW-UP VISIT: CPT | Performed by: ORTHOPAEDIC SURGERY

## 2022-08-08 NOTE — PROGRESS NOTES
Name: Cristal Stevens  YOB: 1945  Gender: female  MRN: [id number]    Procedure Performed:right second, third, fourth PIP(proximal interphalangeal) resection arthroplasties   28285x3 - Right and right second, third MTP (metatarsophalangeal)  Weil osteotomies    - Right        Date of Procedure: 7/26/2022      Subjective: Doing well. She did come for dressing change after getting the operative dressing wet. Physical Examination: Sutures removed today. All incisions are healing well without sign of infection. Some of the toe pins have migrated out and were trimmed today and shortened. Imaging:   No imaging reviewed             Eric Ley III, MD           Assessment:   Doing well status post claw toe corrections      Plan:   3 This is stable chronic illness/condition  Treatment at this time:  Studies ordered: NO XR needed @ Next Visit    Weight-bearing status: WBAT        Return to work/work restrictions: none  none      She will return for repeat out an x-ray in 2 weeks.

## 2022-08-18 ENCOUNTER — TELEPHONE (OUTPATIENT)
Dept: ORTHOPEDIC SURGERY | Age: 77
End: 2022-08-18

## 2022-08-18 DIAGNOSIS — M20.5X1 ACQUIRED CLAW TOE OF RIGHT FOOT: Primary | ICD-10-CM

## 2022-08-18 RX ORDER — CIPROFLOXACIN 500 MG/1
500 TABLET, FILM COATED ORAL 2 TIMES DAILY
Qty: 14 TABLET | Refills: 0 | Status: SHIPPED | OUTPATIENT
Start: 2022-08-18 | End: 2022-08-25

## 2022-08-18 NOTE — TELEPHONE ENCOUNTER
Patient states she is unable to send a picture thru [address] Patient states toes are red and swollen. Patient has an appt on Monday to see Aubree Watson NP for pin removal and x-rays.

## 2022-08-18 NOTE — TELEPHONE ENCOUNTER
She had surgery and now her foot is red and swollen looking. I told her to try to send a picture in my chart while she's waiting on a call back. Please call.

## 2022-08-19 ENCOUNTER — TELEPHONE (OUTPATIENT)
Dept: ORTHOPEDIC SURGERY | Age: 77
End: 2022-08-19

## 2022-08-19 NOTE — TELEPHONE ENCOUNTER
08-18-22 Patient came in today and had 2 pins removed from her right foot. Patient's foot was slightly swollen with minimal redness. Both k-wires were removed successfully and patient will keep her scheduled appointment with Indra Rico on Monday.

## 2022-08-22 ENCOUNTER — OFFICE VISIT (OUTPATIENT)
Dept: ORTHOPEDIC SURGERY | Age: 77
End: 2022-08-22

## 2022-08-22 DIAGNOSIS — M20.5X1 ACQUIRED CLAW TOE OF RIGHT FOOT: Primary | ICD-10-CM

## 2022-08-22 PROCEDURE — 99024 POSTOP FOLLOW-UP VISIT: CPT | Performed by: NURSE PRACTITIONER

## 2022-08-22 NOTE — PROGRESS NOTES
may begin to wean from postop shoe and back into regular shoes as she feels she can tolerate and swelling allows. She was encouraged to continue elevating the affected extremity as needed for swelling. She may now soak the affected extremity with Epsom salts was encouraged to do so to help with additional incisional healing and swelling . She will start a diligent at home exercise program as well as physical therapy, an order was given for this today as well as a list of locations for her to choose from. The only restrictions she has on resume physical activity this time is no running and no jumping. She will follow-up in 6 weeks or sooner if needed.   Studies ordered: NO XR needed @ Next Visit    Weight-bearing status: WBAT        Return to work/work restrictions: none  No medications given

## 2022-09-09 ENCOUNTER — OFFICE VISIT (OUTPATIENT)
Dept: INTERNAL MEDICINE CLINIC | Facility: CLINIC | Age: 77
End: 2022-09-09
Payer: MEDICARE

## 2022-09-09 VITALS
WEIGHT: 161 LBS | BODY MASS INDEX: 27.49 KG/M2 | SYSTOLIC BLOOD PRESSURE: 112 MMHG | HEIGHT: 64 IN | DIASTOLIC BLOOD PRESSURE: 72 MMHG

## 2022-09-09 DIAGNOSIS — E78.5 DYSLIPIDEMIA: ICD-10-CM

## 2022-09-09 DIAGNOSIS — G60.9 IDIOPATHIC PERIPHERAL NEUROPATHY: ICD-10-CM

## 2022-09-09 DIAGNOSIS — K21.9 GASTROESOPHAGEAL REFLUX DISEASE WITHOUT ESOPHAGITIS: ICD-10-CM

## 2022-09-09 DIAGNOSIS — I48.19 PERSISTENT ATRIAL FIBRILLATION (HCC): ICD-10-CM

## 2022-09-09 DIAGNOSIS — Z78.0 ASYMPTOMATIC MENOPAUSAL STATE: ICD-10-CM

## 2022-09-09 DIAGNOSIS — F32.A MILD DEPRESSION: ICD-10-CM

## 2022-09-09 DIAGNOSIS — R51.9 NONINTRACTABLE EPISODIC HEADACHE, UNSPECIFIED HEADACHE TYPE: ICD-10-CM

## 2022-09-09 DIAGNOSIS — M85.80 OSTEOPENIA, UNSPECIFIED LOCATION: ICD-10-CM

## 2022-09-09 DIAGNOSIS — Z00.00 MEDICARE ANNUAL WELLNESS VISIT, SUBSEQUENT: Primary | ICD-10-CM

## 2022-09-09 LAB
ALBUMIN SERPL-MCNC: 3.8 G/DL (ref 3.2–4.6)
ALBUMIN/GLOB SERPL: 1.1 {RATIO} (ref 1.2–3.5)
ALP SERPL-CCNC: 64 U/L (ref 50–136)
ALT SERPL-CCNC: 24 U/L (ref 12–65)
ANION GAP SERPL CALC-SCNC: 8 MMOL/L (ref 4–13)
AST SERPL-CCNC: 19 U/L (ref 15–37)
BILIRUB SERPL-MCNC: 0.4 MG/DL (ref 0.2–1.1)
BUN SERPL-MCNC: 15 MG/DL (ref 8–23)
CALCIUM SERPL-MCNC: 9.2 MG/DL (ref 8.3–10.4)
CHLORIDE SERPL-SCNC: 104 MMOL/L (ref 101–110)
CHOLEST SERPL-MCNC: 215 MG/DL
CO2 SERPL-SCNC: 26 MMOL/L (ref 21–32)
CREAT SERPL-MCNC: 0.7 MG/DL (ref 0.6–1)
GLOBULIN SER CALC-MCNC: 3.4 G/DL (ref 2.3–3.5)
GLUCOSE SERPL-MCNC: 101 MG/DL (ref 65–100)
HDLC SERPL-MCNC: 47 MG/DL (ref 40–60)
HDLC SERPL: 4.6 {RATIO}
LDLC SERPL CALC-MCNC: 145.8 MG/DL
POTASSIUM SERPL-SCNC: 4.4 MMOL/L (ref 3.5–5.1)
PROT SERPL-MCNC: 7.2 G/DL (ref 6.3–8.2)
SODIUM SERPL-SCNC: 138 MMOL/L (ref 136–145)
TRIGL SERPL-MCNC: 111 MG/DL (ref 35–150)
TSH W FREE THYROID IF ABNORMAL: 1.84 UIU/ML (ref 0.36–3.74)
VIT B12 SERPL-MCNC: 525 PG/ML (ref 193–986)
VLDLC SERPL CALC-MCNC: 22.2 MG/DL (ref 6–23)

## 2022-09-09 PROCEDURE — 1036F TOBACCO NON-USER: CPT | Performed by: INTERNAL MEDICINE

## 2022-09-09 PROCEDURE — G8427 DOCREV CUR MEDS BY ELIG CLIN: HCPCS | Performed by: INTERNAL MEDICINE

## 2022-09-09 PROCEDURE — G8399 PT W/DXA RESULTS DOCUMENT: HCPCS | Performed by: INTERNAL MEDICINE

## 2022-09-09 PROCEDURE — G8417 CALC BMI ABV UP PARAM F/U: HCPCS | Performed by: INTERNAL MEDICINE

## 2022-09-09 PROCEDURE — 1123F ACP DISCUSS/DSCN MKR DOCD: CPT | Performed by: INTERNAL MEDICINE

## 2022-09-09 PROCEDURE — 99213 OFFICE O/P EST LOW 20 MIN: CPT | Performed by: INTERNAL MEDICINE

## 2022-09-09 PROCEDURE — G0439 PPPS, SUBSEQ VISIT: HCPCS | Performed by: INTERNAL MEDICINE

## 2022-09-09 PROCEDURE — 1090F PRES/ABSN URINE INCON ASSESS: CPT | Performed by: INTERNAL MEDICINE

## 2022-09-09 RX ORDER — GABAPENTIN 100 MG/1
100 CAPSULE ORAL 3 TIMES DAILY
Qty: 270 CAPSULE | Refills: 3 | Status: SHIPPED | OUTPATIENT
Start: 2022-09-09 | End: 2023-03-08

## 2022-09-09 RX ORDER — SERTRALINE HYDROCHLORIDE 100 MG/1
100 TABLET, FILM COATED ORAL NIGHTLY
Qty: 90 TABLET | Refills: 3 | Status: SHIPPED | OUTPATIENT
Start: 2022-09-09

## 2022-09-09 RX ORDER — ALENDRONATE SODIUM 70 MG/1
70 TABLET ORAL
Qty: 12 TABLET | Refills: 3 | Status: SHIPPED | OUTPATIENT
Start: 2022-09-09

## 2022-09-09 RX ORDER — CYCLOBENZAPRINE HCL 10 MG
10 TABLET ORAL NIGHTLY
Qty: 90 TABLET | Refills: 3 | Status: SHIPPED | OUTPATIENT
Start: 2022-09-09

## 2022-09-09 RX ORDER — ESOMEPRAZOLE MAGNESIUM 40 MG/1
40 CAPSULE, DELAYED RELEASE ORAL
Qty: 90 CAPSULE | Refills: 3 | Status: SHIPPED | OUTPATIENT
Start: 2022-09-09

## 2022-09-09 ASSESSMENT — ENCOUNTER SYMPTOMS
SHORTNESS OF BREATH: 0
COUGH: 0

## 2022-09-09 ASSESSMENT — PATIENT HEALTH QUESTIONNAIRE - PHQ9
SUM OF ALL RESPONSES TO PHQ QUESTIONS 1-9: 3
SUM OF ALL RESPONSES TO PHQ9 QUESTIONS 1 & 2: 0
1. LITTLE INTEREST OR PLEASURE IN DOING THINGS: 0
8. MOVING OR SPEAKING SO SLOWLY THAT OTHER PEOPLE COULD HAVE NOTICED. OR THE OPPOSITE, BEING SO FIGETY OR RESTLESS THAT YOU HAVE BEEN MOVING AROUND A LOT MORE THAN USUAL: 0
7. TROUBLE CONCENTRATING ON THINGS, SUCH AS READING THE NEWSPAPER OR WATCHING TELEVISION: 0
3. TROUBLE FALLING OR STAYING ASLEEP: 2
10. IF YOU CHECKED OFF ANY PROBLEMS, HOW DIFFICULT HAVE THESE PROBLEMS MADE IT FOR YOU TO DO YOUR WORK, TAKE CARE OF THINGS AT HOME, OR GET ALONG WITH OTHER PEOPLE: 0
4. FEELING TIRED OR HAVING LITTLE ENERGY: 1
9. THOUGHTS THAT YOU WOULD BE BETTER OFF DEAD, OR OF HURTING YOURSELF: 0
SUM OF ALL RESPONSES TO PHQ QUESTIONS 1-9: 3
SUM OF ALL RESPONSES TO PHQ QUESTIONS 1-9: 3
2. FEELING DOWN, DEPRESSED OR HOPELESS: 0
6. FEELING BAD ABOUT YOURSELF - OR THAT YOU ARE A FAILURE OR HAVE LET YOURSELF OR YOUR FAMILY DOWN: 0
SUM OF ALL RESPONSES TO PHQ QUESTIONS 1-9: 3
5. POOR APPETITE OR OVEREATING: 0

## 2022-09-09 ASSESSMENT — LIFESTYLE VARIABLES: HOW MANY STANDARD DRINKS CONTAINING ALCOHOL DO YOU HAVE ON A TYPICAL DAY: PATIENT DOES NOT DRINK

## 2022-09-09 NOTE — PROGRESS NOTES
Medicare Annual Wellness Visit    [place] is here for Annual Exam (AWC-fasting)    Assessment & Plan   Medicare annual wellness visit, subsequent  Persistent atrial fibrillation (HCC)  Idiopathic peripheral neuropathy  Mild depression (HCC)  -     sertraline (ZOLOFT) 100 MG tablet; Take 1 tablet by mouth at bedtime, Disp-90 tablet, R-3Normal  Dyslipidemia  -     CBC with Auto Differential; Future  -     Comprehensive Metabolic Panel; Future  -     Lipid Panel; Future  Osteopenia, unspecified location  -     alendronate (FOSAMAX) 70 MG tablet; Take 1 tablet by mouth every 7 days, Disp-12 tablet, R-3Normal  Gastroesophageal reflux disease without esophagitis  -     esomeprazole (NEXIUM) 40 MG delayed release capsule; Take 1 capsule by mouth every morning (before breakfast), Disp-90 capsule, R-3Normal  Nonintractable episodic headache, unspecified headache type  -     cyclobenzaprine (FLEXERIL) 10 MG tablet; Take 1 tablet by mouth nightly Takes 1 HS, Disp-90 tablet, R-3Normal  Asymptomatic menopausal state    Recommendations for Preventive Services Due: see orders and patient instructions/AVS.  Recommended screening schedule for the next 5-10 years is provided to the patient in written form: see Patient Instructions/AVS.   Get dexa , may be able to stop fosamax; colon was 2018-none furtehr; mammo done 5/2022-had pneumovax and prevnar  Return for Medicare Annual Wellness Visit in 1 year. Subjective       Patient's complete Health Risk Assessment and screening values have been reviewed and are found in Flowsheets. The following problems were reviewed today and where indicated follow up appointments were made and/or referrals ordered.     Positive Risk Factor Screenings with Interventions:    Fall Risk:  Do you feel unsteady or are you worried about falling? : no  2 or more falls in past year?: (!) yes  Fall with injury in past year?: no   Fall Risk Interventions:    Patient declines any further evaluation/treatment for this issue            General Health and ACP:  General  In general, how would you say your health is?: Very Good  In the past 7 days, have you experienced any of the following: New or Increased Pain, New or Increased Fatigue, Loneliness, Social Isolation, Stress or Anger?: (!) Yes  Select all that apply: (!) New or Increased Pain  Do you get the social and emotional support that you need?: Yes  Do you have a Living Will?: Yes    Advance Directives       Power of  Living Will ACP-Advance Directive ACP-Power of     Not on File Not on File Not on File Not on File        General Health Risk Interventions: Had toe surgery with some pain -doing better              Objective   Vitals:    09/09/22 1005   BP: 112/72   Weight: 161 lb (73 kg)   Height: 5' 4\" (1.626 m)      Body mass index is 27.64 kg/m². General Appearance: alert and oriented to person, place and time, well-developed and well-nourished, in no acute distress  Pulmonary/Chest: clear to auscultation bilaterally- no wheezes, rales or rhonchi, normal air movement, no respiratory distress  Cardiovascular: normal rate, normal S1 and S2, no gallops, intact distal pulses, and no carotid bruits       Allergies   Allergen Reactions    Codeine Hives     Also caused hallucinations. Also caused hallucinations. Also caused hallucinations. Pitavastatin Other (See Comments)     Took zocor, lipitor and muscle symptoms  Other reaction(s): Unknown-Unspecified  Took zocor, lipitor and muscle symptoms  Other reaction(s): Myalgia  Took zocor, lipitor and muscle symptoms  Other reaction(s): Myalgia  Took zocor, lipitor and muscle symptoms       Prior to Visit Medications    Medication Sig Taking?  Authorizing Provider   esomeprazole (NEXIUM) 40 MG delayed release capsule Take 1 capsule by mouth every morning (before breakfast) Yes Eloisa Parisi MD   alendronate (FOSAMAX) 70 MG tablet Take 1 tablet by mouth every 7 days Yes LIOR Yadira Sauer MD   sertraline (ZOLOFT) 100 MG tablet Take 1 tablet by mouth at bedtime Yes Minoo Peter MD   cyclobenzaprine (FLEXERIL) 10 MG tablet Take 1 tablet by mouth nightly Takes 1 HS Yes Minoo Peter MD   apixaban (ELIQUIS) 5 MG TABS tablet Take 1 tablet by mouth in the morning and 1 tablet before bedtime. TAKE ONE TABLET BY MOUTH TWICE A DAY. Yes Martha Grubbs MD   metoprolol succinate (TOPROL XL) 50 MG extended release tablet Take 1 tablet by mouth in the morning and 1 tablet before bedtime.  Yes Martha Grubbs MD   vitamin D 25 MCG (1000 UT) CAPS Take by mouth daily Yes Historical Provider, MD   clobetasol (TEMOVATE) 0.05 % cream Apply topically 2 times daily Yes Ar Automatic Reconciliation   magnesium oxide (MAG-OX) 400 MG tablet Take 400 mg by mouth 2 times daily as needed Yes Ar Automatic Reconciliation   SUMAtriptan (IMITREX) 50 MG tablet Take 50 mg by mouth once as needed Yes Ar Automatic Reconciliation       CareTeam (Including outside providers/suppliers regularly involved in providing care):   Patient Care Team:  Minoo Peter MD as PCP - Heather Rojas MD as PCP - Dillon Junior Provider  Dr Mason-cardiology; Dr Mike Burgos   Reviewed and updated this visit:  Allergies  Meds  Problems  Med Hx  Surg Hx  Soc Hx  Fam Hx

## 2022-09-09 NOTE — PROGRESS NOTES
SUBJECTIVE:   Talia Zuniga is a 69 y.o. female seen for a visit regarding   Chief Complaint   Patient presents with    Annual Exam     AWC-fasting        Neurologic Problem  Primary symptoms comment: hx neuropathy dx by [facility] years ago-was mild then --burning distal-more day than night; considered idiopathic in past-no med has been used. This is a chronic problem. The neurological problem developed gradually. The problem is unchanged. There was upper extremity and lower extremity focality noted. Pertinent negatives include no shortness of breath. Past Medical History, Past Surgical History, Family history, Social History, and Medications were all reviewed with the patient today and updated as necessary. Current Outpatient Medications   Medication Sig Dispense Refill    esomeprazole (NEXIUM) 40 MG delayed release capsule Take 1 capsule by mouth every morning (before breakfast) 90 capsule 3    alendronate (FOSAMAX) 70 MG tablet Take 1 tablet by mouth every 7 days 12 tablet 3    sertraline (ZOLOFT) 100 MG tablet Take 1 tablet by mouth at bedtime 90 tablet 3    cyclobenzaprine (FLEXERIL) 10 MG tablet Take 1 tablet by mouth nightly Takes 1 HS 90 tablet 3    gabapentin (NEURONTIN) 100 MG capsule Take 1 capsule by mouth 3 times daily for 180 days. Intended supply: 30 days 270 capsule 3    apixaban (ELIQUIS) 5 MG TABS tablet Take 1 tablet by mouth in the morning and 1 tablet before bedtime. TAKE ONE TABLET BY MOUTH TWICE A DAY. 180 tablet 3    metoprolol succinate (TOPROL XL) 50 MG extended release tablet Take 1 tablet by mouth in the morning and 1 tablet before bedtime.  180 tablet 3    vitamin D 25 MCG (1000 UT) CAPS Take by mouth daily      clobetasol (TEMOVATE) 0.05 % cream Apply topically 2 times daily      magnesium oxide (MAG-OX) 400 MG tablet Take 400 mg by mouth 2 times daily as needed      SUMAtriptan (IMITREX) 50 MG tablet Take 50 mg by mouth once as needed       No current facility-administered medications for this visit. Allergies   Allergen Reactions    Codeine Hives     Also caused hallucinations. Also caused hallucinations. Also caused hallucinations. Pitavastatin Other (See Comments)     Took zocor, lipitor and muscle symptoms  Other reaction(s): Unknown-Unspecified  Took zocor, lipitor and muscle symptoms  Other reaction(s): Myalgia  Took zocor, lipitor and muscle symptoms  Other reaction(s): Myalgia  Took zocor, lipitor and muscle symptoms       Patient Active Problem List   Diagnosis    A-fib ([address]    Dyslipidemia    Insomnia    Chronic sinusitis    Vitamin D deficiency    Persistent atrial fibrillation (HCC)    CHERYL (obstructive sleep apnea)    Non-allergic rhinitis    Chronic tension-type headache, not intractable    Osteopenia    Mild depression (HCC)    Idiopathic peripheral neuropathy    Health care maintenance    Hypergammaglobulinemia    Acquired claw toe, right    Foot pain, right     Social History     Tobacco Use    Smoking status: Never    Smokeless tobacco: Never   Substance Use Topics    Alcohol use: No          Review of Systems   Constitutional:  Negative for unexpected weight change. Respiratory:  Negative for cough and shortness of breath. Neurological:  Positive for numbness (burning). OBJECTIVE:  /72   Ht 5' 4\" (1.626 m)   Wt 161 lb (73 kg)   BMI 27.64 kg/m²      Physical Exam  Constitutional:       General: She is not in acute distress. Appearance: Normal appearance. Cardiovascular:      Rate and Rhythm: Normal rate and regular rhythm. Heart sounds: No murmur heard. Pulmonary:      Breath sounds: No wheezing or rhonchi. Neurological:      Sensory: No sensory deficit. Deep Tendon Reflexes: Reflexes normal.      Comments: Ankle jerk intact          Medical problems and test results were reviewed with the patient today. ASSESSMENT and PLAN     1. Medicare annual wellness visit, subsequent  2.  Persistent atrial fibrillation ([facility] [address]  3. Idiopathic peripheral neuropathy  -     gabapentin (NEURONTIN) 100 MG capsule; Take 1 capsule by mouth 3 times daily for 180 days. Intended supply: 30 days, Disp-270 capsule, R-3Normal  -     Vitamin B12; Future  -     TSH with Reflex; Future  4. Mild depression (HCC)  -     sertraline (ZOLOFT) 100 MG tablet; Take 1 tablet by mouth at bedtime, Disp-90 tablet, R-3Normal  5. Dyslipidemia  -     CBC with Auto Differential; Future  -     Comprehensive Metabolic Panel; Future  -     Lipid Panel; Future  -     TSH with Reflex; Future  6. Osteopenia, unspecified location  -     alendronate (FOSAMAX) 70 MG tablet; Take 1 tablet by mouth every 7 days, Disp-12 tablet, R-3Normal  7. Gastroesophageal reflux disease without esophagitis  -     esomeprazole (NEXIUM) 40 MG delayed release capsule; Take 1 capsule by mouth every morning (before breakfast), Disp-90 capsule, R-3Normal  8. Nonintractable episodic headache, unspecified headache type  -     cyclobenzaprine (FLEXERIL) 10 MG tablet; Take 1 tablet by mouth nightly Takes 1 HS, Disp-90 tablet, R-3Normal  9. Asymptomatic menopausal state  -     DEXA BONE DENSITY AXIAL SKELETON; Future   Neuroapthy slowly more sx -discussed katey -start 100mg tid by 1 every 3d --to 2 tid then and then 3 tid ro see if helps-headache as migraine infrequent-on fosamax 4 plus yr-may stop it soon if dexa ok-mood fine on zoloft so continue; ok for flexeril nightly that prevents her headache-reflux stable on ppi also-cardiology follows a fib  lab results and schedule for future lab studies reviewed with patient  reviewed medications and side effects in detail     Return in 6 months (on 3/9/2023) for Medicare Annual Wellness Visit in 1 year, For medication assessment.

## 2022-09-09 NOTE — PATIENT INSTRUCTIONS
Personalized Preventive Plan for Brady Overcast - 9/9/2022  Medicare offers a range of preventive health benefits. Some of the tests and screenings are paid in full while other may be subject to a deductible, co-insurance, and/or copay. Some of these benefits include a comprehensive review of your medical history including lifestyle, illnesses that may run in your family, and various assessments and screenings as appropriate. After reviewing your medical record and screening and assessments performed today your provider may have ordered immunizations, labs, imaging, and/or referrals for you. A list of these orders (if applicable) as well as your Preventive Care list are included within your After Visit Summary for your review. Other Preventive Recommendations:    A preventive eye exam performed by an eye specialist is recommended every 1-2 years to screen for glaucoma; cataracts, macular degeneration, and other eye disorders. A preventive dental visit is recommended every 6 months. Try to get at least 150 minutes of exercise per week or 10,000 steps per day on a pedometer . Order or download the FREE \"Exercise & Physical Activity: Your Everyday Guide\" from [employer]. Call 3-622.812.4092 or search [employer] online. You need 5293-1482 mg of calcium and 0941-9033 IU of vitamin D per day. It is possible to meet your calcium requirement with diet alone, but a vitamin D supplement is usually necessary to meet this goal.  When exposed to the sun, use a sunscreen that protects against both UVA and UVB radiation with an SPF of 30 or greater. Reapply every 2 to 3 hours or after sweating, drying off with a towel, or swimming. Always wear a seat belt when traveling in a car. Always wear a helmet when riding a bicycle or motorcycle.

## 2022-09-10 LAB
BASOPHILS # BLD: 0 K/UL (ref 0–0.2)
BASOPHILS NFR BLD: 1 % (ref 0–2)
DIFFERENTIAL METHOD BLD: ABNORMAL
EOSINOPHIL # BLD: 0.1 K/UL (ref 0–0.8)
EOSINOPHIL NFR BLD: 2 % (ref 0.5–7.8)
ERYTHROCYTE [DISTWIDTH] IN BLOOD BY AUTOMATED COUNT: 13.2 % (ref 11.9–14.6)
HCT VFR BLD AUTO: 41 % (ref 35.8–46.3)
HGB BLD-MCNC: 13.2 G/DL (ref 11.7–15.4)
IMM GRANULOCYTES # BLD AUTO: 0 K/UL (ref 0–0.5)
IMM GRANULOCYTES NFR BLD AUTO: 0 % (ref 0–5)
LYMPHOCYTES # BLD: 1.4 K/UL (ref 0.5–4.6)
LYMPHOCYTES NFR BLD: 30 % (ref 13–44)
MCH RBC QN AUTO: 32.6 PG (ref 26.1–32.9)
MCHC RBC AUTO-ENTMCNC: 32.2 G/DL (ref 31.4–35)
MCV RBC AUTO: 101.2 FL (ref 79.6–97.8)
MONOCYTES # BLD: 0.4 K/UL (ref 0.1–1.3)
MONOCYTES NFR BLD: 10 % (ref 4–12)
NEUTS SEG # BLD: 2.6 K/UL (ref 1.7–8.2)
NEUTS SEG NFR BLD: 57 % (ref 43–78)
NRBC # BLD: 0 K/UL (ref 0–0.2)
PLATELET # BLD AUTO: 231 K/UL (ref 150–450)
PMV BLD AUTO: 9.7 FL (ref 9.4–12.3)
RBC # BLD AUTO: 4.05 M/UL (ref 4.05–5.2)
WBC # BLD AUTO: 4.5 K/UL (ref 4.3–11.1)

## 2022-09-27 ENCOUNTER — OFFICE VISIT (OUTPATIENT)
Dept: ORTHOPEDIC SURGERY | Age: 77
End: 2022-09-27
Payer: MEDICARE

## 2022-09-27 DIAGNOSIS — M17.0 PRIMARY OSTEOARTHRITIS OF BOTH KNEES: ICD-10-CM

## 2022-09-27 DIAGNOSIS — M17.0 OSTEOARTHRITIS OF BOTH KNEES, UNSPECIFIED OSTEOARTHRITIS TYPE: Primary | ICD-10-CM

## 2022-09-27 PROCEDURE — 20610 DRAIN/INJ JOINT/BURSA W/O US: CPT | Performed by: ORTHOPAEDIC SURGERY

## 2022-09-27 RX ORDER — METHYLPREDNISOLONE ACETATE 40 MG/ML
40 INJECTION, SUSPENSION INTRA-ARTICULAR; INTRALESIONAL; INTRAMUSCULAR; SOFT TISSUE ONCE
Status: SHIPPED | OUTPATIENT
Start: 2022-09-27

## 2022-09-27 NOTE — PROGRESS NOTES
Patient ID:  Devon Orozco  [id number]  62 y.o.  1945    Today: September 27, 2022          Chief Complaint:  Bilateral Knee pain    HPI:       Devon Orozco is a 69 y.o. female  that presents today for followup of bilateral knee pain. The patient has previously been evaluated and treated for this problem. The patient complains of knee pain with activities, reports stiffness of the knee with prolonged inactivity, and swelling/pain at the end of the day and after increased physical activity. Pain is described as a general ache with occasional sharp pain, primarily along the joint lines. The pain is rated as ranging from 3-8 with periods of acute exacerbation and periods with less severe pain. Generally, symptoms improve with sitting/rest. The pain affects the patients activities of daily living and quality of life. Patient reports progressive pain and instability in the knee. Patient has attempted prior conservative treatment including over-the-counter medications and activity modification. Patient has previously had an intra-articular steroid injection which at one point has provided 3+ months of pain relief.     Past Medical History:  Past Medical History:   Diagnosis Date    A-fib ([facility] [address] 7/24/2012    Abnormal Pap smear of cervix     Acute anemia     unknown bleed    Arthritis     Dyslipidemia 4/13/2013    GERD (gastroesophageal reflux disease)     Heart failure (HCC)     a fib    History of blood transfusion     History of pulmonary embolism     after hysterectomy    Hypercholesterolemia     Insomnia 11/17/2015    Menopause     CHERYL (obstructive sleep apnea) 6/15/2012    PAF (paroxysmal atrial fibrillation) ([address] 6/15/2012    PE (pulmonary thromboembolism) ([address]     age 46 years after hysterectomy    Persistent atrial fibrillation ([address] 5/19/2014    Typical atrial flutter (HCC)        Past Surgical History:  Past Surgical History:   Procedure Laterality Date    ABLATION OF DYSRHYTHMIC FOCUS 2014    Heart Ablation    CATARACT EXTRACTION       SECTION      x2    CHOLECYSTECTOMY      COLONOSCOPY      CYST REMOVAL      HAMMER TOE SURGERY Right 2022    right second, third, fourth PIP(proximal interphalangeal) resection arthroplasties   28285x3 performed by Cas Bro MD at [facility] (CERVIX STATUS UNKNOWN)      ORTHOPEDIC SURGERY      lt ganglion cyst removal    OSTEOTOMY Right 2022    right second, third MTP (metatarsophalangeal)  Weil osteotomies    performed by Cas Bro MD at [facility] surgery for varicose veins    TONSILLECTOMY            Medications:     Prior to Admission medications    Medication Sig Start Date End Date Taking? Authorizing Provider   esomeprazole (NEXIUM) 40 MG delayed release capsule Take 1 capsule by mouth every morning (before breakfast) 22   W Pipo Flores MD   alendronate (FOSAMAX) 70 MG tablet Take 1 tablet by mouth every 7 days 22   Darlene Tran MD   sertraline (ZOLOFT) 100 MG tablet Take 1 tablet by mouth at bedtime 22   W Pipo Flores MD   cyclobenzaprine (FLEXERIL) 10 MG tablet Take 1 tablet by mouth nightly Takes 1 HS 22   W Pipo Flores MD   gabapentin (NEURONTIN) 100 MG capsule Take 1 capsule by mouth 3 times daily for 180 days. Intended supply: 30 days 9/9/22 3/8/23  Darlene Tran MD   apixaban (ELIQUIS) 5 MG TABS tablet Take 1 tablet by mouth in the morning and 1 tablet before bedtime. TAKE ONE TABLET BY MOUTH TWICE A DAY. 22   Shanell Kuhn MD   metoprolol succinate (TOPROL XL) 50 MG extended release tablet Take 1 tablet by mouth in the morning and 1 tablet before bedtime.  22   Shanell Kuhn MD   vitamin D 25 MCG (1000 UT) CAPS Take by mouth daily    Historical Provider, MD   clobetasol (TEMOVATE) 0.05 % cream Apply topically 2 times daily 3/4/22   Ar Automatic Reconciliation   magnesium oxide (MAG-OX) 400 MG tablet Take 400 mg by mouth 2 times daily as needed    Ar Automatic Reconciliation   SUMAtriptan (IMITREX) 50 MG tablet Take 50 mg by mouth once as needed    Ar Automatic Reconciliation       Family History:     Family History   Problem Relation Age of Onset    Stroke Sister     Heart Disease Father     Cancer Father         Colon    Heart Disease Paternal Aunt     Breast Cancer Paternal Aunt 57        63 PLUS YRS    Heart Disease Paternal Uncle     Alzheimer's Disease Mother     Heart Disease Sister        Social History:      Social History     Tobacco Use    Smoking status: Never    Smokeless tobacco: Never   Substance Use Topics    Alcohol use: No           Allergies: Allergies   Allergen Reactions    Codeine Hives     Also caused hallucinations. Also caused hallucinations. Also caused hallucinations. Pitavastatin Other (See Comments)     Took zocor, lipitor and muscle symptoms  Other reaction(s): Unknown-Unspecified  Took zocor, lipitor and muscle symptoms  Other reaction(s): Myalgia  Took zocor, lipitor and muscle symptoms  Other reaction(s): Myalgia  Took zocor, lipitor and muscle symptoms            Vitals: There were no vitals taken for this visit. ROS:  Patient Health Form has been filled out, reviewed, signed and included in the chart. ROS findings related to musculoskeletal problems were discussed with the patient. Patient advised to discuss non-orthopedic complaints with primary care physician. Objective:     Vitals: There were no vitals taken for this visit. General: Awake and alert. AAOx3. Psych: Mood appropriate  HEENT: Normocephalic, atraumatic  Neck: Supple  CV/Pulm: Breathing even and unlabored  Abdomen: Nondistended  Circulation: Normal without obvious arterial or venous deficiency. Pulses palpable bilateral lower extremities. Lymphatic: No obvious lymphedema or lymphadenopathy noted. Skin: No obvious lacerations or rashes noted.   Musculoskeletal: No obvious deformity or pain with active movement of the upper extremities. Neuro: No obvious deficiency or weakness noted of the upper or lower extremities    Knee Exam:  There is pain with ROM of both knees. Range of motion on the right is 0-120. Range of motion on the left is 0-120. Trace effusion noted in the knees. Patellofemoral crepitus is present. Tenderness along the joint line both at rest and during motion. There is no significant ligamentous laxity. Distally the patient shows no neurologic deficit. Sensation is grossly intact. The patient is able to grossly plantar- and dorsi-flex the involved foot/ankle. Imaging (obtained today or previously):    Heading: XR Knee 3-4 View  Views: AP knee, skiers PA knee, lateral knee, sunrise view bilateral knee  Clinical indication: Bilateral Knee Pain   Findings: Xrays of the knees obtained today or previously show tricompartmental bone-on-bone arthritic changes with associated osteophyte formation and subchondral sclerosis. Impression: Bilateral Knee osteoarthritis    Alannah Mendoza MD      Assessment:   1. Bilateral Knee Arthritis    Plan:  Treatment option have been discussed with the patient. The patient understands the nature of knee arthritis and that this is generally a progressive condition. We discussed oral anti-inflammatory medications, activity modifications, physical therapy, corticosteroid injections, weight loss (if appropriate), and surgery. We discussed that given the degenerative nature of the joint that in most cases surgery is the definitive treatment for this condition. We did discuss some of the details of surgery along with some of the risks, benefits and alternatives. At this point the patient is a candidate for surgery however they would like to try to postpone surgery at this point in time if possible. At this point the patient has failed the aforementioned treatment modalities and would like to proceed with Corticosteroid Injection.  We discussed potential risks of steroid injection including but not limited to steroid flare with an associated increase in pain, fat necrosis, skin discoloration around the injection site, temporary increase in blood sugars in diabetic patients and possible facial flushing after injection. Treatment:    Steroid Injection - Risks, benefits, and alternatives of corticosteroid injection were discussed. After any questions were address the patient wished to proceed with injection. After prepping the injection site each knee was injected with 1cc of 40mg  Depomedrol/3cc marcaine. Patient tolerated the procedure well. Patient is instructed to ice the joint for next few days if they experience discomfort.  The patient will followup as directed or as needed         Signed By: Brennon Munguia MD  September 27, 2022

## 2022-10-03 ENCOUNTER — OFFICE VISIT (OUTPATIENT)
Dept: ORTHOPEDIC SURGERY | Age: 77
End: 2022-10-03

## 2022-10-03 DIAGNOSIS — M79.671 RIGHT FOOT PAIN: ICD-10-CM

## 2022-10-03 DIAGNOSIS — M20.5X1 ACQUIRED CLAW TOE OF RIGHT FOOT: Primary | ICD-10-CM

## 2022-10-03 PROCEDURE — 99024 POSTOP FOLLOW-UP VISIT: CPT | Performed by: NURSE PRACTITIONER

## 2022-10-03 NOTE — PROGRESS NOTES
Name: Eliseo Barbour  YOB: 1945  Gender: female  MRN: [id number]    Procedure Performed:right second, third, fourth PIP(proximal interphalangeal) resection arthroplasties, right second, third MTP (metatarsophalangeal)  Weil osteotomies        Date of Procedure: 7/26/2022      Subjective: Patient seems to be overall doing well. She does present to the visit today wearing gauze in between her third fourth and fifth toes. She notes that they seem to still be rubbing together and has caused her blisters however they are healed now. Physical Examination: Patient's incisions are well-healed there are no signs of infection. There are no blisters in between her toes. There is very mild erythema in between the third and fourth toes. She is able to wiggle her toes freely without difficulty or pain. Range of motion to the second, third, fourth, and fifth phalanx at the MTP joints formed without difficulty or pain. She has moderate swelling still to the fourth phalanx. Imaging:   No imaging reviewed          Assessment:   Status post right second, third and fourth PIP RA's with second and third MTP Weil osteotomies. We discussed progression of care today to include using toe spacers to decrease friction while swelling goes down in her toes. Her questions and concerns were addressed, she verbalized understanding of today's conversation. Plan:   3 This is stable chronic illness/condition  Treatment at this time:  Patient may continue her current treatment plan at this time including wearing comfortable shoes, elevating as needed for swelling, performing a diligent home exercise program to increase mobility of the toes minimum 3 times a week. She will begin to use toe spacers versus gauze in between her third and fourth toes, to minimize friction throughout the remaining healing process. There are no restrictions on her physical activities at this time.   She may follow-up on a as needed basis.   Studies ordered: NO XR needed @ Next Visit    Weight-bearing status: WBAT        Return to work/work restrictions: none  No medications given

## 2022-10-10 ENCOUNTER — TELEPHONE (OUTPATIENT)
Dept: ORTHOPEDIC SURGERY | Age: 77
End: 2022-10-10

## 2022-10-18 ENCOUNTER — TELEPHONE (OUTPATIENT)
Dept: CARDIOLOGY CLINIC | Age: 77
End: 2022-10-18

## 2022-10-18 NOTE — TELEPHONE ENCOUNTER
Continue meds and rest.  If still in A. fib tomorrow, give me a call and we will plan to have her come in for elective cardioversion on Thursday when I am in the hospital.  Do not miss any blood thinners. Continue other meds without change. Call tomorrow for an update and if still in A. fib we will plan cardioversion Thursday.   If she gets worse in the interim just go to the ER

## 2022-10-18 NOTE — TELEPHONE ENCOUNTER
Pt reports went into A Fib last night. HR  per [facility]. C/o headache, dizzy, tired. Denies sob, palpitations  NOW  VS  108/72/101  She took an extra Toprol 50mg today ~11a. Takes Toprol 50mg BID  Pt has not missed Elioquis 5mg BID  Taking Mag Ox 400mg BID  64oz water daily. No caffeine. No idea of trigger. Had Cortisone injection in knee 9/27/22. She will go to ER for worsening sx. Feels \"stable\" for now. Home with .  cgh

## 2022-10-18 NOTE — TELEPHONE ENCOUNTER
Informed pt of Dr. Latia Herbert' response. Pt voiced understanding and agreement with POC. She will return call tomorrow. She states HR has calmed down a lot this afternoon. Sitting with feet up, reading.  cgh

## 2022-10-19 ENCOUNTER — TELEPHONE (OUTPATIENT)
Dept: CARDIOLOGY CLINIC | Age: 77
End: 2022-10-19

## 2022-10-19 DIAGNOSIS — I48.91 A-FIB (HCC): Primary | ICD-10-CM

## 2022-10-19 NOTE — TELEPHONE ENCOUNTER
Went in to Afib yesterday and thought it had settled down but is back now. She has an appt. Friday w/ but wants to know if she can just be scheduled for CVERSION tomorrow instead?

## 2022-10-19 NOTE — TELEPHONE ENCOUNTER
Called pt to check status. Pt states HR has settled down somewhat. This AM when she got up 97/67/98. Took Toprol 50mg. States is a little light headed. Encourage po hydration with water, at least 64 oz, elevate legs when sitting. Rechecked VS now. 97/73/92. Confirmed stable. Advise check BP prior to PM Toprol. Hold Toprol if SBP <90 and/or call MD on call to review. No other BP meds to hold. Appt Fri 10/21/22 11a Dr. Tiara Mcnair, [place]. Pt voiced understanding and thanks.  cgh

## 2022-10-19 NOTE — TELEPHONE ENCOUNTER
Per Pari Beckman for tomorrow,NPO past mid-night,meds in am w/sip of water. Pt.notified and note SENT TO SCHEDULING TO SCHEDULE CVERSION FOR TOMORROW.

## 2022-10-20 ENCOUNTER — HOSPITAL ENCOUNTER (OUTPATIENT)
Dept: CARDIAC CATH/INVASIVE PROCEDURES | Age: 77
Discharge: HOME | End: 2022-10-20
Attending: INTERNAL MEDICINE | Admitting: INTERNAL MEDICINE
Payer: MEDICARE

## 2022-10-20 VITALS
RESPIRATION RATE: 16 BRPM | HEIGHT: 64 IN | DIASTOLIC BLOOD PRESSURE: 77 MMHG | HEART RATE: 89 BPM | OXYGEN SATURATION: 94 % | SYSTOLIC BLOOD PRESSURE: 127 MMHG | BODY MASS INDEX: 25.95 KG/M2 | WEIGHT: 152 LBS

## 2022-10-20 DIAGNOSIS — I48.91 ATRIAL FIBRILLATION, UNSPECIFIED TYPE (HCC): ICD-10-CM

## 2022-10-20 LAB
ANION GAP SERPL CALC-SCNC: 7 MMOL/L (ref 2–11)
BUN SERPL-MCNC: 18 MG/DL (ref 8–23)
CALCIUM SERPL-MCNC: 9.9 MG/DL (ref 8.3–10.4)
CHLORIDE SERPL-SCNC: 103 MMOL/L (ref 101–110)
CO2 SERPL-SCNC: 24 MMOL/L (ref 21–32)
CREAT SERPL-MCNC: 0.6 MG/DL (ref 0.6–1)
EKG ATRIAL RATE: 94 BPM
EKG DIAGNOSIS: NORMAL
EKG P AXIS: 88 DEGREES
EKG P-R INTERVAL: 150 MS
EKG Q-T INTERVAL: 326 MS
EKG QRS DURATION: 98 MS
EKG QTC CALCULATION (BAZETT): 407 MS
EKG R AXIS: 24 DEGREES
EKG T AXIS: 69 DEGREES
EKG VENTRICULAR RATE: 94 BPM
GLUCOSE SERPL-MCNC: 93 MG/DL (ref 65–100)
MAGNESIUM SERPL-MCNC: 2.1 MG/DL (ref 1.8–2.4)
POTASSIUM SERPL-SCNC: 4.2 MMOL/L (ref 3.5–5.1)
SODIUM SERPL-SCNC: 134 MMOL/L (ref 133–143)

## 2022-10-20 PROCEDURE — 93005 ELECTROCARDIOGRAM TRACING: CPT | Performed by: INTERNAL MEDICINE

## 2022-10-20 PROCEDURE — 80048 BASIC METABOLIC PNL TOTAL CA: CPT

## 2022-10-20 PROCEDURE — 83735 ASSAY OF MAGNESIUM: CPT

## 2022-10-20 NOTE — PROGRESS NOTES
MD reviewed labs and medications, no changes at this time. Will follow up with patient tomorrow. Discharge instructions given per orders, voiced good understanding of  care, medications & follow up care.  Denies any questions

## 2022-10-20 NOTE — PROGRESS NOTES
Patient received to [facility] room # 17  Ambulatory from [facility]. Patient scheduled for CVN today with Dr Shanna Curtis. Procedure reviewed & questions answered, voiced good understanding consent obtained & placed on chart. All medications and medical history reviewed. Will prep patient per orders. Patient & family updated on plan of care. The patient has a fraility score of 3-MANAGING WELL, based on ambulation. Ekg confirmed NSR,  notified, orders received to draw stat BMP and magnesium.

## 2022-10-21 ENCOUNTER — OFFICE VISIT (OUTPATIENT)
Dept: ORTHOPEDIC SURGERY | Age: 77
End: 2022-10-21
Payer: MEDICARE

## 2022-10-21 ENCOUNTER — OFFICE VISIT (OUTPATIENT)
Dept: CARDIOLOGY CLINIC | Age: 77
End: 2022-10-21
Payer: MEDICARE

## 2022-10-21 VITALS
SYSTOLIC BLOOD PRESSURE: 118 MMHG | DIASTOLIC BLOOD PRESSURE: 74 MMHG | BODY MASS INDEX: 27.14 KG/M2 | HEART RATE: 55 BPM | HEIGHT: 64 IN | WEIGHT: 159 LBS

## 2022-10-21 DIAGNOSIS — M17.0 PRIMARY OSTEOARTHRITIS OF BOTH KNEES: Primary | ICD-10-CM

## 2022-10-21 DIAGNOSIS — E78.5 DYSLIPIDEMIA: ICD-10-CM

## 2022-10-21 DIAGNOSIS — G47.33 OSA (OBSTRUCTIVE SLEEP APNEA): ICD-10-CM

## 2022-10-21 DIAGNOSIS — I48.0 PAROXYSMAL ATRIAL FIBRILLATION (HCC): Primary | ICD-10-CM

## 2022-10-21 PROCEDURE — G8484 FLU IMMUNIZE NO ADMIN: HCPCS | Performed by: INTERNAL MEDICINE

## 2022-10-21 PROCEDURE — G8417 CALC BMI ABV UP PARAM F/U: HCPCS | Performed by: INTERNAL MEDICINE

## 2022-10-21 PROCEDURE — G8427 DOCREV CUR MEDS BY ELIG CLIN: HCPCS | Performed by: INTERNAL MEDICINE

## 2022-10-21 PROCEDURE — 99214 OFFICE O/P EST MOD 30 MIN: CPT | Performed by: INTERNAL MEDICINE

## 2022-10-21 PROCEDURE — 1090F PRES/ABSN URINE INCON ASSESS: CPT | Performed by: INTERNAL MEDICINE

## 2022-10-21 PROCEDURE — 93000 ELECTROCARDIOGRAM COMPLETE: CPT | Performed by: INTERNAL MEDICINE

## 2022-10-21 PROCEDURE — 1123F ACP DISCUSS/DSCN MKR DOCD: CPT | Performed by: INTERNAL MEDICINE

## 2022-10-21 PROCEDURE — 1036F TOBACCO NON-USER: CPT | Performed by: INTERNAL MEDICINE

## 2022-10-21 PROCEDURE — 20610 DRAIN/INJ JOINT/BURSA W/O US: CPT | Performed by: ORTHOPAEDIC SURGERY

## 2022-10-21 PROCEDURE — G8399 PT W/DXA RESULTS DOCUMENT: HCPCS | Performed by: INTERNAL MEDICINE

## 2022-10-21 RX ORDER — AZELASTINE 1 MG/ML
1 SPRAY, METERED NASAL DAILY
COMMUNITY

## 2022-10-21 RX ORDER — HYALURONATE SODIUM 10 MG/ML
20 SYRINGE (ML) INTRAARTICULAR ONCE
Status: COMPLETED | OUTPATIENT
Start: 2022-10-21 | End: 2022-10-21

## 2022-10-21 RX ADMIN — Medication 20 MG: at 09:08

## 2022-10-21 ASSESSMENT — ENCOUNTER SYMPTOMS
DIARRHEA: 0
ABDOMINAL DISTENTION: 0
COUGH: 0
SHORTNESS OF BREATH: 0
SORE THROAT: 0
CONSTIPATION: 0
PHOTOPHOBIA: 0
ABDOMINAL PAIN: 0

## 2022-10-21 NOTE — PROGRESS NOTES
Patient ID:  Tati Rose  [id number]  89 y.o.  1945    Today: October 21, 2022          Chief Complaint:  Bilateral Knee pain    HPI:       Nicolas Mckinley is a 69 y.o. female  that presents today for evaluation and treatment of bilateral knee pain. Patient reports onset of pain some time ago but reports pain recently became more significant. The patient complains of knee pain with activities, reports stiffness of the knee with prolonged inactivity, and swelling/pain at the end of the day and after increased physical activity. Pain is described as a general ache with occasional sharp pain, primarily along the joint lines. They rate the pain ranging from 3-8 depending on the day and activity. Generally, symptoms improve with sitting/rest.  The pain affects the patients activities of daily living and quality of life. Patient reports progressive pain and instability in the knee. Injury history: No    Patient has attempted prior conservative treatment including oral medications, activity modification +/- physical therapy and prior steroid injection which only provided short term pain relief.     Past Medical History:  Past Medical History:   Diagnosis Date    A-fib ([address] 7/24/2012    Abnormal Pap smear of cervix     Acute anemia     unknown bleed    Arthritis     Dyslipidemia 4/13/2013    GERD (gastroesophageal reflux disease)     Heart failure (HCC)     a fib    History of blood transfusion     History of pulmonary embolism     after hysterectomy    Hypercholesterolemia     Insomnia 11/17/2015    Menopause     CHERYL (obstructive sleep apnea) 6/15/2012    PAF (paroxysmal atrial fibrillation) ([address] 6/15/2012    PE (pulmonary thromboembolism) [facility])     age 46 years after hysterectomy    Persistent atrial fibrillation ([address] 5/19/2014    Typical atrial flutter ([address]        Past Surgical History:  Past Surgical History:   Procedure Laterality Date    ABLATION OF DYSRHYTHMIC FOCUS  05/19/2014 Heart Ablation    CATARACT EXTRACTION       SECTION      x2    CHOLECYSTECTOMY      COLONOSCOPY      CYST REMOVAL      HAMMER TOE SURGERY Right 2022    right second, third, fourth PIP(proximal interphalangeal) resection arthroplasties   28285x3 performed by Elio Mckinney MD at [facility] (CERVIX STATUS UNKNOWN)      ORTHOPEDIC SURGERY      lt ganglion cyst removal    OSTEOTOMY Right 2022    right second, third MTP (metatarsophalangeal)  Weil osteotomies    performed by Elio Mckinney MD at [facility] surgery for varicose veins    TONSILLECTOMY          Medications:     Prior to Admission medications    Medication Sig Start Date End Date Taking? Authorizing Provider   esomeprazole (NEXIUM) 40 MG delayed release capsule Take 1 capsule by mouth every morning (before breakfast) 22   W Erum Banegas MD   alendronate (FOSAMAX) 70 MG tablet Take 1 tablet by mouth every 7 days 22   Savannah Scherer MD   sertraline (ZOLOFT) 100 MG tablet Take 1 tablet by mouth at bedtime 22   W Erum Banegas MD   cyclobenzaprine (FLEXERIL) 10 MG tablet Take 1 tablet by mouth nightly Takes 1 HS 22   W Erum Banegas MD   gabapentin (NEURONTIN) 100 MG capsule Take 1 capsule by mouth 3 times daily for 180 days. Intended supply: 30 days 9/9/22 3/8/23  Savannah Scherer MD   apixaban (ELIQUIS) 5 MG TABS tablet Take 1 tablet by mouth in the morning and 1 tablet before bedtime. TAKE ONE TABLET BY MOUTH TWICE A DAY. 22   Katheryn Rodgers MD   metoprolol succinate (TOPROL XL) 50 MG extended release tablet Take 1 tablet by mouth in the morning and 1 tablet before bedtime.  22   Katheryn Rodgers MD   vitamin D 25 MCG (1000 UT) CAPS Take by mouth daily    Historical Provider, MD   clobetasol (TEMOVATE) 0.05 % cream Apply topically 2 times daily 3/4/22   Ar Automatic Reconciliation   magnesium oxide (MAG-OX) 400 MG tablet Take 400 mg by mouth 2 movement of the upper extremities. Neuro: No obvious deficiency or weakness noted of the upper or lower extremities    Knee Exam:  There is pain with ROM of both knees. Range of motion on the right is 0-120. Range of motion on the left is 0-120. Trace effusion noted in the knees. Patellofemoral crepitus is present. Tenderness along the joint line both at rest and during motion. There is no significant ligamentous laxity. Distally the patient shows no neurologic deficit. Sensation is grossly intact. The patient is able to grossly plantar- and dorsi-flex the involved foot/ankle. Imaging (obtained today or previously):    None    Assessment:   1. Bilateral Knee Arthritis    Plan:  Treatment option have been discussed with the patient. The patient understands the nature of knee arthritis and that this is generally a progressive condition. At this point the patient has failed the aforementioned treatment modalities and would like to proceed with ViscoSupplimentation    Treatment:    Viscosupplimentation-Risks, benefits and alternatives regarding injection were discussed with patient and patient wished to proceed. Under sterile technique the knees were injected with Euflexxa (2ml). The patient tolerated procedure well. Patient is instructed to ice the joint for next few days if they experience discomfort. The patient will followup as directed.         Signed By: Josr Fox MD  October 21, 2022

## 2022-10-21 NOTE — PROGRESS NOTES
[facility] CARDIOLOGY  [address], [address]  PHONE: [phone]        NAME:  Yolis Gomez  : 1945  MRN: [id number]     PCP:  Patricia Loaiza MD      SUBJECTIVE:   Yolis Gomez is a 69 y.o. female seen for a follow up visit regarding the following:     Chief Complaint   Patient presents with    Irregular Heart Beat    Atrial Fibrillation       HPI:    Doing well s/p atrial fibrillation and atrial flutter ablation on 14. She tolerated the procedure well and was initiated on Tikosyn which she continued for about a year. She stopped the Tikosyn on 10/12/2015. Since our last visit she denies angina, CHF, presyncope or syncope but called recently with increased palpitation burden despite compliance with medications, mild associated symptoms of dyspnea and fatigue. Home BP log excellent, see scanned sheet. She wishes to avoid pharmacotherapy and antiarrhythmics. She would rather consider another ablation if possible. Checking a 14-day Holter for A. fib burden today. Labs yesterday look good. She was in sinus rhythm yesterday and today. Past Medical History, Past Surgical History, Family history, Social History, and Medications were all reviewed with the patient today and updated as necessary.      Current Outpatient Medications   Medication Sig Dispense Refill    azelastine (ASTELIN) 0.1 % nasal spray 1 spray by Nasal route daily Use in each nostril as directed      esomeprazole (NEXIUM) 40 MG delayed release capsule Take 1 capsule by mouth every morning (before breakfast) 90 capsule 3    alendronate (FOSAMAX) 70 MG tablet Take 1 tablet by mouth every 7 days 12 tablet 3    sertraline (ZOLOFT) 100 MG tablet Take 1 tablet by mouth at bedtime 90 tablet 3    cyclobenzaprine (FLEXERIL) 10 MG tablet Take 1 tablet by mouth nightly Takes 1 HS 90 tablet 3    gabapentin (NEURONTIN) 100 MG capsule Take 1 capsule by mouth 3 times daily for 180 days. Intended supply: 30 days 270 capsule 3    apixaban (ELIQUIS) 5 MG TABS tablet Take 1 tablet by mouth in the morning and 1 tablet before bedtime. TAKE ONE TABLET BY MOUTH TWICE A DAY. 180 tablet 3    metoprolol succinate (TOPROL XL) 50 MG extended release tablet Take 1 tablet by mouth in the morning and 1 tablet before bedtime. 180 tablet 3    vitamin D 25 MCG (1000 UT) CAPS Take by mouth daily      clobetasol (TEMOVATE) 0.05 % cream Apply topically 2 times daily      magnesium oxide (MAG-OX) 400 MG tablet Take 400 mg by mouth 2 times daily      SUMAtriptan (IMITREX) 50 MG tablet Take 50 mg by mouth once as needed       Current Facility-Administered Medications   Medication Dose Route Frequency Provider Last Rate Last Admin    methylPREDNISolone acetate (DEPO-MEDROL) injection 40 mg  40 mg Intra-artICUlar Once Hanane Flores MD                Allergies   Allergen Reactions    Codeine Hives     Also caused hallucinations. Also caused hallucinations. Also caused hallucinations.     Pitavastatin Other (See Comments)     Took zocor, lipitor and muscle symptoms  Other reaction(s): Unknown-Unspecified  Took zocor, lipitor and muscle symptoms  Other reaction(s): Myalgia  Took zocor, lipitor and muscle symptoms  Other reaction(s): Myalgia  Took zocor, lipitor and muscle symptoms         Patient Active Problem List    Diagnosis Date Noted    Acquired claw toe, right 06/28/2022     Overview Note:     Added automatically from request for surgery [id number]      Foot pain, right 06/28/2022     Overview Note:     Added automatically from request for surgery [id number]      Chronic sinusitis 03/12/2021    Vitamin D deficiency 03/12/2021    Non-allergic rhinitis 03/12/2021    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  A-fib ([facility] 2012     Overview Note:     1. Paroxysmal atrial fibrillation and atrial flutter   -found 2012: Hughes Cook increased to 225mg tid   -symptoms intermittently: feeling hot, mild shortness of breath, mild   palpitations   -eCardio 2012: 5 episodes of AF with HR up to 160s; patient declined   atrial fibrillation ablation   -Tikosyn initiated on 6/15/12; recurrent AF 14; amiodarone started   -AF ablation 14  2. Narrow complex SVT   -occurred on stress test in  with heart rate of 180 beats per minute   -concern was raised for possible AVNRT        CHERYL (obstructive sleep apnea) 06/15/2012     Overview Note:     1. Uses oxygen at night and an oral appliance--later caused dental   problems  2.  May need referral to sleep specialist.             Past Surgical History:   Procedure Laterality Date    ABLATION OF DYSRHYTHMIC FOCUS  2014    Heart Ablation    CATARACT EXTRACTION       SECTION      x2    CHOLECYSTECTOMY      COLONOSCOPY      CYST REMOVAL      HAMMER TOE SURGERY Right 2022    right second, third, fourth PIP(proximal interphalangeal) resection arthroplasties   28285x3 performed by Vick Craig MD at [place] [address] (CERVIX STATUS UNKNOWN)      ORTHOPEDIC SURGERY      lt ganglion cyst removal    OSTEOTOMY Right 2022    right second, third MTP (metatarsophalangeal)  Weil osteotomies    performed by Vick Craig MD at [place] [address] surgery for varicose veins    TONSILLECTOMY         Family History   Problem Relation Age of Onset    Stroke Sister     Heart Disease Father     Cancer Father         Colon    Heart Disease Paternal Aunt     Breast Cancer Paternal Aunt 57        63 PLUS YRS    Heart Disease Paternal Uncle     Alzheimer's Disease Mother     Heart Disease Sister         Social History     Tobacco Use    Smoking status: Never    Smokeless tobacco: Never   Substance Use Topics    Alcohol use: No       ROS:    Review of Systems   Constitutional:  Positive for fatigue. Negative for appetite change, chills and diaphoresis. HENT:  Negative for congestion, mouth sores, nosebleeds, sore throat and tinnitus. Eyes:  Negative for photophobia and visual disturbance. Respiratory:  Negative for cough and shortness of breath. Cardiovascular:  Positive for palpitations. Negative for chest pain and leg swelling. Gastrointestinal:  Negative for abdominal distention, abdominal pain, constipation and diarrhea. Endocrine: Negative for cold intolerance, heat intolerance, polydipsia and polyuria. Genitourinary:  Negative for dysuria and hematuria. Musculoskeletal:  Negative for arthralgias, joint swelling and myalgias. Skin:  Negative for rash. Allergic/Immunologic: Negative for environmental allergies and food allergies. Neurological:  Negative for dizziness, seizures, syncope and light-headedness. Hematological:  Negative for adenopathy. Does not bruise/bleed easily. Psychiatric/Behavioral:  Negative for agitation, behavioral problems, dysphoric mood and hallucinations. The patient is not nervous/anxious. PHYSICAL EXAM:     Vitals:    10/21/22 1053   BP: 118/74   Pulse: 55   Weight: 159 lb (72.1 kg)   Height: 5' 4\" (1.626 m)      Wt Readings from Last 3 Encounters:   10/21/22 159 lb (72.1 kg)   10/20/22 152 lb (68.9 kg)   09/09/22 161 lb (73 kg)      BP Readings from Last 3 Encounters:   10/21/22 118/74   10/20/22 127/77   09/09/22 112/72        Physical Exam  Constitutional:       Appearance: Normal appearance. She is normal weight. HENT:      Head: Normocephalic and atraumatic.       Nose: Nose normal.      Mouth/Throat:      Mouth: Mucous membranes are moist.      Pharynx: Oropharynx is clear. Eyes:      Extraocular Movements: Extraocular movements intact. Pupils: Pupils are equal, round, and reactive to light. Neck:      Vascular: No carotid bruit or JVD. Cardiovascular:      Rate and Rhythm: Normal rate and regular rhythm. Heart sounds: No murmur heard. No friction rub. No gallop. Pulmonary:      Effort: Pulmonary effort is normal.      Breath sounds: Normal breath sounds. No wheezing or rhonchi. Abdominal:      General: Abdomen is flat. Bowel sounds are normal. There is no distension. Palpations: Abdomen is soft. Tenderness: There is no abdominal tenderness. Musculoskeletal:         General: No swelling. Normal range of motion. Cervical back: Normal range of motion and neck supple. No tenderness. Skin:     General: Skin is warm and dry. Neurological:      General: No focal deficit present. Mental Status: She is alert and oriented to person, place, and time. Mental status is at baseline. Psychiatric:         Mood and Affect: Mood normal.         Behavior: Behavior normal.        Medical problems and test results were reviewed with the patient today.        Lab Results   Component Value Date    CHOL 215 (H) 09/09/2022    CHOL 187 02/17/2021    CHOL 206 (H) 12/30/2019     Lab Results   Component Value Date    TRIG 111 09/09/2022    TRIG 96 02/17/2021    TRIG 95 12/30/2019     Lab Results   Component Value Date    HDL 47 09/09/2022    HDL 46 02/17/2021    HDL 51 12/30/2019     Lab Results   Component Value Date    LDLCALC 145.8 (H) 09/09/2022    LDLCALC 121.8 (H) 02/17/2021    LDLCALC 136 12/30/2019     Lab Results   Component Value Date    LABVLDL 22.2 09/09/2022    LABVLDL 19.2 02/17/2021    LABVLDL 19 12/30/2019     Lab Results   Component Value Date    CHOLHDLRATIO 4.6 09/09/2022    CHOLHDLRATIO 4.1 02/17/2021    CHOLHDLRATIO 4.0 12/30/2019        Lab Results   Component Value Date/Time     10/20/2022 07:24 AM    K 4.2 10/20/2022 07:24 AM     10/20/2022 07:24 AM    CO2 24 10/20/2022 07:24 AM    BUN 18 10/20/2022 07:24 AM    CREATININE 0.60 10/20/2022 07:24 AM    GLUCOSE 93 10/20/2022 07:24 AM    CALCIUM 9.9 10/20/2022 07:24 AM         No results for input(s): WBC, HGB, HCT, MCV, PLT in the last 720 hours. No results found for: LABA1C  No results found for: EAG     No results found for: BNP     Lab Results   Component Value Date    TSH 0.951 02/17/2021        Results for orders placed or performed in visit on 10/21/22   EKG 12 lead    Impression    Sinus  Rhythm 77 bpm  Normal axis and intervals  Normal ST and T waves  WITHIN NORMAL LIMITS        ASSESSMENT and PLAN    Diagnoses and all orders for this visit:      Paroxysmal atrial fibrillation ([address]- ? Flares of intermittent palpitations/PAF recently, check 14-day Holter. Continue meds, EP referral if A. fib burden increasing given her desire to avoid antiarrhythmics in the past.  Continue Eliquis and metoprolol for now, see below. Orders:    -     EKG      Dyslipidemia- stable, continue diet, intolerant to all statins in the past- per Dr. Alethea Mcnair- continue [employer]/diet/exercise/weight loss measures      CHERYL (obstructive sleep apnea)- continue current regimen and O2 as needed- intolerant to oral appliance and CPAP in the past- continue diet/ exercise/weight loss      History of pulmonary embolism (s/p REDD)- resolved, continue Eliquis anyway given persistent AF history             Return in about 3 months (around 1/21/2023).          Lizzy Noyola MD  10/21/2022  11:10 AM

## 2022-11-01 ENCOUNTER — OFFICE VISIT (OUTPATIENT)
Dept: ORTHOPEDIC SURGERY | Age: 77
End: 2022-11-01
Payer: MEDICARE

## 2022-11-01 DIAGNOSIS — M17.0 PRIMARY OSTEOARTHRITIS OF BOTH KNEES: Primary | ICD-10-CM

## 2022-11-01 PROCEDURE — 20610 DRAIN/INJ JOINT/BURSA W/O US: CPT | Performed by: ORTHOPAEDIC SURGERY

## 2022-11-01 RX ORDER — HYALURONATE SODIUM 10 MG/ML
20 SYRINGE (ML) INTRAARTICULAR ONCE
Status: COMPLETED | OUTPATIENT
Start: 2022-11-01 | End: 2022-11-01

## 2022-11-01 RX ADMIN — Medication 20 MG: at 11:11

## 2022-11-01 NOTE — PROGRESS NOTES
Diagnosis: Bilateral knee arthritis    Patient present today for the 2nd viscosupplimentation injection in the knees. Prior injections have been tolerated well. Risks, benefits, and alteratives were discussed with patient prior to injection. Under sterile technique, the knees were injected with Euflexxa (2ml). The patient tolerated the procedure well. Patient is advised to ice the knee over the next week or so. Continue taking prior oral medications. Patient will follow-up as directed for the final injection in the series.

## 2022-11-08 ENCOUNTER — OFFICE VISIT (OUTPATIENT)
Dept: ORTHOPEDIC SURGERY | Age: 77
End: 2022-11-08
Payer: MEDICARE

## 2022-11-08 DIAGNOSIS — M17.0 PRIMARY OSTEOARTHRITIS OF BOTH KNEES: Primary | ICD-10-CM

## 2022-11-08 PROCEDURE — 20610 DRAIN/INJ JOINT/BURSA W/O US: CPT | Performed by: ORTHOPAEDIC SURGERY

## 2022-11-08 RX ORDER — HYALURONATE SODIUM 10 MG/ML
20 SYRINGE (ML) INTRAARTICULAR ONCE
Status: COMPLETED | OUTPATIENT
Start: 2022-11-08 | End: 2022-11-08

## 2022-11-08 RX ADMIN — Medication 20 MG: at 13:17

## 2022-11-08 NOTE — PROGRESS NOTES
Diagnosis: Bilateral knee arthritis    Patient present today for the third and final viscosupplimentation injection in the knees. Prior injections have been tolerated well. Risks, benefits, and alteratives were discussed with patient prior to injection. Under sterile technique, the knees were injected with Euflexxa (2ml). The patient tolerated the procedure well. This is the third and final injection of the series. Again the patient is advised to ice the knee over the next week or so. Continue taking prior oral medications. The patient can follow-up with me as needed.

## 2022-11-10 ENCOUNTER — HOSPITAL ENCOUNTER (OUTPATIENT)
Dept: GENERAL RADIOLOGY | Age: 77
Discharge: HOME | End: 2022-11-13
Payer: MEDICARE

## 2022-11-10 ENCOUNTER — HOSPITAL ENCOUNTER (OUTPATIENT)
Dept: ULTRASOUND IMAGING | Age: 77
Discharge: HOME | End: 2022-11-13
Payer: MEDICARE

## 2022-11-10 DIAGNOSIS — M79.89 LEFT LEG SWELLING: ICD-10-CM

## 2022-11-10 PROCEDURE — 93971 EXTREMITY STUDY: CPT

## 2022-11-11 ENCOUNTER — TELEPHONE (OUTPATIENT)
Dept: CARDIOLOGY CLINIC | Age: 77
End: 2022-11-11

## 2022-11-28 ENCOUNTER — OFFICE VISIT (OUTPATIENT)
Dept: ORTHOPEDIC SURGERY | Age: 77
End: 2022-11-28
Payer: MEDICARE

## 2022-11-28 DIAGNOSIS — M20.5X2 ACQUIRED CLAW TOE OF LEFT FOOT: Primary | ICD-10-CM

## 2022-11-28 PROCEDURE — G8428 CUR MEDS NOT DOCUMENT: HCPCS | Performed by: ORTHOPAEDIC SURGERY

## 2022-11-28 PROCEDURE — 1036F TOBACCO NON-USER: CPT | Performed by: ORTHOPAEDIC SURGERY

## 2022-11-28 PROCEDURE — G8484 FLU IMMUNIZE NO ADMIN: HCPCS | Performed by: ORTHOPAEDIC SURGERY

## 2022-11-28 PROCEDURE — G8399 PT W/DXA RESULTS DOCUMENT: HCPCS | Performed by: ORTHOPAEDIC SURGERY

## 2022-11-28 PROCEDURE — G8417 CALC BMI ABV UP PARAM F/U: HCPCS | Performed by: ORTHOPAEDIC SURGERY

## 2022-11-28 PROCEDURE — 1090F PRES/ABSN URINE INCON ASSESS: CPT | Performed by: ORTHOPAEDIC SURGERY

## 2022-11-28 PROCEDURE — 99213 OFFICE O/P EST LOW 20 MIN: CPT | Performed by: ORTHOPAEDIC SURGERY

## 2022-11-28 PROCEDURE — 1123F ACP DISCUSS/DSCN MKR DOCD: CPT | Performed by: ORTHOPAEDIC SURGERY

## 2022-11-28 NOTE — PROGRESS NOTES
Name: Governor Mia Mcneal  YOB: 1945  Gender: female  MRN: [id number]    Summary:   Left floating second toe and hallux valgus       CC: Follow-up       HPI: Governor Mia Mcnela is a 69 y.o. female who presents with Follow-up  . This patient returns today about 3 months out from a left second third and fourth Weil osteotomies and hammertoe corrections. Her main problem is floating of the second toe which is being crowded by the great toe as well as swelling in the toes. History was obtained by Patient and her     ROS/Meds/PSH/PMH/FH/SH: I personally reviewed the patients standard intake form. Below are the pertinents    Tobacco:  reports that she has never smoked. She has never used smokeless tobacco.  Diabetes: None      Physical Examination:    Exam of the left foot and ankle shows a hallux valgus. There is some floating of the second toe identified. She has palpable pulses and intact sensation. Imaging:   No imaging reviewed          Assessment:   Left floating second toe status post Weil osteotomy with hallux valgus interphalangeus    Treatment Plan:   3 This is stable chronic illness/condition  Treatment at this time: Physical Therapy  Studies ordered: NO XR needed @ Next Visit    Weight-bearing status: WBAT        Return to work/work restrictions: none  No medications given    She is good to try some supervised physical therapy as well as lead pads for this. We did discuss a minimal invasive Akin osteotomy as well as a potential second toe proximal phalangeal closing wedge osteotomy the future if she desires.

## 2022-11-30 ENCOUNTER — HOSPITAL ENCOUNTER (OUTPATIENT)
Dept: MAMMOGRAPHY | Age: 77
Discharge: HOME | End: 2022-12-03
Payer: MEDICARE

## 2022-11-30 DIAGNOSIS — Z78.0 ASYMPTOMATIC MENOPAUSAL STATE: ICD-10-CM

## 2022-11-30 PROCEDURE — 77080 DXA BONE DENSITY AXIAL: CPT

## 2022-11-30 NOTE — TELEPHONE ENCOUNTER
Pt returned call. Informed her of Dr. Sami Sandoval' response. Pt thanked me and verbalized understanding.

## 2022-12-06 ENCOUNTER — TELEPHONE (OUTPATIENT)
Dept: INTERNAL MEDICINE CLINIC | Facility: CLINIC | Age: 77
End: 2022-12-06

## 2022-12-06 DIAGNOSIS — G60.9 IDIOPATHIC PERIPHERAL NEUROPATHY: ICD-10-CM

## 2022-12-06 RX ORDER — GABAPENTIN 100 MG/1
200 CAPSULE ORAL 3 TIMES DAILY
Qty: 540 CAPSULE | Refills: 3 | Status: SHIPPED | OUTPATIENT
Start: 2022-12-06 | End: 2023-06-04

## 2022-12-06 RX ORDER — GABAPENTIN 100 MG/1
200 CAPSULE ORAL 3 TIMES DAILY
Qty: 540 CAPSULE | Refills: 3 | Status: SHIPPED | OUTPATIENT
Start: 2022-12-06 | End: 2022-12-06 | Stop reason: SDUPTHER

## 2022-12-06 NOTE — TELEPHONE ENCOUNTER
----- Message from Rashmi Audie sent at 12/6/2022  9:46 AM EST -----  Subject: Medication Problem    Medication: gabapentin (NEURONTIN) 100 MG capsule  Dosage: 200 mg 3 times daily  Ordering Provider: karla    Question/Problem: Patient called in regarding her gabapentin. She says   doctor instructed her to build her dose up, starting with 100mg and then   increasing. She is now taking 200mg 3 times daily, but her prescription is   still for the initial 100mg dosage, so she will be running out soon. She   requested to have the prescription changed to 200mg daily to reflect her   new doctor-ordered dosage increase.       Pharmacy: [facility] [place] [place] [phone] [facility] [phone]    ---------------------------------------------------------------------------  --------------  Zora Bosworth INFO  [phone]; OK to leave message on voicemail  ---------------------------------------------------------------------------  --------------    SCRIPT ANSWERS  Relationship to Patient: Self

## 2023-01-20 ASSESSMENT — ENCOUNTER SYMPTOMS
PHOTOPHOBIA: 0
ABDOMINAL DISTENTION: 0
SORE THROAT: 0
CONSTIPATION: 0
ABDOMINAL PAIN: 0
SHORTNESS OF BREATH: 0
COUGH: 0
DIARRHEA: 0

## 2023-01-21 NOTE — PROGRESS NOTES
[facility] CARDIOLOGY  [address], [address]  PHONE: [phone]        NAME:  Anusha Albrecht  : 1945  MRN: [id number]     PCP:  Nona Rueda MD      SUBJECTIVE:   Anusha Albrecht is a 69 y.o. female seen for a follow up visit regarding the following:     Chief Complaint   Patient presents with    Results     Monitor, vascular us    Atrial Fibrillation       HPI:   She is s/p atrial fibrillation and atrial flutter ablation on 14. She tolerated the procedure well and was initiated on Tikosyn which she continued for about a year. She stopped the Tikosyn on 10/12/2015. Home BP log excellent, see scanned sheet. She wishes to avoid pharmacotherapy and antiarrhythmics. She would rather consider another ablation if possible. Holter monitor 10/21/2022:  Benign PACs and PVCs  Short bursts of atrial tachycardia but no prolonged atrial fibrillation or atrial flutter whatsoever  Short burst of probable nonsustained VT versus A. tach with aberrancy  Continue current meds and keep routine follow-up    Doing well since last visit without interval angina, CHF, palpitations, edema, presyncope or syncope. Vitals controlled and tolerating meds well. Staying active without any significant limitations. Past Medical History, Past Surgical History, Family history, Social History, and Medications were all reviewed with the patient today and updated as necessary. Current Outpatient Medications   Medication Sig Dispense Refill    Bacillus Coagulans-Inulin (PROBIOTIC) 1-250 BILLION-MG CAPS Take by mouth      GLUCOS-CHOND-STEROL-FISH OIL PO Take by mouth      gabapentin (NEURONTIN) 100 MG capsule Take 2 capsules by mouth 3 times daily for 180 days.  Intended supply: 30 days 540 capsule 3    azelastine (ASTELIN) 0.1 % nasal spray 1 spray by Nasal route daily Use in each nostril as directed      esomeprazole (NEXIUM) 40 MG delayed release capsule Take 1 capsule by mouth every morning (before breakfast) 90 capsule 3    alendronate (FOSAMAX) 70 MG tablet Take 1 tablet by mouth every 7 days 12 tablet 3    sertraline (ZOLOFT) 100 MG tablet Take 1 tablet by mouth at bedtime 90 tablet 3    cyclobenzaprine (FLEXERIL) 10 MG tablet Take 1 tablet by mouth nightly Takes 1 HS 90 tablet 3    apixaban (ELIQUIS) 5 MG TABS tablet Take 1 tablet by mouth in the morning and 1 tablet before bedtime. TAKE ONE TABLET BY MOUTH TWICE A DAY. 180 tablet 3    metoprolol succinate (TOPROL XL) 50 MG extended release tablet Take 1 tablet by mouth in the morning and 1 tablet before bedtime. 180 tablet 3    vitamin D 25 MCG (1000 UT) CAPS Take by mouth daily      magnesium oxide (MAG-OX) 400 MG tablet Take 400 mg by mouth 2 times daily       No current facility-administered medications for this visit. Allergies   Allergen Reactions    Codeine Hives     Also caused hallucinations. Also caused hallucinations. Also caused hallucinations.     Pitavastatin Other (See Comments)     Took zocor, lipitor and muscle symptoms  Other reaction(s): Unknown-Unspecified  Took zocor, lipitor and muscle symptoms  Other reaction(s): Myalgia  Took zocor, lipitor and muscle symptoms  Other reaction(s): Myalgia  Took zocor, lipitor and muscle symptoms         Patient Active Problem List    Diagnosis Date Noted    Acquired claw toe, right 06/28/2022     Overview Note:     Added automatically from request for surgery [id number]      Foot pain, right 06/28/2022     Overview Note:     Added automatically from request for surgery [id number]      Chronic sinusitis 03/12/2021    Vitamin D deficiency 03/12/2021    Non-allergic rhinitis 03/12/2021    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 Paroxysmal atrial fibrillation ([facility] 2012     Overview Note:     1. Paroxysmal atrial fibrillation and atrial flutter   -found 2012: Dameon Contes increased to 225mg tid   -symptoms intermittently: feeling hot, mild shortness of breath, mild   palpitations   -eCardio 2012: 5 episodes of AF with HR up to 160s; patient declined   atrial fibrillation ablation   -Tikosyn initiated on 6/15/12; recurrent AF 14; amiodarone started   -AF ablation 14  2. Narrow complex SVT   -occurred on stress test in  with heart rate of 180 beats per minute   -concern was raised for possible AVNRT        CHERYL (obstructive sleep apnea) 06/15/2012     Overview Note:     1. Uses oxygen at night and an oral appliance--later caused dental   problems  2.  May need referral to sleep specialist.             Past Surgical History:   Procedure Laterality Date    ABLATION OF DYSRHYTHMIC FOCUS  2014    Heart Ablation    CATARACT EXTRACTION       SECTION      x2    CHOLECYSTECTOMY      COLONOSCOPY      CYST REMOVAL      HAMMER TOE SURGERY Right 2022    right second, third, fourth PIP(proximal interphalangeal) resection arthroplasties   28285x3 performed by Yasmani Tran MD at [address] (CERVIX STATUS UNKNOWN)      ORTHOPEDIC SURGERY      lt ganglion cyst removal    OSTEOTOMY Right 2022    right second, third MTP (metatarsophalangeal)  Weil osteotomies    performed by Yasmani Tran MD at [address] surgery for varicose veins    TONSILLECTOMY         Family History   Problem Relation Age of Onset    Stroke Sister     Heart Disease Father     Cancer Father         Colon    Heart Disease Paternal Aunt     Breast Cancer Paternal Aunt 57        63 PLUS YRS    Heart Disease Paternal Uncle     Alzheimer's Disease Mother Heart Disease Sister         Social History     Tobacco Use    Smoking status: Never    Smokeless tobacco: Never   Substance Use Topics    Alcohol use: No       ROS:    Review of Systems   Constitutional:  Positive for fatigue. Negative for appetite change, chills and diaphoresis. HENT:  Negative for congestion, mouth sores, nosebleeds, sore throat and tinnitus. Eyes:  Negative for photophobia and visual disturbance. Respiratory:  Negative for cough and shortness of breath. Cardiovascular:  Positive for palpitations. Negative for chest pain and leg swelling. Gastrointestinal:  Negative for abdominal distention, abdominal pain, constipation and diarrhea. Endocrine: Negative for cold intolerance, heat intolerance, polydipsia and polyuria. Genitourinary:  Negative for dysuria and hematuria. Musculoskeletal:  Negative for arthralgias, joint swelling and myalgias. Skin:  Negative for rash. Allergic/Immunologic: Negative for environmental allergies and food allergies. Neurological:  Negative for dizziness, seizures, syncope and light-headedness. Hematological:  Negative for adenopathy. Does not bruise/bleed easily. Psychiatric/Behavioral:  Negative for agitation, behavioral problems, dysphoric mood and hallucinations. The patient is not nervous/anxious. PHYSICAL EXAM:     Vitals:    01/23/23 1302   BP: 112/66   Pulse: 80   Weight: 164 lb (74.4 kg)   Height: 5' 4\" (1.626 m)      Wt Readings from Last 3 Encounters:   01/23/23 164 lb (74.4 kg)   10/21/22 159 lb (72.1 kg)   10/20/22 152 lb (68.9 kg)      BP Readings from Last 3 Encounters:   01/23/23 112/66   10/21/22 118/74   10/20/22 127/77        Physical Exam  Constitutional:       Appearance: Normal appearance. She is normal weight. HENT:      Head: Normocephalic and atraumatic. Nose: Nose normal.      Mouth/Throat:      Mouth: Mucous membranes are moist.      Pharynx: Oropharynx is clear.    Eyes:      Extraocular Movements: Extraocular movements intact. Pupils: Pupils are equal, round, and reactive to light. Neck:      Vascular: No carotid bruit or JVD. Cardiovascular:      Rate and Rhythm: Normal rate and regular rhythm. Heart sounds: No murmur heard. No friction rub. No gallop. Pulmonary:      Effort: Pulmonary effort is normal.      Breath sounds: Normal breath sounds. No wheezing or rhonchi. Abdominal:      General: Abdomen is flat. Bowel sounds are normal. There is no distension. Palpations: Abdomen is soft. Tenderness: There is no abdominal tenderness. Musculoskeletal:         General: No swelling. Normal range of motion. Cervical back: Normal range of motion and neck supple. No tenderness. Skin:     General: Skin is warm and dry. Neurological:      General: No focal deficit present. Mental Status: She is alert and oriented to person, place, and time. Mental status is at baseline. Psychiatric:         Mood and Affect: Mood normal.         Behavior: Behavior normal.        Medical problems and test results were reviewed with the patient today.        Lab Results   Component Value Date    CHOL 215 (H) 09/09/2022    CHOL 187 02/17/2021    CHOL 206 (H) 12/30/2019     Lab Results   Component Value Date    TRIG 111 09/09/2022    TRIG 96 02/17/2021    TRIG 95 12/30/2019     Lab Results   Component Value Date    HDL 47 09/09/2022    HDL 46 02/17/2021    HDL 51 12/30/2019     Lab Results   Component Value Date    LDLCALC 145.8 (H) 09/09/2022    LDLCALC 121.8 (H) 02/17/2021    LDLCALC 136 12/30/2019     Lab Results   Component Value Date    LABVLDL 22.2 09/09/2022    LABVLDL 19.2 02/17/2021    LABVLDL 19 12/30/2019     Lab Results   Component Value Date    CHOLHDLRATIO 4.6 09/09/2022    CHOLHDLRATIO 4.1 02/17/2021    CHOLHDLRATIO 4.0 12/30/2019        Lab Results   Component Value Date/Time     10/20/2022 07:24 AM    K 4.2 10/20/2022 07:24 AM     10/20/2022 07:24 AM    CO2 24 10/20/2022 07:24 AM    BUN 18 10/20/2022 07:24 AM    CREATININE 0.60 10/20/2022 07:24 AM    GLUCOSE 93 10/20/2022 07:24 AM    CALCIUM 9.9 10/20/2022 07:24 AM         No results for input(s): WBC, HGB, HCT, MCV, PLT in the last 720 hours. No results found for: LABA1C  No results found for: EAG     No results found for: BNP     Lab Results   Component Value Date    TSH 0.951 02/17/2021        No results found for any visits on 01/23/23. ASSESSMENT and PLAN    Diagnoses and all orders for this visit:      Paroxysmal atrial fibrillation (HCC)-doing great with no interval palpitations or tachycardia whatsoever. Blood pressure and heart rate well controlled, see scanned blood pressure log. Dieting and exercising. Follow clinically and continue Eliquis and beta-blockers twice daily. Orders:    -     EKG      Dyslipidemia- stable but mildly elevated, continue diet, intolerant to all statins in the past- per Dr. Charli Gilbert- wants to continue [employer]/diet/exercise/weight loss measures. If LDL continues to rise despite lifestyle modification in the future, consider Repatha or Elton Chavarria. CHERYL (obstructive sleep apnea)- continue current regimen and O2 as needed- intolerant to oral appliance and CPAP in the past- continue diet/ exercise/weight loss      History of pulmonary embolism (s/p REDD)- resolved, continue Eliquis anyway given persistent AF history          Return in about 6 months (around 7/23/2023).          Wendie Saxena MD  1/23/2023  1:19 PM

## 2023-01-23 ENCOUNTER — OFFICE VISIT (OUTPATIENT)
Dept: CARDIOLOGY CLINIC | Age: 78
End: 2023-01-23
Payer: MEDICARE

## 2023-01-23 VITALS
SYSTOLIC BLOOD PRESSURE: 112 MMHG | HEIGHT: 64 IN | WEIGHT: 164 LBS | BODY MASS INDEX: 28 KG/M2 | HEART RATE: 80 BPM | DIASTOLIC BLOOD PRESSURE: 66 MMHG

## 2023-01-23 DIAGNOSIS — I48.19 PERSISTENT ATRIAL FIBRILLATION (HCC): Primary | ICD-10-CM

## 2023-01-23 DIAGNOSIS — G47.33 OSA (OBSTRUCTIVE SLEEP APNEA): ICD-10-CM

## 2023-01-23 DIAGNOSIS — E78.5 DYSLIPIDEMIA: ICD-10-CM

## 2023-01-23 PROCEDURE — G8427 DOCREV CUR MEDS BY ELIG CLIN: HCPCS | Performed by: INTERNAL MEDICINE

## 2023-01-23 PROCEDURE — 1036F TOBACCO NON-USER: CPT | Performed by: INTERNAL MEDICINE

## 2023-01-23 PROCEDURE — G8484 FLU IMMUNIZE NO ADMIN: HCPCS | Performed by: INTERNAL MEDICINE

## 2023-01-23 PROCEDURE — 1123F ACP DISCUSS/DSCN MKR DOCD: CPT | Performed by: INTERNAL MEDICINE

## 2023-01-23 PROCEDURE — 99214 OFFICE O/P EST MOD 30 MIN: CPT | Performed by: INTERNAL MEDICINE

## 2023-01-23 PROCEDURE — 1090F PRES/ABSN URINE INCON ASSESS: CPT | Performed by: INTERNAL MEDICINE

## 2023-01-23 PROCEDURE — G8399 PT W/DXA RESULTS DOCUMENT: HCPCS | Performed by: INTERNAL MEDICINE

## 2023-01-23 PROCEDURE — G8417 CALC BMI ABV UP PARAM F/U: HCPCS | Performed by: INTERNAL MEDICINE

## 2023-01-23 RX ORDER — BACILLUS COAGULANS/INULIN 1B-250 MG
CAPSULE ORAL
COMMUNITY

## 2023-03-06 ENCOUNTER — OFFICE VISIT (OUTPATIENT)
Dept: ORTHOPEDIC SURGERY | Age: 78
End: 2023-03-06
Payer: MEDICARE

## 2023-03-06 DIAGNOSIS — M20.5X1 ACQUIRED CLAW TOE, RIGHT: Primary | ICD-10-CM

## 2023-03-06 PROCEDURE — G8399 PT W/DXA RESULTS DOCUMENT: HCPCS | Performed by: ORTHOPAEDIC SURGERY

## 2023-03-06 PROCEDURE — 1123F ACP DISCUSS/DSCN MKR DOCD: CPT | Performed by: ORTHOPAEDIC SURGERY

## 2023-03-06 PROCEDURE — 99213 OFFICE O/P EST LOW 20 MIN: CPT | Performed by: ORTHOPAEDIC SURGERY

## 2023-03-06 PROCEDURE — G8417 CALC BMI ABV UP PARAM F/U: HCPCS | Performed by: ORTHOPAEDIC SURGERY

## 2023-03-06 PROCEDURE — G8484 FLU IMMUNIZE NO ADMIN: HCPCS | Performed by: ORTHOPAEDIC SURGERY

## 2023-03-06 PROCEDURE — 1090F PRES/ABSN URINE INCON ASSESS: CPT | Performed by: ORTHOPAEDIC SURGERY

## 2023-03-06 PROCEDURE — G8428 CUR MEDS NOT DOCUMENT: HCPCS | Performed by: ORTHOPAEDIC SURGERY

## 2023-03-06 PROCEDURE — 1036F TOBACCO NON-USER: CPT | Performed by: ORTHOPAEDIC SURGERY

## 2023-03-06 NOTE — PROGRESS NOTES
Name: Aren Mendoza [facility]  YOB: 1945  Gender: female  MRN: [id number]    Summary:   Right claw toe corrections       CC: Foot Pain (Procedure Performed:right second, third, fourth PIP(proximal interphalangeal) resection arthroplasties, right second, third MTP (metatarsophalangeal)  Weil osteotomies    /Date of Procedure: 7/26/2022) and Follow-up       HPI: [facility] is a 69 y.o. female who presents with Foot Pain (Procedure Performed:right second, third, fourth PIP(proximal interphalangeal) resection arthroplasties, right second, third MTP (metatarsophalangeal)  Weil osteotomies    /Date of Procedure: 7/26/2022) and Follow-up  . Returns back today about 6 months out from right second third and fourth claw toe corrections. The fourth toe still has some swelling but she is making progress. History was obtained by Patient and her     ROS/Meds/PSH/PMH/FH/SH: I personally reviewed the patients standard intake form. Below are the pertinents    Tobacco:  reports that she has never smoked. She has never used smokeless tobacco.  Diabetes: None      Physical Examination:    Exam of the right foot shows good alignment of the toes with expected stiffness. The fourth toe still has some swelling. Imaging:   No imaging reviewed          Assessment:   Right claw toe corrections    Treatment Plan:   3 This is stable chronic illness/condition  Treatment at this time: Time with no intervention  Studies ordered: NO XR needed @ Next Visit    Weight-bearing status: WBAT        Return to work/work restrictions: none  No medications given    She will continue advance her activities as tolerated. We discussed she probably has 6 more months of healing from to reach full potential.  She can return as needed.

## 2023-03-07 ENCOUNTER — TRANSCRIBE ORDERS (OUTPATIENT)
Dept: SCHEDULING | Age: 78
End: 2023-03-07

## 2023-03-07 DIAGNOSIS — Z12.31 VISIT FOR SCREENING MAMMOGRAM: Primary | ICD-10-CM

## 2023-03-10 ENCOUNTER — OFFICE VISIT (OUTPATIENT)
Dept: INTERNAL MEDICINE CLINIC | Facility: CLINIC | Age: 78
End: 2023-03-10
Payer: MEDICARE

## 2023-03-10 VITALS
SYSTOLIC BLOOD PRESSURE: 120 MMHG | DIASTOLIC BLOOD PRESSURE: 62 MMHG | HEIGHT: 64 IN | BODY MASS INDEX: 27.66 KG/M2 | HEART RATE: 84 BPM | WEIGHT: 162 LBS | OXYGEN SATURATION: 95 %

## 2023-03-10 DIAGNOSIS — G60.9 IDIOPATHIC PERIPHERAL NEUROPATHY: Primary | ICD-10-CM

## 2023-03-10 DIAGNOSIS — R51.9 NONINTRACTABLE EPISODIC HEADACHE, UNSPECIFIED HEADACHE TYPE: ICD-10-CM

## 2023-03-10 DIAGNOSIS — M85.80 OSTEOPENIA, UNSPECIFIED LOCATION: ICD-10-CM

## 2023-03-10 DIAGNOSIS — F32.0 MAJOR DEPRESSIVE DISORDER, SINGLE EPISODE, MILD (HCC): ICD-10-CM

## 2023-03-10 PROBLEM — F32.A MILD DEPRESSION: Status: RESOLVED | Noted: 2019-12-26 | Resolved: 2023-03-10

## 2023-03-10 PROCEDURE — 1090F PRES/ABSN URINE INCON ASSESS: CPT | Performed by: INTERNAL MEDICINE

## 2023-03-10 PROCEDURE — G8427 DOCREV CUR MEDS BY ELIG CLIN: HCPCS | Performed by: INTERNAL MEDICINE

## 2023-03-10 PROCEDURE — 1036F TOBACCO NON-USER: CPT | Performed by: INTERNAL MEDICINE

## 2023-03-10 PROCEDURE — 1123F ACP DISCUSS/DSCN MKR DOCD: CPT | Performed by: INTERNAL MEDICINE

## 2023-03-10 PROCEDURE — G8484 FLU IMMUNIZE NO ADMIN: HCPCS | Performed by: INTERNAL MEDICINE

## 2023-03-10 PROCEDURE — 99214 OFFICE O/P EST MOD 30 MIN: CPT | Performed by: INTERNAL MEDICINE

## 2023-03-10 PROCEDURE — G8399 PT W/DXA RESULTS DOCUMENT: HCPCS | Performed by: INTERNAL MEDICINE

## 2023-03-10 PROCEDURE — G8417 CALC BMI ABV UP PARAM F/U: HCPCS | Performed by: INTERNAL MEDICINE

## 2023-03-10 RX ORDER — SERTRALINE HYDROCHLORIDE 100 MG/1
100 TABLET, FILM COATED ORAL NIGHTLY
Qty: 90 TABLET | Refills: 3 | Status: SHIPPED | OUTPATIENT
Start: 2023-03-10

## 2023-03-10 RX ORDER — ALENDRONATE SODIUM 70 MG/1
70 TABLET ORAL
Qty: 12 TABLET | Refills: 3 | Status: SHIPPED | OUTPATIENT
Start: 2023-03-10

## 2023-03-10 SDOH — ECONOMIC STABILITY: INCOME INSECURITY: HOW HARD IS IT FOR YOU TO PAY FOR THE VERY BASICS LIKE FOOD, HOUSING, MEDICAL CARE, AND HEATING?: NOT HARD AT ALL

## 2023-03-10 SDOH — ECONOMIC STABILITY: HOUSING INSECURITY
IN THE LAST 12 MONTHS, WAS THERE A TIME WHEN YOU DID NOT HAVE A STEADY PLACE TO SLEEP OR SLEPT IN A SHELTER (INCLUDING NOW)?: NO

## 2023-03-10 SDOH — ECONOMIC STABILITY: FOOD INSECURITY: WITHIN THE PAST 12 MONTHS, YOU WORRIED THAT YOUR FOOD WOULD RUN OUT BEFORE YOU GOT MONEY TO BUY MORE.: NEVER TRUE

## 2023-03-10 SDOH — ECONOMIC STABILITY: FOOD INSECURITY: WITHIN THE PAST 12 MONTHS, THE FOOD YOU BOUGHT JUST DIDN'T LAST AND YOU DIDN'T HAVE MONEY TO GET MORE.: NEVER TRUE

## 2023-03-10 ASSESSMENT — PATIENT HEALTH QUESTIONNAIRE - PHQ9
4. FEELING TIRED OR HAVING LITTLE ENERGY: 1
5. POOR APPETITE OR OVEREATING: 0
1. LITTLE INTEREST OR PLEASURE IN DOING THINGS: 0
SUM OF ALL RESPONSES TO PHQ QUESTIONS 1-9: 3
6. FEELING BAD ABOUT YOURSELF - OR THAT YOU ARE A FAILURE OR HAVE LET YOURSELF OR YOUR FAMILY DOWN: 0
SUM OF ALL RESPONSES TO PHQ QUESTIONS 1-9: 3
8. MOVING OR SPEAKING SO SLOWLY THAT OTHER PEOPLE COULD HAVE NOTICED. OR THE OPPOSITE, BEING SO FIGETY OR RESTLESS THAT YOU HAVE BEEN MOVING AROUND A LOT MORE THAN USUAL: 0
SUM OF ALL RESPONSES TO PHQ9 QUESTIONS 1 & 2: 0
3. TROUBLE FALLING OR STAYING ASLEEP: 2
9. THOUGHTS THAT YOU WOULD BE BETTER OFF DEAD, OR OF HURTING YOURSELF: 0
SUM OF ALL RESPONSES TO PHQ QUESTIONS 1-9: 3
7. TROUBLE CONCENTRATING ON THINGS, SUCH AS READING THE NEWSPAPER OR WATCHING TELEVISION: 0
10. IF YOU CHECKED OFF ANY PROBLEMS, HOW DIFFICULT HAVE THESE PROBLEMS MADE IT FOR YOU TO DO YOUR WORK, TAKE CARE OF THINGS AT HOME, OR GET ALONG WITH OTHER PEOPLE: 0
2. FEELING DOWN, DEPRESSED OR HOPELESS: 0
SUM OF ALL RESPONSES TO PHQ QUESTIONS 1-9: 3

## 2023-03-10 ASSESSMENT — ENCOUNTER SYMPTOMS
NAUSEA: 0
ABDOMINAL PAIN: 0

## 2023-03-10 NOTE — PROGRESS NOTES
SUBJECTIVE:   Chasidy Jewell is a 69 y.o. female seen for a visit regarding   Chief Complaint   Patient presents with    Headache    Depression        Headache  Headache pattern:  Some headache always there, and the pain level varies (frontal headache ; varies -muscle relaxer helps; tylenolflexeril 10mg hs))  Duration:  2 to 3 years  Depression  Visit Type: follow-up  Patient presents with the following symptoms: fatigue (varies-does not sleep as well). Patient is not experiencing: depressed mood, irritability and nervousness/anxiety. Past Medical History, Past Surgical History, Family history, Social History, and Medications were all reviewed with the patient today and updated as necessary. Current Outpatient Medications   Medication Sig Dispense Refill    sertraline (ZOLOFT) 100 MG tablet Take 1 tablet by mouth at bedtime 90 tablet 3    alendronate (FOSAMAX) 70 MG tablet Take 1 tablet by mouth every 7 days 12 tablet 3    gabapentin (NEURONTIN) 100 MG capsule Take 2 capsules by mouth 3 times daily for 180 days. Intended supply: 30 days 540 capsule 3    azelastine (ASTELIN) 0.1 % nasal spray 1 spray by Nasal route daily Use in each nostril as directed      esomeprazole (NEXIUM) 40 MG delayed release capsule Take 1 capsule by mouth every morning (before breakfast) 90 capsule 3    cyclobenzaprine (FLEXERIL) 10 MG tablet Take 1 tablet by mouth nightly Takes 1 HS 90 tablet 3    apixaban (ELIQUIS) 5 MG TABS tablet Take 1 tablet by mouth in the morning and 1 tablet before bedtime. TAKE ONE TABLET BY MOUTH TWICE A DAY. (Patient taking differently: Take 5 mg by mouth 2 times daily) 180 tablet 3    metoprolol succinate (TOPROL XL) 50 MG extended release tablet Take 1 tablet by mouth in the morning and 1 tablet before bedtime.  180 tablet 3    vitamin D 25 MCG (1000 UT) CAPS Take by mouth daily      magnesium oxide (MAG-OX) 400 MG tablet Take 400 mg by mouth 2 times daily      Bacillus Coagulans-Inulin (PROBIOTIC) 1-250 BILLION-MG CAPS Take by mouth (Patient not taking: Reported on 3/10/2023)      GLUCOS-CHOND-STEROL-FISH OIL PO Take by mouth (Patient not taking: Reported on 3/10/2023)       No current facility-administered medications for this visit. Allergies   Allergen Reactions    Codeine Hives     Also caused hallucinations      Pitavastatin Myalgia     Took zocor, lipitor and muscle sym           Patient Active Problem List   Diagnosis    Paroxysmal atrial fibrillation (HCC)    Dyslipidemia    Insomnia    Chronic sinusitis    Vitamin D deficiency    Persistent atrial fibrillation (HCC)    CHERYL (obstructive sleep apnea)    Non-allergic rhinitis    Chronic tension-type headache, not intractable    Osteopenia    Idiopathic peripheral neuropathy    Health care maintenance    Hypergammaglobulinemia    Acquired claw toe, right    Foot pain, right    Major depressive disorder, single episode, mild (HCC)     Social History     Tobacco Use    Smoking status: Never    Smokeless tobacco: Never   Substance Use Topics    Alcohol use: No      PHQ-9 Total Score: 3 (3/10/2023  1:57 PM)  Thoughts that you would be better off dead, or of hurting yourself in some way: 0 (3/10/2023  1:57 PM)     Review of Systems   Constitutional:  Negative for irritability. Gastrointestinal:  Negative for abdominal pain and nausea. Musculoskeletal:         Does balance classes   Neurological:  Positive for headaches. Negative for numbness (uses katey 200mg tid for neuropathy). Psychiatric/Behavioral:  Positive for depression. The patient is not nervous/anxious. OBJECTIVE:  /62 (Site: Right Upper Arm, Position: Sitting, Cuff Size: Medium Adult)   Pulse 84   Ht 5' 4\" (1.626 m)   Wt 162 lb (73.5 kg)   SpO2 95%   BMI 27.81 kg/m²      Physical Exam       Medical problems and test results were reviewed with the patient today. ASSESSMENT and PLAN     1. Idiopathic peripheral neuropathy  2. Osteopenia, unspecified location  -     alendronate (FOSAMAX) 70 MG tablet; Take 1 tablet by mouth every 7 days, Disp-12 tablet, R-3Normal  3. Nonintractable episodic headache, unspecified headache type  4. Major depressive disorder, single episode, mild (HCC)  -     sertraline (ZOLOFT) 100 MG tablet; Take 1 tablet by mouth at bedtime, Disp-90 tablet, R-3Normal     Headache helped by flexeril; katey at low dose helps neuropathy-stay at 200mg ; mood good so far-stay on fosamax until fall-discussed dexa done Sept  reviewed medications and side effects in detail   radiology results and schedule of future radiology studies reviewed with patient     Return in about 6 months (around 9/10/2023).

## 2023-04-05 ENCOUNTER — HOSPITAL ENCOUNTER (OUTPATIENT)
Dept: MAMMOGRAPHY | Age: 78
Discharge: HOME | End: 2023-04-08
Payer: MEDICARE

## 2023-04-05 DIAGNOSIS — Z12.31 VISIT FOR SCREENING MAMMOGRAM: ICD-10-CM

## 2023-04-05 PROCEDURE — 77063 BREAST TOMOSYNTHESIS BI: CPT

## 2023-05-03 ENCOUNTER — TELEPHONE (OUTPATIENT)
Dept: ORTHOPEDIC SURGERY | Age: 78
End: 2023-05-03

## 2023-05-03 DIAGNOSIS — M17.0 OSTEOARTHRITIS OF BOTH KNEES, UNSPECIFIED OSTEOARTHRITIS TYPE: Primary | ICD-10-CM

## 2023-05-16 DIAGNOSIS — F41.9 ANXIETY: Primary | ICD-10-CM

## 2023-05-16 RX ORDER — HYDROXYZINE HYDROCHLORIDE 25 MG/1
25 TABLET, FILM COATED ORAL EVERY 8 HOURS PRN
Qty: 30 TABLET | Refills: 1 | Status: SHIPPED | OUTPATIENT
Start: 2023-05-16 | End: 2023-05-26

## 2023-05-26 DIAGNOSIS — E78.2 MIXED HYPERLIPIDEMIA: Primary | ICD-10-CM

## 2023-05-26 DIAGNOSIS — I48.19 OTHER PERSISTENT ATRIAL FIBRILLATION (HCC): ICD-10-CM

## 2023-05-30 ENCOUNTER — OFFICE VISIT (OUTPATIENT)
Dept: ORTHOPEDIC SURGERY | Age: 78
End: 2023-05-30

## 2023-05-30 DIAGNOSIS — M25.562 PAIN IN BOTH KNEES, UNSPECIFIED CHRONICITY: Primary | ICD-10-CM

## 2023-05-30 DIAGNOSIS — M17.0 PRIMARY OSTEOARTHRITIS OF BOTH KNEES: ICD-10-CM

## 2023-05-30 DIAGNOSIS — M25.561 PAIN IN BOTH KNEES, UNSPECIFIED CHRONICITY: Primary | ICD-10-CM

## 2023-05-30 RX ORDER — METHYLPREDNISOLONE ACETATE 40 MG/ML
40 INJECTION, SUSPENSION INTRA-ARTICULAR; INTRALESIONAL; INTRAMUSCULAR; SOFT TISSUE ONCE
Status: COMPLETED | OUTPATIENT
Start: 2023-05-30 | End: 2023-05-30

## 2023-05-30 RX ORDER — HYALURONATE SODIUM 10 MG/ML
20 SYRINGE (ML) INTRAARTICULAR ONCE
Status: COMPLETED | OUTPATIENT
Start: 2023-05-30 | End: 2023-05-30

## 2023-05-30 RX ADMIN — METHYLPREDNISOLONE ACETATE 40 MG: 40 INJECTION, SUSPENSION INTRA-ARTICULAR; INTRALESIONAL; INTRAMUSCULAR; SOFT TISSUE at 13:46

## 2023-05-30 RX ADMIN — Medication 20 MG: at 13:47

## 2023-05-30 NOTE — PROGRESS NOTES
Patient ID:  Benedict Coronado  [id number]  89 y.o.  1945    Today: May 30, 2023          Chief Complaint:  Bilateral Knee pain    HPI:       Demetri Barrera is a 69 y.o. female  that presents today for evaluation and treatment of bilateral knee pain. Patient reports onset of pain some time ago but reports pain recently became more significant. The patient complains of knee pain with activities, reports stiffness of the knee with prolonged inactivity, and swelling/pain at the end of the day and after increased physical activity. Pain is described as a general ache with occasional sharp pain, primarily along the joint lines. They rate the pain ranging from 3-8 depending on the day and activity. Generally, symptoms improve with sitting/rest.  The pain affects the patients activities of daily living and quality of life. Patient reports progressive pain and instability in the knee. Injury history: No    Patient has attempted prior conservative treatment including oral medications, activity modification +/- physical therapy and prior steroid injection which only provided short term pain relief.     Past Medical History:  Past Medical History:   Diagnosis Date    A-fib ([address] 7/24/2012    Abnormal Pap smear of cervix     Acute anemia     unknown bleed    Arthritis     Dyslipidemia 4/13/2013    GERD (gastroesophageal reflux disease)     Heart failure (HCC)     a fib    History of blood transfusion     History of pulmonary embolism     after hysterectomy    Hypercholesterolemia     Insomnia 11/17/2015    Menopause     CHERYL (obstructive sleep apnea) 6/15/2012    PAF (paroxysmal atrial fibrillation) ([address] 6/15/2012    PE (pulmonary thromboembolism) [facility])     age 46 years after hysterectomy    Persistent atrial fibrillation ([address] 5/19/2014    Typical atrial flutter ([address]        Past Surgical History:  Past Surgical History:   Procedure Laterality Date    ABLATION OF DYSRHYTHMIC FOCUS  05/19/2014

## 2023-06-04 ENCOUNTER — PATIENT MESSAGE (OUTPATIENT)
Dept: INTERNAL MEDICINE CLINIC | Facility: CLINIC | Age: 78
End: 2023-06-04

## 2023-06-05 RX ORDER — IPRATROPIUM BROMIDE 42 UG/1
2 SPRAY, METERED NASAL 4 TIMES DAILY
Qty: 45 ML | Refills: 3 | Status: SHIPPED | OUTPATIENT
Start: 2023-06-05

## 2023-06-05 NOTE — TELEPHONE ENCOUNTER
From: Maritza Kennedy  To: Dr. Janie Morton: 2023 3:54 PM EDT  Subject: Refill for Iratropium Bromide Nasal Solution    My rx for this has . Could you please send a new one to [place] [phone]. I get 90 day days at a time. Thank you.

## 2023-06-06 ENCOUNTER — OFFICE VISIT (OUTPATIENT)
Dept: ORTHOPEDIC SURGERY | Age: 78
End: 2023-06-06
Payer: MEDICARE

## 2023-06-06 DIAGNOSIS — M17.0 PRIMARY OSTEOARTHRITIS OF BOTH KNEES: Primary | ICD-10-CM

## 2023-06-06 PROCEDURE — 20610 DRAIN/INJ JOINT/BURSA W/O US: CPT | Performed by: ORTHOPAEDIC SURGERY

## 2023-06-06 RX ORDER — HYALURONATE SODIUM 10 MG/ML
20 SYRINGE (ML) INTRAARTICULAR ONCE
Status: COMPLETED | OUTPATIENT
Start: 2023-06-06 | End: 2023-06-06

## 2023-06-06 RX ADMIN — Medication 20 MG: at 13:24

## 2023-08-06 ASSESSMENT — ENCOUNTER SYMPTOMS
COUGH: 0
DIARRHEA: 0
ABDOMINAL DISTENTION: 0
ABDOMINAL PAIN: 0
SHORTNESS OF BREATH: 0
PHOTOPHOBIA: 0
CONSTIPATION: 0
SORE THROAT: 0

## 2023-08-06 NOTE — PROGRESS NOTES
[facility] CARDIOLOGY  [address] [place] [address]  PHONE: [phone]        NAME:  Maggie Kolb  : 1945  MRN: [id number]     PCP:  Evan Marcos MD      SUBJECTIVE:   Maggie Kolb is a 69 y.o. female seen for a follow up visit regarding the following:     Chief Complaint   Patient presents with    Atrial Fibrillation       HPI:     She is s/p atrial fibrillation and atrial flutter ablation on 14. She tolerated the procedure well and was initiated on Tikosyn which she continued for about a year. She stopped the Tikosyn on 10/12/2015. Home BP log excellent, see scanned sheet. She wishes to avoid pharmacotherapy and antiarrhythmics. She would rather consider another ablation if AF proves recurrent. Holter monitor 10/21/2022:  Benign PACs and PVCs  Short bursts of atrial tachycardia but no prolonged atrial fibrillation or atrial flutter whatsoever  Short burst of probable nonsustained VT versus A. tach with aberrancy  Continue current meds and keep routine follow-up    Doing well since last visit without interval angina, CHF, palpitations, edema, presyncope or syncope. Vitals controlled and tolerating meds well. Staying active without any significant limitations. No bleeding on eliquis. Past Medical History, Past Surgical History, Family history, Social History, and Medications were all reviewed with the patient today and updated as necessary.      Current Outpatient Medications   Medication Sig Dispense Refill    ipratropium (ATROVENT) 0.06 % nasal spray 2 sprays by Nasal route 4 times daily 45 mL 3    sertraline (ZOLOFT) 100 MG tablet Take 1 tablet by mouth at bedtime 90 tablet 3    alendronate (FOSAMAX) 70 MG tablet Take 1 tablet by mouth every 7 days 12 tablet 3    Bacillus Coagulans-Inulin (PROBIOTIC) 1-250 BILLION-MG CAPS Take by mouth      azelastine (ASTELIN) 0.1 % nasal spray 1 spray by Nasal route daily Use

## 2023-08-07 ENCOUNTER — OFFICE VISIT (OUTPATIENT)
Age: 78
End: 2023-08-07
Payer: MEDICARE

## 2023-08-07 VITALS
DIASTOLIC BLOOD PRESSURE: 70 MMHG | BODY MASS INDEX: 27.31 KG/M2 | WEIGHT: 160 LBS | SYSTOLIC BLOOD PRESSURE: 130 MMHG | HEIGHT: 64 IN | HEART RATE: 75 BPM

## 2023-08-07 DIAGNOSIS — I48.0 PAROXYSMAL ATRIAL FIBRILLATION (HCC): Primary | ICD-10-CM

## 2023-08-07 DIAGNOSIS — G47.33 OSA (OBSTRUCTIVE SLEEP APNEA): ICD-10-CM

## 2023-08-07 DIAGNOSIS — E78.5 DYSLIPIDEMIA: ICD-10-CM

## 2023-08-07 PROCEDURE — G8417 CALC BMI ABV UP PARAM F/U: HCPCS | Performed by: INTERNAL MEDICINE

## 2023-08-07 PROCEDURE — G8399 PT W/DXA RESULTS DOCUMENT: HCPCS | Performed by: INTERNAL MEDICINE

## 2023-08-07 PROCEDURE — 1123F ACP DISCUSS/DSCN MKR DOCD: CPT | Performed by: INTERNAL MEDICINE

## 2023-08-07 PROCEDURE — 1090F PRES/ABSN URINE INCON ASSESS: CPT | Performed by: INTERNAL MEDICINE

## 2023-08-07 PROCEDURE — G8427 DOCREV CUR MEDS BY ELIG CLIN: HCPCS | Performed by: INTERNAL MEDICINE

## 2023-08-07 PROCEDURE — 1036F TOBACCO NON-USER: CPT | Performed by: INTERNAL MEDICINE

## 2023-08-07 PROCEDURE — 99214 OFFICE O/P EST MOD 30 MIN: CPT | Performed by: INTERNAL MEDICINE

## 2023-08-07 PROCEDURE — 93000 ELECTROCARDIOGRAM COMPLETE: CPT | Performed by: INTERNAL MEDICINE

## 2023-08-07 RX ORDER — METOPROLOL SUCCINATE 50 MG/1
50 TABLET, EXTENDED RELEASE ORAL 2 TIMES DAILY
Qty: 180 TABLET | Refills: 3 | Status: SHIPPED | OUTPATIENT
Start: 2023-08-07

## 2023-09-13 DIAGNOSIS — E78.2 MIXED HYPERLIPIDEMIA: ICD-10-CM

## 2023-09-13 DIAGNOSIS — I48.19 OTHER PERSISTENT ATRIAL FIBRILLATION (HCC): ICD-10-CM

## 2023-09-13 LAB
ALBUMIN SERPL-MCNC: 3.6 G/DL (ref 3.2–4.6)
ALBUMIN/GLOB SERPL: 1.1 (ref 0.4–1.6)
ALP SERPL-CCNC: 59 U/L (ref 50–136)
ALT SERPL-CCNC: 25 U/L (ref 12–65)
ANION GAP SERPL CALC-SCNC: 4 MMOL/L (ref 2–11)
AST SERPL-CCNC: 17 U/L (ref 15–37)
BASOPHILS # BLD: 0 K/UL (ref 0–0.2)
BASOPHILS NFR BLD: 0 % (ref 0–2)
BILIRUB SERPL-MCNC: 0.4 MG/DL (ref 0.2–1.1)
BUN SERPL-MCNC: 18 MG/DL (ref 8–23)
CALCIUM SERPL-MCNC: 9.2 MG/DL (ref 8.3–10.4)
CHLORIDE SERPL-SCNC: 105 MMOL/L (ref 101–110)
CHOLEST SERPL-MCNC: 215 MG/DL
CO2 SERPL-SCNC: 29 MMOL/L (ref 21–32)
CREAT SERPL-MCNC: 0.8 MG/DL (ref 0.6–1)
DIFFERENTIAL METHOD BLD: ABNORMAL
EOSINOPHIL # BLD: 0.1 K/UL (ref 0–0.8)
EOSINOPHIL NFR BLD: 1 % (ref 0.5–7.8)
ERYTHROCYTE [DISTWIDTH] IN BLOOD BY AUTOMATED COUNT: 12.9 % (ref 11.9–14.6)
GLOBULIN SER CALC-MCNC: 3.4 G/DL (ref 2.8–4.5)
GLUCOSE SERPL-MCNC: 106 MG/DL (ref 65–100)
HCT VFR BLD AUTO: 37.9 % (ref 35.8–46.3)
HDLC SERPL-MCNC: 43 MG/DL (ref 40–60)
HDLC SERPL: 5
HGB BLD-MCNC: 12.3 G/DL (ref 11.7–15.4)
IMM GRANULOCYTES # BLD AUTO: 0 K/UL (ref 0–0.5)
IMM GRANULOCYTES NFR BLD AUTO: 0 % (ref 0–5)
LDLC SERPL CALC-MCNC: 143 MG/DL
LYMPHOCYTES # BLD: 1.2 K/UL (ref 0.5–4.6)
LYMPHOCYTES NFR BLD: 26 % (ref 13–44)
MCH RBC QN AUTO: 31.9 PG (ref 26.1–32.9)
MCHC RBC AUTO-ENTMCNC: 32.5 G/DL (ref 31.4–35)
MCV RBC AUTO: 98.4 FL (ref 82–102)
MONOCYTES # BLD: 0.4 K/UL (ref 0.1–1.3)
MONOCYTES NFR BLD: 9 % (ref 4–12)
NEUTS SEG # BLD: 2.8 K/UL (ref 1.7–8.2)
NEUTS SEG NFR BLD: 64 % (ref 43–78)
NRBC # BLD: 0 K/UL (ref 0–0.2)
PLATELET # BLD AUTO: 217 K/UL (ref 150–450)
PMV BLD AUTO: 9.8 FL (ref 9.4–12.3)
POTASSIUM SERPL-SCNC: 4.5 MMOL/L (ref 3.5–5.1)
PROT SERPL-MCNC: 7 G/DL (ref 6.3–8.2)
RBC # BLD AUTO: 3.85 M/UL (ref 4.05–5.2)
SODIUM SERPL-SCNC: 138 MMOL/L (ref 133–143)
TRIGL SERPL-MCNC: 145 MG/DL (ref 35–150)
TSH, 3RD GENERATION: 1.8 UIU/ML (ref 0.36–3.74)
VLDLC SERPL CALC-MCNC: 29 MG/DL (ref 6–23)
WBC # BLD AUTO: 4.5 K/UL (ref 4.3–11.1)

## 2023-09-15 ENCOUNTER — OFFICE VISIT (OUTPATIENT)
Dept: INTERNAL MEDICINE CLINIC | Facility: CLINIC | Age: 78
End: 2023-09-15

## 2023-09-15 VITALS
SYSTOLIC BLOOD PRESSURE: 90 MMHG | HEART RATE: 84 BPM | BODY MASS INDEX: 25.23 KG/M2 | HEIGHT: 66 IN | OXYGEN SATURATION: 98 % | WEIGHT: 157 LBS | DIASTOLIC BLOOD PRESSURE: 80 MMHG

## 2023-09-15 DIAGNOSIS — R51.9 NONINTRACTABLE EPISODIC HEADACHE, UNSPECIFIED HEADACHE TYPE: ICD-10-CM

## 2023-09-15 DIAGNOSIS — G25.0 BENIGN ESSENTIAL TREMOR: ICD-10-CM

## 2023-09-15 DIAGNOSIS — G60.9 IDIOPATHIC PERIPHERAL NEUROPATHY: ICD-10-CM

## 2023-09-15 DIAGNOSIS — F32.0 MAJOR DEPRESSIVE DISORDER, SINGLE EPISODE, MILD (HCC): ICD-10-CM

## 2023-09-15 DIAGNOSIS — F41.9 ANXIETY: ICD-10-CM

## 2023-09-15 DIAGNOSIS — I48.0 PAROXYSMAL ATRIAL FIBRILLATION (HCC): ICD-10-CM

## 2023-09-15 DIAGNOSIS — M85.80 OSTEOPENIA, UNSPECIFIED LOCATION: ICD-10-CM

## 2023-09-15 DIAGNOSIS — K21.9 GASTROESOPHAGEAL REFLUX DISEASE WITHOUT ESOPHAGITIS: ICD-10-CM

## 2023-09-15 DIAGNOSIS — Z00.00 MEDICARE ANNUAL WELLNESS VISIT, SUBSEQUENT: Primary | ICD-10-CM

## 2023-09-15 DIAGNOSIS — J30.1 NON-SEASONAL ALLERGIC RHINITIS DUE TO POLLEN: ICD-10-CM

## 2023-09-15 RX ORDER — CYCLOBENZAPRINE HCL 10 MG
10 TABLET ORAL NIGHTLY
Qty: 90 TABLET | Refills: 3 | Status: SHIPPED | OUTPATIENT
Start: 2023-09-15

## 2023-09-15 RX ORDER — ESOMEPRAZOLE MAGNESIUM 40 MG/1
40 CAPSULE, DELAYED RELEASE ORAL
Qty: 90 CAPSULE | Refills: 3 | Status: SHIPPED | OUTPATIENT
Start: 2023-09-15

## 2023-09-15 RX ORDER — AZELASTINE 1 MG/ML
1 SPRAY, METERED NASAL DAILY
Qty: 30 ML | Refills: 2 | Status: SHIPPED | OUTPATIENT
Start: 2023-09-15

## 2023-09-15 RX ORDER — BACILLUS COAGULANS/INULIN 1B-250 MG
1 CAPSULE ORAL DAILY
Qty: 60 CAPSULE | Refills: 3 | Status: CANCELLED | OUTPATIENT
Start: 2023-09-15

## 2023-09-15 RX ORDER — IPRATROPIUM BROMIDE 42 UG/1
2 SPRAY, METERED NASAL 4 TIMES DAILY
Qty: 45 ML | Refills: 3 | Status: SHIPPED | OUTPATIENT
Start: 2023-09-15

## 2023-09-15 RX ORDER — MAGNESIUM OXIDE 400 MG/1
400 TABLET ORAL 2 TIMES DAILY
Qty: 30 TABLET | Refills: 2 | Status: CANCELLED | OUTPATIENT
Start: 2023-09-15

## 2023-09-15 RX ORDER — SERTRALINE HYDROCHLORIDE 100 MG/1
100 TABLET, FILM COATED ORAL NIGHTLY
Qty: 90 TABLET | Refills: 3 | Status: SHIPPED | OUTPATIENT
Start: 2023-09-15

## 2023-09-15 RX ORDER — ALENDRONATE SODIUM 70 MG/1
70 TABLET ORAL
Qty: 12 TABLET | Refills: 3 | Status: CANCELLED | OUTPATIENT
Start: 2023-09-15

## 2023-09-15 RX ORDER — GABAPENTIN 300 MG/1
300 CAPSULE ORAL 3 TIMES DAILY
Qty: 270 CAPSULE | Refills: 3 | Status: SHIPPED | OUTPATIENT
Start: 2023-09-15 | End: 2024-09-14

## 2023-09-15 RX ORDER — HYDROXYZINE HYDROCHLORIDE 25 MG/1
25 TABLET, FILM COATED ORAL EVERY 8 HOURS PRN
Qty: 60 TABLET | Refills: 2 | Status: SHIPPED | OUTPATIENT
Start: 2023-09-15 | End: 2023-12-14

## 2023-09-15 ASSESSMENT — PATIENT HEALTH QUESTIONNAIRE - PHQ9
10. IF YOU CHECKED OFF ANY PROBLEMS, HOW DIFFICULT HAVE THESE PROBLEMS MADE IT FOR YOU TO DO YOUR WORK, TAKE CARE OF THINGS AT HOME, OR GET ALONG WITH OTHER PEOPLE: 0
9. THOUGHTS THAT YOU WOULD BE BETTER OFF DEAD, OR OF HURTING YOURSELF: 0
1. LITTLE INTEREST OR PLEASURE IN DOING THINGS: 0
SUM OF ALL RESPONSES TO PHQ QUESTIONS 1-9: 1
8. MOVING OR SPEAKING SO SLOWLY THAT OTHER PEOPLE COULD HAVE NOTICED. OR THE OPPOSITE, BEING SO FIGETY OR RESTLESS THAT YOU HAVE BEEN MOVING AROUND A LOT MORE THAN USUAL: 0
7. TROUBLE CONCENTRATING ON THINGS, SUCH AS READING THE NEWSPAPER OR WATCHING TELEVISION: 0
SUM OF ALL RESPONSES TO PHQ QUESTIONS 1-9: 1
6. FEELING BAD ABOUT YOURSELF - OR THAT YOU ARE A FAILURE OR HAVE LET YOURSELF OR YOUR FAMILY DOWN: 0
SUM OF ALL RESPONSES TO PHQ9 QUESTIONS 1 & 2: 0
4. FEELING TIRED OR HAVING LITTLE ENERGY: 1
3. TROUBLE FALLING OR STAYING ASLEEP: 0
5. POOR APPETITE OR OVEREATING: 0
SUM OF ALL RESPONSES TO PHQ QUESTIONS 1-9: 1
2. FEELING DOWN, DEPRESSED OR HOPELESS: 0
SUM OF ALL RESPONSES TO PHQ QUESTIONS 1-9: 1

## 2023-09-15 ASSESSMENT — LIFESTYLE VARIABLES
HOW MANY STANDARD DRINKS CONTAINING ALCOHOL DO YOU HAVE ON A TYPICAL DAY: PATIENT DOES NOT DRINK
HOW OFTEN DO YOU HAVE A DRINK CONTAINING ALCOHOL: NEVER
HOW MANY STANDARD DRINKS CONTAINING ALCOHOL DO YOU HAVE ON A TYPICAL DAY: PATIENT DOES NOT DRINK
HOW OFTEN DO YOU HAVE A DRINK CONTAINING ALCOHOL: NEVER

## 2023-09-15 ASSESSMENT — ENCOUNTER SYMPTOMS
COUGH: 0
SHORTNESS OF BREATH: 0

## 2023-09-15 NOTE — PROGRESS NOTES
Reviewed and updated this visit:  Tobacco  Allergies  Meds  Problems  Med Hx  Surg Hx  Soc Hx  Fam Hx

## 2023-11-27 DIAGNOSIS — M17.0 PRIMARY OSTEOARTHRITIS OF BOTH KNEES: Primary | ICD-10-CM

## 2024-01-02 ENCOUNTER — OFFICE VISIT (OUTPATIENT)
Dept: ORTHOPEDIC SURGERY | Age: 79
End: 2024-01-02
Payer: MEDICARE

## 2024-01-02 DIAGNOSIS — M17.0 PRIMARY OSTEOARTHRITIS OF BOTH KNEES: Primary | ICD-10-CM

## 2024-01-02 PROCEDURE — 20610 DRAIN/INJ JOINT/BURSA W/O US: CPT | Performed by: ORTHOPAEDIC SURGERY

## 2024-01-02 RX ORDER — HYALURONATE SODIUM 10 MG/ML
20 SYRINGE (ML) INTRAARTICULAR ONCE
Status: COMPLETED | OUTPATIENT
Start: 2024-01-02 | End: 2024-01-02

## 2024-01-02 RX ADMIN — Medication 20 MG: at 13:14

## 2024-01-09 ENCOUNTER — OFFICE VISIT (OUTPATIENT)
Dept: ORTHOPEDIC SURGERY | Age: 79
End: 2024-01-09
Payer: MEDICARE

## 2024-01-09 DIAGNOSIS — M17.0 PRIMARY OSTEOARTHRITIS OF BOTH KNEES: Primary | ICD-10-CM

## 2024-01-09 PROCEDURE — 20610 DRAIN/INJ JOINT/BURSA W/O US: CPT | Performed by: NURSE PRACTITIONER

## 2024-01-09 RX ORDER — HYALURONATE SODIUM 10 MG/ML
20 SYRINGE (ML) INTRAARTICULAR ONCE
Status: COMPLETED | OUTPATIENT
Start: 2024-01-09 | End: 2024-01-09

## 2024-01-09 RX ADMIN — Medication 20 MG: at 14:08

## 2024-01-09 NOTE — PROGRESS NOTES
Diagnosis: Bilateral knee arthritis    Patient present today for the third and final viscosupplimentation injection in the knees. Prior injections have been tolerated well.    Risks, benefits, and alteratives were discussed with patient prior to injection.    Under sterile technique, the knees were injected with Euflexxa (2ml).  The patient tolerated the procedure well.    This is the third and final injection of the series.  Again the patient is advised to ice the knee over the next week or so.  Continue taking prior oral medications.   The patient can follow-up with me as needed.      MARIO LEAL, APRN - CNP

## 2024-03-12 ENCOUNTER — TRANSCRIBE ORDERS (OUTPATIENT)
Dept: SCHEDULING | Age: 79
End: 2024-03-12

## 2024-03-12 DIAGNOSIS — Z12.31 ENCOUNTER FOR SCREENING MAMMOGRAM FOR MALIGNANT NEOPLASM OF BREAST: Primary | ICD-10-CM

## 2024-03-18 ENCOUNTER — OFFICE VISIT (OUTPATIENT)
Dept: PRIMARY CARE CLINIC | Facility: CLINIC | Age: 79
End: 2024-03-18
Payer: MEDICARE

## 2024-03-18 VITALS
DIASTOLIC BLOOD PRESSURE: 78 MMHG | BODY MASS INDEX: 25.78 KG/M2 | OXYGEN SATURATION: 97 % | SYSTOLIC BLOOD PRESSURE: 122 MMHG | HEIGHT: 66 IN | WEIGHT: 160.4 LBS | HEART RATE: 77 BPM

## 2024-03-18 DIAGNOSIS — M85.80 OSTEOPENIA, UNSPECIFIED LOCATION: ICD-10-CM

## 2024-03-18 DIAGNOSIS — G62.9 NEUROPATHY: ICD-10-CM

## 2024-03-18 DIAGNOSIS — F32.0 MAJOR DEPRESSIVE DISORDER, SINGLE EPISODE, MILD (HCC): ICD-10-CM

## 2024-03-18 DIAGNOSIS — D68.69 SECONDARY HYPERCOAGULABLE STATE (HCC): ICD-10-CM

## 2024-03-18 DIAGNOSIS — I48.0 PAROXYSMAL ATRIAL FIBRILLATION (HCC): Primary | ICD-10-CM

## 2024-03-18 PROCEDURE — G8484 FLU IMMUNIZE NO ADMIN: HCPCS | Performed by: INTERNAL MEDICINE

## 2024-03-18 PROCEDURE — G8399 PT W/DXA RESULTS DOCUMENT: HCPCS | Performed by: INTERNAL MEDICINE

## 2024-03-18 PROCEDURE — G8417 CALC BMI ABV UP PARAM F/U: HCPCS | Performed by: INTERNAL MEDICINE

## 2024-03-18 PROCEDURE — 1123F ACP DISCUSS/DSCN MKR DOCD: CPT | Performed by: INTERNAL MEDICINE

## 2024-03-18 PROCEDURE — 1036F TOBACCO NON-USER: CPT | Performed by: INTERNAL MEDICINE

## 2024-03-18 PROCEDURE — G8427 DOCREV CUR MEDS BY ELIG CLIN: HCPCS | Performed by: INTERNAL MEDICINE

## 2024-03-18 PROCEDURE — 1090F PRES/ABSN URINE INCON ASSESS: CPT | Performed by: INTERNAL MEDICINE

## 2024-03-18 PROCEDURE — 99204 OFFICE O/P NEW MOD 45 MIN: CPT | Performed by: INTERNAL MEDICINE

## 2024-03-18 RX ORDER — LIDOCAINE 50 MG/G
OINTMENT TOPICAL
Qty: 240 G | Refills: 1 | Status: SHIPPED | OUTPATIENT
Start: 2024-03-18

## 2024-03-18 SDOH — ECONOMIC STABILITY: FOOD INSECURITY: WITHIN THE PAST 12 MONTHS, THE FOOD YOU BOUGHT JUST DIDN'T LAST AND YOU DIDN'T HAVE MONEY TO GET MORE.: NEVER TRUE

## 2024-03-18 SDOH — ECONOMIC STABILITY: FOOD INSECURITY: WITHIN THE PAST 12 MONTHS, YOU WORRIED THAT YOUR FOOD WOULD RUN OUT BEFORE YOU GOT MONEY TO BUY MORE.: NEVER TRUE

## 2024-03-18 SDOH — ECONOMIC STABILITY: INCOME INSECURITY: HOW HARD IS IT FOR YOU TO PAY FOR THE VERY BASICS LIKE FOOD, HOUSING, MEDICAL CARE, AND HEATING?: NOT HARD AT ALL

## 2024-03-18 ASSESSMENT — PATIENT HEALTH QUESTIONNAIRE - PHQ9
4. FEELING TIRED OR HAVING LITTLE ENERGY: NOT AT ALL
9. THOUGHTS THAT YOU WOULD BE BETTER OFF DEAD, OR OF HURTING YOURSELF: NOT AT ALL
6. FEELING BAD ABOUT YOURSELF - OR THAT YOU ARE A FAILURE OR HAVE LET YOURSELF OR YOUR FAMILY DOWN: NOT AT ALL
SUM OF ALL RESPONSES TO PHQ QUESTIONS 1-9: 0
SUM OF ALL RESPONSES TO PHQ QUESTIONS 1-9: 0
3. TROUBLE FALLING OR STAYING ASLEEP: NOT AT ALL
5. POOR APPETITE OR OVEREATING: NOT AT ALL
SUM OF ALL RESPONSES TO PHQ9 QUESTIONS 1 & 2: 0
1. LITTLE INTEREST OR PLEASURE IN DOING THINGS: NOT AT ALL
SUM OF ALL RESPONSES TO PHQ QUESTIONS 1-9: 0
7. TROUBLE CONCENTRATING ON THINGS, SUCH AS READING THE NEWSPAPER OR WATCHING TELEVISION: NOT AT ALL
8. MOVING OR SPEAKING SO SLOWLY THAT OTHER PEOPLE COULD HAVE NOTICED. OR THE OPPOSITE, BEING SO FIGETY OR RESTLESS THAT YOU HAVE BEEN MOVING AROUND A LOT MORE THAN USUAL: NOT AT ALL
2. FEELING DOWN, DEPRESSED OR HOPELESS: NOT AT ALL
10. IF YOU CHECKED OFF ANY PROBLEMS, HOW DIFFICULT HAVE THESE PROBLEMS MADE IT FOR YOU TO DO YOUR WORK, TAKE CARE OF THINGS AT HOME, OR GET ALONG WITH OTHER PEOPLE: NOT DIFFICULT AT ALL
SUM OF ALL RESPONSES TO PHQ QUESTIONS 1-9: 0

## 2024-03-18 ASSESSMENT — ENCOUNTER SYMPTOMS: RESPIRATORY NEGATIVE: 1

## 2024-03-18 NOTE — PROGRESS NOTES
FOLLOW UP VISIT    Subjective:    Viv Kennedy (: 1945) is a 79 y.o., female,   Chief Complaint   Patient presents with   • New Patient       HPI:  HPI    79 year old In to est care  Depression on meds  Peripheral Neuropathy - on gabapentin   Headaches  PAF s/p ablation on BB  Anxiety  Benign tremor familial   Osteopenia  HCM : Mammo  scheduled  DEXA due    COLO 2019 no longer     PAP   Vaccines due  The following portions of the patient's history were reviewed and updated as appropriate:      Past Medical History:   Diagnosis Date   • A-fib (HCC) 2012   • Abnormal Pap smear of cervix    • Acute anemia     unknown bleed   • Arthritis    • Atrial fibrillation (HCC) 10 years ago   • Dyslipidemia 2013   • GERD (gastroesophageal reflux disease)    • Heart failure (HCC)     a fib   • History of blood transfusion    • History of pulmonary embolism     after hysterectomy   • Hypercholesterolemia    • Insomnia 2015   • Menopause    • CHERYL (obstructive sleep apnea) 6/15/2012   • Osteoarthritis 5 years ago   • PAF (paroxysmal atrial fibrillation) (HCC) 6/15/2012   • PE (pulmonary thromboembolism) (HCC)     age 39 years after hysterectomy   • Persistent atrial fibrillation (HCC) 2014   • Typical atrial flutter (HCC)        Past Surgical History:   Procedure Laterality Date   • ABLATION OF DYSRHYTHMIC FOCUS  2014    Heart Ablation   • CATARACT EXTRACTION     •  SECTION      x2   • CHOLECYSTECTOMY     • COLONOSCOPY     • CYST REMOVAL     • HAMMER TOE SURGERY Right 2022    right second, third, fourth PIP(proximal interphalangeal) resection arthroplasties   28285x3 performed by Manjinder Bruner III, MD at [facility]   • HYSTERECTOMY (CERVIX STATUS UNKNOWN)     • HYSTERECTOMY, TOTAL ABDOMINAL (CERVIX REMOVED)  when I was 39 or 40   • ORTHOPEDIC SURGERY      lt ganglion cyst removal   • OSTEOTOMY Right 2022    right second, third MTP (metatarsophalangeal)  Weil osteotomies

## 2024-04-17 ENCOUNTER — HOSPITAL ENCOUNTER (OUTPATIENT)
Dept: MAMMOGRAPHY | Age: 79
Discharge: HOME | End: 2024-04-20
Attending: INTERNAL MEDICINE
Payer: MEDICARE

## 2024-04-17 DIAGNOSIS — Z12.31 ENCOUNTER FOR SCREENING MAMMOGRAM FOR MALIGNANT NEOPLASM OF BREAST: ICD-10-CM

## 2024-04-17 PROCEDURE — 77063 BREAST TOMOSYNTHESIS BI: CPT

## 2024-04-25 ENCOUNTER — OFFICE VISIT (OUTPATIENT)
Dept: ORTHOPEDIC SURGERY | Age: 79
End: 2024-04-25
Payer: MEDICARE

## 2024-04-25 DIAGNOSIS — M17.0 PRIMARY OSTEOARTHRITIS OF BOTH KNEES: Primary | ICD-10-CM

## 2024-04-25 PROCEDURE — 1090F PRES/ABSN URINE INCON ASSESS: CPT | Performed by: ORTHOPAEDIC SURGERY

## 2024-04-25 PROCEDURE — 1036F TOBACCO NON-USER: CPT | Performed by: ORTHOPAEDIC SURGERY

## 2024-04-25 PROCEDURE — 1123F ACP DISCUSS/DSCN MKR DOCD: CPT | Performed by: ORTHOPAEDIC SURGERY

## 2024-04-25 PROCEDURE — G8417 CALC BMI ABV UP PARAM F/U: HCPCS | Performed by: ORTHOPAEDIC SURGERY

## 2024-04-25 PROCEDURE — G8428 CUR MEDS NOT DOCUMENT: HCPCS | Performed by: ORTHOPAEDIC SURGERY

## 2024-04-25 PROCEDURE — 20610 DRAIN/INJ JOINT/BURSA W/O US: CPT | Performed by: ORTHOPAEDIC SURGERY

## 2024-04-25 PROCEDURE — 99214 OFFICE O/P EST MOD 30 MIN: CPT | Performed by: ORTHOPAEDIC SURGERY

## 2024-04-25 PROCEDURE — G8399 PT W/DXA RESULTS DOCUMENT: HCPCS | Performed by: ORTHOPAEDIC SURGERY

## 2024-04-25 RX ORDER — METHYLPREDNISOLONE ACETATE 40 MG/ML
40 INJECTION, SUSPENSION INTRA-ARTICULAR; INTRALESIONAL; INTRAMUSCULAR; SOFT TISSUE ONCE
Status: COMPLETED | OUTPATIENT
Start: 2024-04-25 | End: 2024-04-25

## 2024-04-25 RX ADMIN — METHYLPREDNISOLONE ACETATE 40 MG: 40 INJECTION, SUSPENSION INTRA-ARTICULAR; INTRALESIONAL; INTRAMUSCULAR; SOFT TISSUE at 09:18

## 2024-04-25 NOTE — PROGRESS NOTES
Patient ID:  Viv Kennedy  [id number]  79 y.o.  1945    Today: April 25, 2024          Chief Complaint:  Bilateral Knee pain    HPI:       Viv Kennedy is a 79 y.o. female  that presents today for followup of bilateral knee pain. The patient has previously been evaluated and treated for this problem. The patient complains of knee pain with activities, reports stiffness of the knee with prolonged inactivity, and swelling/pain at the end of the day and after increased physical activity.  Pain is described as a general ache with occasional sharp pain, primarily along the joint lines. The pain is rated as ranging from 3-8 with periods of acute exacerbation and periods with less severe pain. Generally, symptoms improve with sitting/rest. The pain affects the patient’s activities of daily living and quality of life. Patient reports progressive pain and instability in the knee.     Patient has attempted prior conservative treatment including over-the-counter medications and activity modification. Patient has previously had an intra-articular steroid injection which at one point has provided 3+ months of pain relief.    Past Medical History:  Past Medical History:   Diagnosis Date    A-fib ([facility]) 7/24/2012    Abnormal Pap smear of cervix     Acute anemia     unknown bleed    Arthritis     Atrial fibrillation ([facility]) 10 years ago    Dyslipidemia 4/13/2013    GERD (gastroesophageal reflux disease)     Heart failure ([facility])     a fib    History of blood transfusion     History of pulmonary embolism     after hysterectomy    Hypercholesterolemia     Insomnia 11/17/2015    Menopause     CHERYL (obstructive sleep apnea) 6/15/2012    Osteoarthritis 5 years ago    PAF (paroxysmal atrial fibrillation) ([facility]) 6/15/2012    PE (pulmonary thromboembolism) ([facility])     age 39 years after hysterectomy    Persistent atrial fibrillation ([facility]) 5/19/2014    Typical atrial flutter ([facility])        Past Surgical History:  Past

## 2024-05-30 ENCOUNTER — PATIENT MESSAGE (OUTPATIENT)
Age: 79
End: 2024-05-30

## 2024-05-30 RX ORDER — METOPROLOL SUCCINATE 50 MG/1
50 TABLET, EXTENDED RELEASE ORAL 2 TIMES DAILY
Qty: 180 TABLET | Refills: 3 | Status: SHIPPED | OUTPATIENT
Start: 2024-05-30

## 2024-05-30 NOTE — TELEPHONE ENCOUNTER
From: Viv Kennedy  To: Dr. Fabian Mason  Sent: 5/30/2024 9:27 AM EDT  Subject: Refills    I need refills for Eliquis and Metoprolol Succinate 50 Mg.  90 days with 3 refills to [facility].    Also, I will be out of Eliquis in 3 days. Could you send a prescription to [facility] in [place] for the time until I get my mail order from [place]. It usually takes about two weeks to get them.    Thank you.

## 2024-05-30 NOTE — TELEPHONE ENCOUNTER
Requested Prescriptions     Pending Prescriptions Disp Refills    apixaban (ELIQUIS) 5 MG TABS tablet 180 tablet 3     Sig: Take 1 tablet by mouth 2 times daily    metoprolol succinate (TOPROL XL) 50 MG extended release tablet 180 tablet 3     Sig: Take 1 tablet by mouth 2 times daily     Verified rx. Last OV 08/07/23. Erx to pharm on file.

## 2024-07-09 ENCOUNTER — TELEPHONE (OUTPATIENT)
Dept: ORTHOPEDIC SURGERY | Age: 79
End: 2024-07-09

## 2024-07-31 ENCOUNTER — OFFICE VISIT (OUTPATIENT)
Age: 79
End: 2024-07-31
Payer: MEDICARE

## 2024-07-31 VITALS
DIASTOLIC BLOOD PRESSURE: 64 MMHG | HEIGHT: 66 IN | WEIGHT: 155.9 LBS | BODY MASS INDEX: 25.05 KG/M2 | SYSTOLIC BLOOD PRESSURE: 118 MMHG | HEART RATE: 68 BPM

## 2024-07-31 DIAGNOSIS — I48.0 PAROXYSMAL ATRIAL FIBRILLATION (HCC): Primary | ICD-10-CM

## 2024-07-31 DIAGNOSIS — G47.33 OSA (OBSTRUCTIVE SLEEP APNEA): ICD-10-CM

## 2024-07-31 DIAGNOSIS — M17.0 PRIMARY OSTEOARTHRITIS OF BOTH KNEES: Primary | ICD-10-CM

## 2024-07-31 DIAGNOSIS — Z76.89 ENCOUNTER TO ESTABLISH CARE: ICD-10-CM

## 2024-07-31 DIAGNOSIS — E78.5 DYSLIPIDEMIA: ICD-10-CM

## 2024-07-31 PROCEDURE — 1036F TOBACCO NON-USER: CPT | Performed by: INTERNAL MEDICINE

## 2024-07-31 PROCEDURE — G8427 DOCREV CUR MEDS BY ELIG CLIN: HCPCS | Performed by: INTERNAL MEDICINE

## 2024-07-31 PROCEDURE — 1123F ACP DISCUSS/DSCN MKR DOCD: CPT | Performed by: INTERNAL MEDICINE

## 2024-07-31 PROCEDURE — G8417 CALC BMI ABV UP PARAM F/U: HCPCS | Performed by: INTERNAL MEDICINE

## 2024-07-31 PROCEDURE — G8399 PT W/DXA RESULTS DOCUMENT: HCPCS | Performed by: INTERNAL MEDICINE

## 2024-07-31 PROCEDURE — 99214 OFFICE O/P EST MOD 30 MIN: CPT | Performed by: INTERNAL MEDICINE

## 2024-07-31 PROCEDURE — 93000 ELECTROCARDIOGRAM COMPLETE: CPT | Performed by: INTERNAL MEDICINE

## 2024-07-31 PROCEDURE — 1090F PRES/ABSN URINE INCON ASSESS: CPT | Performed by: INTERNAL MEDICINE

## 2024-08-06 ENCOUNTER — OFFICE VISIT (OUTPATIENT)
Dept: ORTHOPEDIC SURGERY | Age: 79
End: 2024-08-06
Payer: MEDICARE

## 2024-08-06 DIAGNOSIS — M25.561 PAIN IN BOTH KNEES, UNSPECIFIED CHRONICITY: Primary | ICD-10-CM

## 2024-08-06 DIAGNOSIS — M17.0 PRIMARY OSTEOARTHRITIS OF BOTH KNEES: ICD-10-CM

## 2024-08-06 DIAGNOSIS — M25.562 PAIN IN BOTH KNEES, UNSPECIFIED CHRONICITY: Primary | ICD-10-CM

## 2024-08-06 PROCEDURE — 1036F TOBACCO NON-USER: CPT | Performed by: ORTHOPAEDIC SURGERY

## 2024-08-06 PROCEDURE — G8399 PT W/DXA RESULTS DOCUMENT: HCPCS | Performed by: ORTHOPAEDIC SURGERY

## 2024-08-06 PROCEDURE — 1090F PRES/ABSN URINE INCON ASSESS: CPT | Performed by: ORTHOPAEDIC SURGERY

## 2024-08-06 PROCEDURE — 1123F ACP DISCUSS/DSCN MKR DOCD: CPT | Performed by: ORTHOPAEDIC SURGERY

## 2024-08-06 PROCEDURE — G8417 CALC BMI ABV UP PARAM F/U: HCPCS | Performed by: ORTHOPAEDIC SURGERY

## 2024-08-06 PROCEDURE — 20610 DRAIN/INJ JOINT/BURSA W/O US: CPT | Performed by: ORTHOPAEDIC SURGERY

## 2024-08-06 PROCEDURE — 99214 OFFICE O/P EST MOD 30 MIN: CPT | Performed by: ORTHOPAEDIC SURGERY

## 2024-08-06 PROCEDURE — G8428 CUR MEDS NOT DOCUMENT: HCPCS | Performed by: ORTHOPAEDIC SURGERY

## 2024-08-06 RX ORDER — METHYLPREDNISOLONE ACETATE 40 MG/ML
40 INJECTION, SUSPENSION INTRA-ARTICULAR; INTRALESIONAL; INTRAMUSCULAR; SOFT TISSUE ONCE
Status: COMPLETED | OUTPATIENT
Start: 2024-08-06 | End: 2024-08-06

## 2024-08-06 RX ORDER — HYALURONATE SODIUM 10 MG/ML
20 SYRINGE (ML) INTRAARTICULAR ONCE
Status: COMPLETED | OUTPATIENT
Start: 2024-08-06 | End: 2024-08-06

## 2024-08-06 RX ADMIN — Medication 20 MG: at 15:48

## 2024-08-06 RX ADMIN — METHYLPREDNISOLONE ACETATE 40 MG: 40 INJECTION, SUSPENSION INTRA-ARTICULAR; INTRALESIONAL; INTRAMUSCULAR; SOFT TISSUE at 15:48

## 2024-08-06 NOTE — PROGRESS NOTES
Patient ID:  Viv Kennedy  [id number]  79 y.o.  1945    Today: August 6, 2024          Chief Complaint:  Bilateral Knee pain    HPI:       Viv Kennedy is a 79 y.o. female  that presents today for evaluation and treatment of bilateral knee pain. Patient reports onset of pain some time ago but reports pain recently became more significant. The patient complains of knee pain with activities, reports stiffness of the knee with prolonged inactivity, and swelling/pain at the end of the day and after increased physical activity.  Pain is described as a general ache with occasional sharp pain, primarily along the joint lines. They rate the pain ranging from 3-8 depending on the day and activity.  Generally, symptoms improve with sitting/rest.  The pain affects the patient’s activities of daily living and quality of life. Patient reports progressive pain and instability in the knee.   Injury history: No    Patient has attempted prior conservative treatment including oral medications, activity modification +/- physical therapy and prior steroid injection which only provided short term pain relief.    Past Medical History:  Past Medical History:   Diagnosis Date    A-fib (HCC) 7/24/2012    Abnormal Pap smear of cervix     Acute anemia     unknown bleed    Arthritis     Atrial fibrillation ([facility]) 10 years ago    Dyslipidemia 4/13/2013    GERD (gastroesophageal reflux disease)     Heart failure ([facility])     a fib    History of blood transfusion     History of pulmonary embolism     after hysterectomy    Hypercholesterolemia     Insomnia 11/17/2015    Menopause     CHERYL (obstructive sleep apnea) 6/15/2012    Osteoarthritis 5 years ago    PAF (paroxysmal atrial fibrillation) ([facility]) 6/15/2012    PE (pulmonary thromboembolism) ([facility])     age 39 years after hysterectomy    Persistent atrial fibrillation ([facility]) 5/19/2014    Typical atrial flutter ([facility])        Past Surgical History:  Past Surgical History:

## 2024-08-13 ENCOUNTER — OFFICE VISIT (OUTPATIENT)
Dept: ORTHOPEDIC SURGERY | Age: 79
End: 2024-08-13
Payer: MEDICARE

## 2024-08-13 DIAGNOSIS — M17.0 PRIMARY OSTEOARTHRITIS OF BOTH KNEES: Primary | ICD-10-CM

## 2024-08-13 PROCEDURE — 20611 DRAIN/INJ JOINT/BURSA W/US: CPT | Performed by: NURSE PRACTITIONER

## 2024-08-13 RX ORDER — HYALURONATE SODIUM 10 MG/ML
20 SYRINGE (ML) INTRAARTICULAR ONCE
Status: COMPLETED | OUTPATIENT
Start: 2024-08-13 | End: 2024-08-13

## 2024-08-13 RX ADMIN — Medication 20 MG: at 09:53

## 2024-08-13 NOTE — PROGRESS NOTES
Diagnosis: Bilateral knee arthritis    Patient present today for the 2nd viscosupplimentation injection in the knees. Prior injections have been tolerated well.    Risks, benefits, and alteratives were discussed with patient prior to injection.  A timeout was performed which included identifying the patient by name and date of birth, verifying correct procedure and correct site(s).     Under sterile technique, the knees were injected with Euflexxa (2ml).  The patient tolerated the procedure well.    Patient is advised to ice the knee over the next week or so.  Continue taking prior oral medications. Patient will follow-up as directed for the final injection in the series.      MARIO LEAL, APRN - CNP

## 2024-08-20 ENCOUNTER — OFFICE VISIT (OUTPATIENT)
Dept: ORTHOPEDIC SURGERY | Age: 79
End: 2024-08-20
Payer: MEDICARE

## 2024-08-20 DIAGNOSIS — M17.0 PRIMARY OSTEOARTHRITIS OF BOTH KNEES: Primary | ICD-10-CM

## 2024-08-20 PROCEDURE — 20610 DRAIN/INJ JOINT/BURSA W/O US: CPT | Performed by: ORTHOPAEDIC SURGERY

## 2024-08-20 RX ORDER — HYALURONATE SODIUM 10 MG/ML
20 SYRINGE (ML) INTRAARTICULAR ONCE
Status: COMPLETED | OUTPATIENT
Start: 2024-08-20 | End: 2024-08-20

## 2024-08-20 RX ADMIN — Medication 20 MG: at 15:11

## 2024-08-20 NOTE — PROGRESS NOTES
Diagnosis: Bilateral knee arthritis    Patient present today for the third and final viscosupplimentation injection in the knees. Prior injections have been tolerated well.    Risks, benefits, and alteratives were discussed with patient prior to injection.  A timeout was performed which included identifying the patient by name and date of birth, verifying correct procedure and correct site(s).     Under sterile technique, the knees were injected with Euflexxa (2ml).  The patient tolerated the procedure well.    This is the third and final injection of the series.  Again the patient is advised to ice the knee over the next week or so.  Continue taking prior oral medications.   The patient can follow-up with me as needed.

## 2024-09-18 ENCOUNTER — OFFICE VISIT (OUTPATIENT)
Dept: PRIMARY CARE CLINIC | Facility: CLINIC | Age: 79
End: 2024-09-18
Payer: MEDICARE

## 2024-09-18 VITALS
WEIGHT: 153.2 LBS | HEART RATE: 96 BPM | SYSTOLIC BLOOD PRESSURE: 102 MMHG | DIASTOLIC BLOOD PRESSURE: 62 MMHG | BODY MASS INDEX: 24.62 KG/M2 | OXYGEN SATURATION: 99 % | HEIGHT: 66 IN

## 2024-09-18 DIAGNOSIS — K21.9 GASTROESOPHAGEAL REFLUX DISEASE WITHOUT ESOPHAGITIS: ICD-10-CM

## 2024-09-18 DIAGNOSIS — R51.9 NONINTRACTABLE EPISODIC HEADACHE, UNSPECIFIED HEADACHE TYPE: ICD-10-CM

## 2024-09-18 DIAGNOSIS — J31.0 NON-ALLERGIC RHINITIS: ICD-10-CM

## 2024-09-18 DIAGNOSIS — R73.9 HIGH BLOOD SUGAR: ICD-10-CM

## 2024-09-18 DIAGNOSIS — F32.0 MAJOR DEPRESSIVE DISORDER, SINGLE EPISODE, MILD (HCC): ICD-10-CM

## 2024-09-18 DIAGNOSIS — E78.5 DYSLIPIDEMIA: ICD-10-CM

## 2024-09-18 DIAGNOSIS — D89.2 HYPERGAMMAGLOBULINEMIA: Primary | ICD-10-CM

## 2024-09-18 DIAGNOSIS — G60.9 IDIOPATHIC PERIPHERAL NEUROPATHY: ICD-10-CM

## 2024-09-18 DIAGNOSIS — I48.19 PERSISTENT ATRIAL FIBRILLATION (HCC): ICD-10-CM

## 2024-09-18 DIAGNOSIS — G47.33 OSA (OBSTRUCTIVE SLEEP APNEA): ICD-10-CM

## 2024-09-18 DIAGNOSIS — G62.9 NEUROPATHY: ICD-10-CM

## 2024-09-18 DIAGNOSIS — E55.9 VITAMIN D DEFICIENCY: ICD-10-CM

## 2024-09-18 DIAGNOSIS — I48.0 PAROXYSMAL ATRIAL FIBRILLATION (HCC): ICD-10-CM

## 2024-09-18 LAB
ALBUMIN SERPL-MCNC: 3.8 G/DL (ref 3.2–4.6)
ALBUMIN/GLOB SERPL: 1.3 (ref 1–1.9)
ALP SERPL-CCNC: 67 U/L (ref 35–104)
ALT SERPL-CCNC: 24 U/L (ref 12–65)
ANION GAP SERPL CALC-SCNC: 9 MMOL/L (ref 9–18)
AST SERPL-CCNC: 32 U/L (ref 15–37)
BASOPHILS # BLD: 0 K/UL (ref 0–0.2)
BASOPHILS NFR BLD: 0 % (ref 0–2)
BILIRUB SERPL-MCNC: 0.4 MG/DL (ref 0–1.2)
BUN SERPL-MCNC: 20 MG/DL (ref 8–23)
CALCIUM SERPL-MCNC: 9.6 MG/DL (ref 8.8–10.2)
CHLORIDE SERPL-SCNC: 103 MMOL/L (ref 98–107)
CHOLEST SERPL-MCNC: 227 MG/DL (ref 0–200)
CO2 SERPL-SCNC: 27 MMOL/L (ref 20–28)
CREAT SERPL-MCNC: 0.74 MG/DL (ref 0.6–1.1)
DIFFERENTIAL METHOD BLD: ABNORMAL
EOSINOPHIL # BLD: 0.1 K/UL (ref 0–0.8)
EOSINOPHIL NFR BLD: 1 % (ref 0.5–7.8)
ERYTHROCYTE [DISTWIDTH] IN BLOOD BY AUTOMATED COUNT: 12.9 % (ref 11.9–14.6)
EST. AVERAGE GLUCOSE BLD GHB EST-MCNC: 108 MG/DL
GLOBULIN SER CALC-MCNC: 2.9 G/DL (ref 2.3–3.5)
GLUCOSE SERPL-MCNC: 83 MG/DL (ref 70–99)
HBA1C MFR BLD: 5.4 % (ref 0–5.6)
HCT VFR BLD AUTO: 40 % (ref 35.8–46.3)
HDLC SERPL-MCNC: 38 MG/DL (ref 40–60)
HDLC SERPL: 6 (ref 0–5)
HGB BLD-MCNC: 12.6 G/DL (ref 11.7–15.4)
IMM GRANULOCYTES # BLD AUTO: 0 K/UL (ref 0–0.5)
IMM GRANULOCYTES NFR BLD AUTO: 0 % (ref 0–5)
LDLC SERPL CALC-MCNC: 161 MG/DL (ref 0–100)
LYMPHOCYTES # BLD: 1.7 K/UL (ref 0.5–4.6)
LYMPHOCYTES NFR BLD: 24 % (ref 13–44)
MCH RBC QN AUTO: 31.6 PG (ref 26.1–32.9)
MCHC RBC AUTO-ENTMCNC: 31.5 G/DL (ref 31.4–35)
MCV RBC AUTO: 100.3 FL (ref 82–102)
MONOCYTES # BLD: 0.6 K/UL (ref 0.1–1.3)
MONOCYTES NFR BLD: 9 % (ref 4–12)
NEUTS SEG # BLD: 4.4 K/UL (ref 1.7–8.2)
NEUTS SEG NFR BLD: 66 % (ref 43–78)
NRBC # BLD: 0 K/UL (ref 0–0.2)
PLATELET # BLD AUTO: 203 K/UL (ref 150–450)
PMV BLD AUTO: 10.2 FL (ref 9.4–12.3)
POTASSIUM SERPL-SCNC: 4.4 MMOL/L (ref 3.5–5.1)
PROT SERPL-MCNC: 6.7 G/DL (ref 6.3–8.2)
RBC # BLD AUTO: 3.99 M/UL (ref 4.05–5.2)
SODIUM SERPL-SCNC: 140 MMOL/L (ref 136–145)
TRIGL SERPL-MCNC: 140 MG/DL (ref 0–150)
TSH, 3RD GENERATION: 2.27 UIU/ML (ref 0.27–4.2)
VLDLC SERPL CALC-MCNC: 28 MG/DL (ref 6–23)
WBC # BLD AUTO: 6.8 K/UL (ref 4.3–11.1)

## 2024-09-18 PROCEDURE — 1123F ACP DISCUSS/DSCN MKR DOCD: CPT | Performed by: INTERNAL MEDICINE

## 2024-09-18 PROCEDURE — G8420 CALC BMI NORM PARAMETERS: HCPCS | Performed by: INTERNAL MEDICINE

## 2024-09-18 PROCEDURE — G2211 COMPLEX E/M VISIT ADD ON: HCPCS | Performed by: INTERNAL MEDICINE

## 2024-09-18 PROCEDURE — 99215 OFFICE O/P EST HI 40 MIN: CPT | Performed by: INTERNAL MEDICINE

## 2024-09-18 PROCEDURE — G8399 PT W/DXA RESULTS DOCUMENT: HCPCS | Performed by: INTERNAL MEDICINE

## 2024-09-18 PROCEDURE — G8427 DOCREV CUR MEDS BY ELIG CLIN: HCPCS | Performed by: INTERNAL MEDICINE

## 2024-09-18 PROCEDURE — 1036F TOBACCO NON-USER: CPT | Performed by: INTERNAL MEDICINE

## 2024-09-18 PROCEDURE — 1090F PRES/ABSN URINE INCON ASSESS: CPT | Performed by: INTERNAL MEDICINE

## 2024-09-18 RX ORDER — SERTRALINE HYDROCHLORIDE 100 MG/1
100 TABLET, FILM COATED ORAL NIGHTLY
Qty: 90 TABLET | Refills: 1 | Status: SHIPPED | OUTPATIENT
Start: 2024-09-18

## 2024-09-18 RX ORDER — SERTRALINE HYDROCHLORIDE 100 MG/1
100 TABLET, FILM COATED ORAL NIGHTLY
Qty: 90 TABLET | Refills: 1 | Status: CANCELLED | OUTPATIENT
Start: 2024-09-18

## 2024-09-18 RX ORDER — GABAPENTIN 300 MG/1
300 CAPSULE ORAL 2 TIMES DAILY
Qty: 180 CAPSULE | Refills: 1 | Status: SHIPPED | OUTPATIENT
Start: 2024-09-18 | End: 2025-09-18

## 2024-09-18 RX ORDER — ESOMEPRAZOLE MAGNESIUM 40 MG/1
40 CAPSULE, DELAYED RELEASE ORAL
Qty: 90 CAPSULE | Refills: 1 | Status: CANCELLED | OUTPATIENT
Start: 2024-09-18

## 2024-09-18 RX ORDER — ESOMEPRAZOLE MAGNESIUM 40 MG/1
40 CAPSULE, DELAYED RELEASE ORAL
Qty: 90 CAPSULE | Refills: 1 | Status: SHIPPED | OUTPATIENT
Start: 2024-09-18

## 2024-09-18 RX ORDER — LIDOCAINE 50 MG/G
OINTMENT TOPICAL
Qty: 240 G | Refills: 1 | Status: SHIPPED | OUTPATIENT
Start: 2024-09-18

## 2024-09-18 RX ORDER — GABAPENTIN 300 MG/1
300 CAPSULE ORAL 3 TIMES DAILY
Qty: 270 CAPSULE | Refills: 1 | Status: CANCELLED | OUTPATIENT
Start: 2024-09-18 | End: 2025-09-18

## 2024-09-18 RX ORDER — FLUTICASONE PROPIONATE 50 MCG
2 SPRAY, SUSPENSION (ML) NASAL DAILY
Qty: 16 G | Refills: 5 | Status: SHIPPED | OUTPATIENT
Start: 2024-09-18

## 2024-09-18 ASSESSMENT — ENCOUNTER SYMPTOMS: RESPIRATORY NEGATIVE: 1

## 2024-09-20 ENCOUNTER — TELEPHONE (OUTPATIENT)
Dept: PRIMARY CARE CLINIC | Facility: CLINIC | Age: 79
End: 2024-09-20

## 2024-10-15 ENCOUNTER — TELEMEDICINE (OUTPATIENT)
Dept: PRIMARY CARE CLINIC | Facility: CLINIC | Age: 79
End: 2024-10-15
Payer: MEDICARE

## 2024-10-15 DIAGNOSIS — J01.90 ACUTE SINUSITIS, RECURRENCE NOT SPECIFIED, UNSPECIFIED LOCATION: Primary | ICD-10-CM

## 2024-10-15 PROCEDURE — 1123F ACP DISCUSS/DSCN MKR DOCD: CPT | Performed by: INTERNAL MEDICINE

## 2024-10-15 PROCEDURE — G8427 DOCREV CUR MEDS BY ELIG CLIN: HCPCS | Performed by: INTERNAL MEDICINE

## 2024-10-15 PROCEDURE — G8420 CALC BMI NORM PARAMETERS: HCPCS | Performed by: INTERNAL MEDICINE

## 2024-10-15 PROCEDURE — 1036F TOBACCO NON-USER: CPT | Performed by: INTERNAL MEDICINE

## 2024-10-15 PROCEDURE — G8484 FLU IMMUNIZE NO ADMIN: HCPCS | Performed by: INTERNAL MEDICINE

## 2024-10-15 PROCEDURE — 99214 OFFICE O/P EST MOD 30 MIN: CPT | Performed by: INTERNAL MEDICINE

## 2024-10-15 PROCEDURE — 1090F PRES/ABSN URINE INCON ASSESS: CPT | Performed by: INTERNAL MEDICINE

## 2024-10-15 PROCEDURE — G8399 PT W/DXA RESULTS DOCUMENT: HCPCS | Performed by: INTERNAL MEDICINE

## 2024-10-15 RX ORDER — PREDNISONE 20 MG/1
TABLET ORAL
Qty: 7 TABLET | Refills: 0 | Status: SHIPPED | OUTPATIENT
Start: 2024-10-15

## 2024-10-15 ASSESSMENT — ENCOUNTER SYMPTOMS
SINUS COMPLAINT: 1
COUGH: 1
SWOLLEN GLANDS: 1
SORE THROAT: 1
SINUS PRESSURE: 1
HOARSE VOICE: 1

## 2024-10-15 NOTE — PROGRESS NOTES
ganglion cyst removal    OSTEOTOMY Right 07/26/2022    right second, third MTP (metatarsophalangeal)  Weil osteotomies    performed by Manjinder Bruner III, MD at [facility]    OTHER SURGICAL HISTORY      laser surgery for varicose veins    TONSILLECTOMY      UPPER GASTROINTESTINAL ENDOSCOPY  5 years ago       Family History   Problem Relation Age of Onset    Stroke Sister     Heart Disease Sister     Atrial Fibrillation Sister     Heart Disease Father     Cancer Father         Colon    Heart Disease Paternal Aunt     Breast Cancer Paternal Aunt 60        60 PLUS YRS    Heart Disease Paternal Uncle     Alzheimer's Disease Mother     Allergy (Severe) Brother        Social History     Socioeconomic History    Marital status:      Spouse name: Not on file    Number of children: Not on file    Years of education: Not on file    Highest education level: Not on file   Occupational History    Not on file   Tobacco Use    Smoking status: Never    Smokeless tobacco: Never   Vaping Use    Vaping status: Never Used   Substance and Sexual Activity    Alcohol use: No    Drug use: No    Sexual activity: Not Currently     Partners: Male   Other Topics Concern    Not on file   Social History Narrative    No history of physical or sexual abuse feels safe at home     Social Determinants of Health     Financial Resource Strain: Low Risk  (3/18/2024)    Overall Financial Resource Strain (CARDIA)     Difficulty of Paying Living Expenses: Not hard at all   Food Insecurity: No Food Insecurity (3/18/2024)    Hunger Vital Sign     Worried About Running Out of Food in the Last Year: Never true     Ran Out of Food in the Last Year: Never true   Transportation Needs: Unknown (3/18/2024)    PRAPARE - Transportation     Lack of Transportation (Medical): Not on file     Lack of Transportation (Non-Medical): No   Physical Activity: Inactive (9/15/2023)    Exercise Vital Sign     Days of Exercise per Week: 0 days     Minutes of Exercise per

## 2024-11-08 ENCOUNTER — TELEPHONE (OUTPATIENT)
Dept: PRIMARY CARE CLINIC | Facility: CLINIC | Age: 79
End: 2024-11-08

## 2024-11-08 NOTE — TELEPHONE ENCOUNTER
----- Message from Acosta ROSA sent at 11/8/2024 10:24 AM EST -----  Regarding: ECC Appointment Request  ECC Appointment Request    Patient needs appointment for ECC Appointment Type: Annual Visit.    Patient Requested Dates(s):Any day except Thursday  Patient Requested Time: Late morning  Provider Name: Laurie Sung MD    Reason for Appointment Request: Established Patient - Available appointments did not meet patient need  Patient called in to book a Virtual Visit appointment for her Medicare Annual Well Visit.   --------------------------------------------------------------------------------------------------------------------------    Relationship to Patient: Self     Call Back Information: OK to leave message on voicemail  Preferred Call Back Number: Phone  [phone]

## 2024-11-12 ENCOUNTER — OFFICE VISIT (OUTPATIENT)
Dept: ORTHOPEDIC SURGERY | Age: 79
End: 2024-11-12
Payer: MEDICARE

## 2024-11-12 DIAGNOSIS — M17.0 PRIMARY OSTEOARTHRITIS OF BOTH KNEES: Primary | ICD-10-CM

## 2024-11-12 PROCEDURE — 1123F ACP DISCUSS/DSCN MKR DOCD: CPT | Performed by: ORTHOPAEDIC SURGERY

## 2024-11-12 PROCEDURE — G8484 FLU IMMUNIZE NO ADMIN: HCPCS | Performed by: ORTHOPAEDIC SURGERY

## 2024-11-12 PROCEDURE — G8420 CALC BMI NORM PARAMETERS: HCPCS | Performed by: ORTHOPAEDIC SURGERY

## 2024-11-12 PROCEDURE — G8399 PT W/DXA RESULTS DOCUMENT: HCPCS | Performed by: ORTHOPAEDIC SURGERY

## 2024-11-12 PROCEDURE — 1036F TOBACCO NON-USER: CPT | Performed by: ORTHOPAEDIC SURGERY

## 2024-11-12 PROCEDURE — G8428 CUR MEDS NOT DOCUMENT: HCPCS | Performed by: ORTHOPAEDIC SURGERY

## 2024-11-12 PROCEDURE — 20610 DRAIN/INJ JOINT/BURSA W/O US: CPT | Performed by: ORTHOPAEDIC SURGERY

## 2024-11-12 PROCEDURE — 1090F PRES/ABSN URINE INCON ASSESS: CPT | Performed by: ORTHOPAEDIC SURGERY

## 2024-11-12 PROCEDURE — 99214 OFFICE O/P EST MOD 30 MIN: CPT | Performed by: ORTHOPAEDIC SURGERY

## 2024-11-12 RX ORDER — METHYLPREDNISOLONE ACETATE 40 MG/ML
40 INJECTION, SUSPENSION INTRA-ARTICULAR; INTRALESIONAL; INTRAMUSCULAR; SOFT TISSUE ONCE
Status: COMPLETED | OUTPATIENT
Start: 2024-11-12 | End: 2024-11-12

## 2024-11-12 RX ADMIN — METHYLPREDNISOLONE ACETATE 40 MG: 40 INJECTION, SUSPENSION INTRA-ARTICULAR; INTRALESIONAL; INTRAMUSCULAR; SOFT TISSUE at 14:38

## 2024-11-12 NOTE — PROGRESS NOTES
Patient ID:  Viv Kennedy  [id number]  79 y.o.  1945    Today: November 12, 2024          Chief Complaint:  Bilateral Knee pain    HPI:       Viv Kennedy is a 79 y.o. female  that presents today for followup of bilateral knee pain. The patient has previously been evaluated and treated for this problem. The patient complains of knee pain with activities, reports stiffness of the knee with prolonged inactivity, and swelling/pain at the end of the day and after increased physical activity.  Pain is described as a general ache with occasional sharp pain, primarily along the joint lines. The pain is rated as ranging from 3-8 with periods of acute exacerbation and periods with less severe pain. Generally, symptoms improve with sitting/rest. The pain affects the patient’s activities of daily living and quality of life. Patient reports progressive pain and instability in the knee.     Patient has attempted prior conservative treatment including over-the-counter medications and activity modification. Patient has previously had an intra-articular steroid injection which at one point has provided 3+ months of pain relief.    Past Medical History:  Past Medical History:   Diagnosis Date    A-fib ([facility]) 7/24/2012    Abnormal Pap smear of cervix     Acute anemia     unknown bleed    Arthritis     Atrial fibrillation ([facility]) 10 years ago    Dyslipidemia 4/13/2013    GERD (gastroesophageal reflux disease)     Heart failure ([facility])     a fib    History of blood transfusion     History of pulmonary embolism     after hysterectomy    Hypercholesterolemia     Insomnia 11/17/2015    Menopause     CHERYL (obstructive sleep apnea) 6/15/2012    Osteoarthritis 5 years ago    PAF (paroxysmal atrial fibrillation) ([facility]) 6/15/2012    PE (pulmonary thromboembolism) ([facility])     age 39 years after hysterectomy    Persistent atrial fibrillation ([facility]) 5/19/2014    Typical atrial flutter ([facility])        Past Surgical

## 2024-11-14 NOTE — PROGRESS NOTES
Take 1 tablet by mouth 2 times daily  Fabian Mason MD   Multiple Vitamins-Minerals (CENTRUM ADULTS PO) Take 1 tablet by mouth Daily  Provider, MD Erika   magnesium oxide (MAG-OX) 400 MG tablet Take 1 tablet by mouth daily  Automatic ReconciliationJohannam (Including outside providers/suppliers regularly involved in providing care):   Patient Care Team:  Laurie Sung MD as PCP - General (Internal Medicine)  Laurie Sung MD as PCP - Empaneled Provider      Reviewed and updated this visit:           Viv LangeMichael Ovidioshari, was evaluated through a synchronous (real-time) audio-video encounter. The patient (or guardian if applicable) is aware that this is a billable service, which includes applicable co-pays. This Virtual Visit was conducted with patient's (and/or legal guardian's) consent. Patient identification was verified, and a caregiver was present when appropriate.   The patient was located at Home: [address] [place] [id number]  Provider was located at Facility (Appt Dept): [address] [place] [address]  Confirm you are appropriately licensed, registered, or certified to deliver care in the state where the patient is located as indicated above. If you are not or unsure, please re-schedule the visit: Yes, I confirm.         An electronic signature was used to authenticate this note.

## 2024-11-15 ENCOUNTER — TELEMEDICINE (OUTPATIENT)
Dept: PRIMARY CARE CLINIC | Facility: CLINIC | Age: 79
End: 2024-11-15

## 2024-11-15 DIAGNOSIS — Z00.00 MEDICARE ANNUAL WELLNESS VISIT, SUBSEQUENT: Primary | ICD-10-CM

## 2024-11-15 ASSESSMENT — PATIENT HEALTH QUESTIONNAIRE - PHQ9
10. IF YOU CHECKED OFF ANY PROBLEMS, HOW DIFFICULT HAVE THESE PROBLEMS MADE IT FOR YOU TO DO YOUR WORK, TAKE CARE OF THINGS AT HOME, OR GET ALONG WITH OTHER PEOPLE: NOT DIFFICULT AT ALL
7. TROUBLE CONCENTRATING ON THINGS, SUCH AS READING THE NEWSPAPER OR WATCHING TELEVISION: NOT AT ALL
9. THOUGHTS THAT YOU WOULD BE BETTER OFF DEAD, OR OF HURTING YOURSELF: NOT AT ALL
4. FEELING TIRED OR HAVING LITTLE ENERGY: SEVERAL DAYS
8. MOVING OR SPEAKING SO SLOWLY THAT OTHER PEOPLE COULD HAVE NOTICED. OR THE OPPOSITE, BEING SO FIGETY OR RESTLESS THAT YOU HAVE BEEN MOVING AROUND A LOT MORE THAN USUAL: NOT AT ALL
6. FEELING BAD ABOUT YOURSELF - OR THAT YOU ARE A FAILURE OR HAVE LET YOURSELF OR YOUR FAMILY DOWN: NOT AT ALL
1. LITTLE INTEREST OR PLEASURE IN DOING THINGS: NOT AT ALL
5. POOR APPETITE OR OVEREATING: NOT AT ALL
SUM OF ALL RESPONSES TO PHQ9 QUESTIONS 1 & 2: 0
3. TROUBLE FALLING OR STAYING ASLEEP: NEARLY EVERY DAY
2. FEELING DOWN, DEPRESSED OR HOPELESS: NOT AT ALL
SUM OF ALL RESPONSES TO PHQ QUESTIONS 1-9: 4

## 2024-11-15 ASSESSMENT — LIFESTYLE VARIABLES
HOW OFTEN DO YOU HAVE A DRINK CONTAINING ALCOHOL: NEVER
HOW MANY STANDARD DRINKS CONTAINING ALCOHOL DO YOU HAVE ON A TYPICAL DAY: PATIENT DOES NOT DRINK

## 2025-01-27 ENCOUNTER — TELEPHONE (OUTPATIENT)
Dept: ORTHOPEDIC SURGERY | Age: 80
End: 2025-01-27

## 2025-01-27 DIAGNOSIS — M17.0 PRIMARY OSTEOARTHRITIS OF BOTH KNEES: Primary | ICD-10-CM

## 2025-02-11 ENCOUNTER — OFFICE VISIT (OUTPATIENT)
Age: 80
End: 2025-02-11
Payer: MEDICARE

## 2025-02-11 VITALS
BODY MASS INDEX: 24.43 KG/M2 | DIASTOLIC BLOOD PRESSURE: 62 MMHG | HEIGHT: 66 IN | HEART RATE: 92 BPM | WEIGHT: 152 LBS | SYSTOLIC BLOOD PRESSURE: 106 MMHG

## 2025-02-11 DIAGNOSIS — D68.69 SECONDARY HYPERCOAGULABLE STATE (HCC): ICD-10-CM

## 2025-02-11 DIAGNOSIS — G47.33 OSA (OBSTRUCTIVE SLEEP APNEA): ICD-10-CM

## 2025-02-11 DIAGNOSIS — I48.0 PAROXYSMAL ATRIAL FIBRILLATION (HCC): Primary | ICD-10-CM

## 2025-02-11 DIAGNOSIS — E78.5 DYSLIPIDEMIA: ICD-10-CM

## 2025-02-11 PROCEDURE — G8399 PT W/DXA RESULTS DOCUMENT: HCPCS | Performed by: INTERNAL MEDICINE

## 2025-02-11 PROCEDURE — 1123F ACP DISCUSS/DSCN MKR DOCD: CPT | Performed by: INTERNAL MEDICINE

## 2025-02-11 PROCEDURE — 99214 OFFICE O/P EST MOD 30 MIN: CPT | Performed by: INTERNAL MEDICINE

## 2025-02-11 PROCEDURE — G8427 DOCREV CUR MEDS BY ELIG CLIN: HCPCS | Performed by: INTERNAL MEDICINE

## 2025-02-11 PROCEDURE — 1090F PRES/ABSN URINE INCON ASSESS: CPT | Performed by: INTERNAL MEDICINE

## 2025-02-11 PROCEDURE — G8420 CALC BMI NORM PARAMETERS: HCPCS | Performed by: INTERNAL MEDICINE

## 2025-02-11 PROCEDURE — 1159F MED LIST DOCD IN RCRD: CPT | Performed by: INTERNAL MEDICINE

## 2025-02-11 PROCEDURE — 1036F TOBACCO NON-USER: CPT | Performed by: INTERNAL MEDICINE

## 2025-02-11 PROCEDURE — 1160F RVW MEDS BY RX/DR IN RCRD: CPT | Performed by: INTERNAL MEDICINE

## 2025-02-11 PROCEDURE — 93000 ELECTROCARDIOGRAM COMPLETE: CPT | Performed by: INTERNAL MEDICINE

## 2025-02-11 PROCEDURE — 1126F AMNT PAIN NOTED NONE PRSNT: CPT | Performed by: INTERNAL MEDICINE

## 2025-02-20 DIAGNOSIS — E78.5 DYSLIPIDEMIA: ICD-10-CM

## 2025-02-20 DIAGNOSIS — I48.0 PAROXYSMAL ATRIAL FIBRILLATION (HCC): Primary | ICD-10-CM

## 2025-02-20 NOTE — TELEPHONE ENCOUNTER
----- Message from Dr. Fabian Mason MD sent at 2/19/2025  5:25 PM EST -----  Coronary calcium score 87.  Moderately elevated but intolerant to numerous statins with severe myalgias.  Prescribe Repatha 140 mg every 2 weeks and recheck lipid and liver in 3 months.  ----- Message -----  From: Troy Mc Incoming Orders Results To Radiant  Sent: 2/19/2025   5:24 PM EST  To: Fabian Mason MD

## 2025-02-25 NOTE — TELEPHONE ENCOUNTER
Requested Prescriptions     Pending Prescriptions Disp Refills    Evolocumab 140 MG/ML SOAJ 6 Adjustable Dose Pre-filled Pen Syringe 3     Sig: Inject 1 Adjustable Dose Pre-filled Pen Syringe into the skin every 14 days

## 2025-03-04 ENCOUNTER — OFFICE VISIT (OUTPATIENT)
Dept: ORTHOPEDIC SURGERY | Age: 80
End: 2025-03-04
Payer: MEDICARE

## 2025-03-04 DIAGNOSIS — M17.0 PRIMARY OSTEOARTHRITIS OF BOTH KNEES: Primary | ICD-10-CM

## 2025-03-04 PROCEDURE — 1090F PRES/ABSN URINE INCON ASSESS: CPT | Performed by: ORTHOPAEDIC SURGERY

## 2025-03-04 PROCEDURE — G8399 PT W/DXA RESULTS DOCUMENT: HCPCS | Performed by: ORTHOPAEDIC SURGERY

## 2025-03-04 PROCEDURE — 1036F TOBACCO NON-USER: CPT | Performed by: ORTHOPAEDIC SURGERY

## 2025-03-04 PROCEDURE — 99214 OFFICE O/P EST MOD 30 MIN: CPT | Performed by: ORTHOPAEDIC SURGERY

## 2025-03-04 PROCEDURE — G8428 CUR MEDS NOT DOCUMENT: HCPCS | Performed by: ORTHOPAEDIC SURGERY

## 2025-03-04 PROCEDURE — 1123F ACP DISCUSS/DSCN MKR DOCD: CPT | Performed by: ORTHOPAEDIC SURGERY

## 2025-03-04 PROCEDURE — G8420 CALC BMI NORM PARAMETERS: HCPCS | Performed by: ORTHOPAEDIC SURGERY

## 2025-03-04 PROCEDURE — 20610 DRAIN/INJ JOINT/BURSA W/O US: CPT | Performed by: ORTHOPAEDIC SURGERY

## 2025-03-04 RX ORDER — METHYLPREDNISOLONE ACETATE 40 MG/ML
40 INJECTION, SUSPENSION INTRA-ARTICULAR; INTRALESIONAL; INTRAMUSCULAR; SOFT TISSUE ONCE
Status: COMPLETED | OUTPATIENT
Start: 2025-03-04 | End: 2025-03-04

## 2025-03-04 RX ORDER — HYALURONATE SODIUM 10 MG/ML
20 SYRINGE (ML) INTRAARTICULAR ONCE
Status: COMPLETED | OUTPATIENT
Start: 2025-03-04 | End: 2025-03-04

## 2025-03-04 RX ADMIN — Medication 20 MG: at 15:17

## 2025-03-04 RX ADMIN — METHYLPREDNISOLONE ACETATE 40 MG: 40 INJECTION, SUSPENSION INTRA-ARTICULAR; INTRALESIONAL; INTRAMUSCULAR; SOFT TISSUE at 15:17

## 2025-03-04 NOTE — PROGRESS NOTES
Patient ID:  Viv Kennedy  [id number]  80 y.o.  1945    Today: March 4, 2025          Chief Complaint:  Bilateral Knee pain    HPI:       Viv Kennedy is a 80 y.o. female  that presents today for evaluation and treatment of bilateral knee pain. Patient reports onset of pain some time ago but reports pain recently became more significant. The patient complains of knee pain with activities, reports stiffness of the knee with prolonged inactivity, and swelling/pain at the end of the day and after increased physical activity.  Pain is described as a general ache with occasional sharp pain, primarily along the joint lines. They rate the pain ranging from 3-8 depending on the day and activity.  Generally, symptoms improve with sitting/rest.  The pain affects the patient’s activities of daily living and quality of life. Patient reports progressive pain and instability in the knee.   Injury history: No    Patient has attempted prior conservative treatment including oral medications, activity modification +/- physical therapy and prior steroid injection which only provided short term pain relief.    Past Medical History:  Past Medical History:   Diagnosis Date    A-fib (HCC) 7/24/2012    Abnormal Pap smear of cervix     Acute anemia     unknown bleed    Arthritis     Atrial fibrillation ([facility]) 10 years ago    Dyslipidemia 4/13/2013    GERD (gastroesophageal reflux disease)     Heart failure ([facility])     a fib    History of blood transfusion     History of pulmonary embolism     after hysterectomy    Hypercholesterolemia     Insomnia 11/17/2015    Menopause     CHERYL (obstructive sleep apnea) 6/15/2012    Osteoarthritis 5 years ago    PAF (paroxysmal atrial fibrillation) ([facility]) 6/15/2012    PE (pulmonary thromboembolism) ([facility])     age 39 years after hysterectomy    Persistent atrial fibrillation ([facility]) 5/19/2014    Typical atrial flutter ([facility])        Past Surgical History:  Past Surgical History:

## 2025-03-18 ENCOUNTER — TELEPHONE (OUTPATIENT)
Dept: PRIMARY CARE CLINIC | Facility: CLINIC | Age: 80
End: 2025-03-18

## 2025-03-18 ENCOUNTER — OFFICE VISIT (OUTPATIENT)
Dept: ORTHOPEDIC SURGERY | Age: 80
End: 2025-03-18
Payer: MEDICARE

## 2025-03-18 ENCOUNTER — OFFICE VISIT (OUTPATIENT)
Dept: PRIMARY CARE CLINIC | Facility: CLINIC | Age: 80
End: 2025-03-18
Payer: MEDICARE

## 2025-03-18 VITALS
BODY MASS INDEX: 23.78 KG/M2 | SYSTOLIC BLOOD PRESSURE: 100 MMHG | WEIGHT: 148 LBS | HEIGHT: 66 IN | HEART RATE: 80 BPM | OXYGEN SATURATION: 98 % | DIASTOLIC BLOOD PRESSURE: 62 MMHG

## 2025-03-18 DIAGNOSIS — M17.0 PRIMARY OSTEOARTHRITIS OF BOTH KNEES: Primary | ICD-10-CM

## 2025-03-18 DIAGNOSIS — R73.9 HIGH BLOOD SUGAR: Primary | ICD-10-CM

## 2025-03-18 DIAGNOSIS — G62.9 NEUROPATHY: ICD-10-CM

## 2025-03-18 DIAGNOSIS — D89.2 HYPERGAMMAGLOBULINEMIA: ICD-10-CM

## 2025-03-18 DIAGNOSIS — R10.9 ABDOMINAL PAIN, UNSPECIFIED ABDOMINAL LOCATION: ICD-10-CM

## 2025-03-18 DIAGNOSIS — F32.A DEPRESSION, UNSPECIFIED DEPRESSION TYPE: ICD-10-CM

## 2025-03-18 DIAGNOSIS — G60.9 IDIOPATHIC PERIPHERAL NEUROPATHY: ICD-10-CM

## 2025-03-18 DIAGNOSIS — J31.0 NON-ALLERGIC RHINITIS: ICD-10-CM

## 2025-03-18 DIAGNOSIS — F32.0 MAJOR DEPRESSIVE DISORDER, SINGLE EPISODE, MILD: ICD-10-CM

## 2025-03-18 DIAGNOSIS — E78.5 DYSLIPIDEMIA: ICD-10-CM

## 2025-03-18 LAB
ALBUMIN SERPL-MCNC: 3.7 G/DL (ref 3.2–4.6)
ALBUMIN/GLOB SERPL: 1.2 (ref 1–1.9)
ALP SERPL-CCNC: 66 U/L (ref 35–104)
ALT SERPL-CCNC: 27 U/L (ref 8–45)
ANION GAP SERPL CALC-SCNC: 11 MMOL/L (ref 7–16)
AST SERPL-CCNC: 28 U/L (ref 15–37)
BASOPHILS # BLD: 0.04 K/UL (ref 0–0.2)
BASOPHILS NFR BLD: 0.5 % (ref 0–2)
BILIRUB SERPL-MCNC: 0.2 MG/DL (ref 0–1.2)
BUN SERPL-MCNC: 17 MG/DL (ref 8–23)
CALCIUM SERPL-MCNC: 9.5 MG/DL (ref 8.8–10.2)
CHLORIDE SERPL-SCNC: 102 MMOL/L (ref 98–107)
CHOLEST SERPL-MCNC: 140 MG/DL (ref 0–200)
CO2 SERPL-SCNC: 26 MMOL/L (ref 20–29)
CREAT SERPL-MCNC: 0.76 MG/DL (ref 0.6–1.1)
DIFFERENTIAL METHOD BLD: ABNORMAL
EOSINOPHIL # BLD: 0.15 K/UL (ref 0–0.8)
EOSINOPHIL NFR BLD: 1.7 % (ref 0.5–7.8)
ERYTHROCYTE [DISTWIDTH] IN BLOOD BY AUTOMATED COUNT: 13.2 % (ref 11.9–14.6)
EST. AVERAGE GLUCOSE BLD GHB EST-MCNC: 107 MG/DL
GLOBULIN SER CALC-MCNC: 3.1 G/DL (ref 2.3–3.5)
GLUCOSE SERPL-MCNC: 94 MG/DL (ref 70–99)
HBA1C MFR BLD: 5.4 % (ref 0–5.6)
HCT VFR BLD AUTO: 40 % (ref 35.8–46.3)
HDLC SERPL-MCNC: 37 MG/DL (ref 40–60)
HDLC SERPL: 3.8 (ref 0–5)
HGB BLD-MCNC: 12.8 G/DL (ref 11.7–15.4)
IMM GRANULOCYTES # BLD AUTO: 0.02 K/UL (ref 0–0.5)
IMM GRANULOCYTES NFR BLD AUTO: 0.2 % (ref 0–5)
LDLC SERPL CALC-MCNC: 71 MG/DL (ref 0–100)
LYMPHOCYTES # BLD: 1.77 K/UL (ref 0.5–4.6)
LYMPHOCYTES NFR BLD: 20.6 % (ref 13–44)
MCH RBC QN AUTO: 31.8 PG (ref 26.1–32.9)
MCHC RBC AUTO-ENTMCNC: 32 G/DL (ref 31.4–35)
MCV RBC AUTO: 99.5 FL (ref 82–102)
MONOCYTES # BLD: 0.62 K/UL (ref 0.1–1.3)
MONOCYTES NFR BLD: 7.2 % (ref 4–12)
NEUTS SEG # BLD: 6 K/UL (ref 1.7–8.2)
NEUTS SEG NFR BLD: 69.8 % (ref 43–78)
NRBC # BLD: 0 K/UL (ref 0–0.2)
PLATELET # BLD AUTO: 231 K/UL (ref 150–450)
PMV BLD AUTO: 10 FL (ref 9.4–12.3)
POTASSIUM SERPL-SCNC: 4.8 MMOL/L (ref 3.5–5.1)
PROT SERPL-MCNC: 6.8 G/DL (ref 6.3–8.2)
RBC # BLD AUTO: 4.02 M/UL (ref 4.05–5.2)
SODIUM SERPL-SCNC: 139 MMOL/L (ref 136–145)
TRIGL SERPL-MCNC: 161 MG/DL (ref 0–150)
VLDLC SERPL CALC-MCNC: 32 MG/DL (ref 6–23)
WBC # BLD AUTO: 8.6 K/UL (ref 4.3–11.1)

## 2025-03-18 PROCEDURE — G8420 CALC BMI NORM PARAMETERS: HCPCS | Performed by: INTERNAL MEDICINE

## 2025-03-18 PROCEDURE — G8399 PT W/DXA RESULTS DOCUMENT: HCPCS | Performed by: INTERNAL MEDICINE

## 2025-03-18 PROCEDURE — 1036F TOBACCO NON-USER: CPT | Performed by: INTERNAL MEDICINE

## 2025-03-18 PROCEDURE — 1123F ACP DISCUSS/DSCN MKR DOCD: CPT | Performed by: INTERNAL MEDICINE

## 2025-03-18 PROCEDURE — 1090F PRES/ABSN URINE INCON ASSESS: CPT | Performed by: INTERNAL MEDICINE

## 2025-03-18 PROCEDURE — 1159F MED LIST DOCD IN RCRD: CPT | Performed by: INTERNAL MEDICINE

## 2025-03-18 PROCEDURE — 99214 OFFICE O/P EST MOD 30 MIN: CPT | Performed by: INTERNAL MEDICINE

## 2025-03-18 PROCEDURE — 20610 DRAIN/INJ JOINT/BURSA W/O US: CPT | Performed by: ORTHOPAEDIC SURGERY

## 2025-03-18 PROCEDURE — G8427 DOCREV CUR MEDS BY ELIG CLIN: HCPCS | Performed by: INTERNAL MEDICINE

## 2025-03-18 PROCEDURE — G2211 COMPLEX E/M VISIT ADD ON: HCPCS | Performed by: INTERNAL MEDICINE

## 2025-03-18 RX ORDER — IPRATROPIUM BROMIDE 21 UG/1
2 SPRAY, METERED NASAL EVERY 12 HOURS
Qty: 30 ML | Refills: 3 | Status: SHIPPED | OUTPATIENT
Start: 2025-03-18 | End: 2025-03-18 | Stop reason: SDUPTHER

## 2025-03-18 RX ORDER — SERTRALINE HYDROCHLORIDE 100 MG/1
100 TABLET, FILM COATED ORAL NIGHTLY
Qty: 90 TABLET | Refills: 1 | Status: SHIPPED | OUTPATIENT
Start: 2025-03-18 | End: 2025-03-18 | Stop reason: ALTCHOICE

## 2025-03-18 RX ORDER — SERTRALINE HYDROCHLORIDE 100 MG/1
200 TABLET, FILM COATED ORAL DAILY
Qty: 180 TABLET | Refills: 1 | Status: SHIPPED | OUTPATIENT
Start: 2025-03-18 | End: 2025-03-18 | Stop reason: SDUPTHER

## 2025-03-18 RX ORDER — GABAPENTIN 300 MG/1
300 CAPSULE ORAL 2 TIMES DAILY
Qty: 180 CAPSULE | Refills: 1 | Status: SHIPPED | OUTPATIENT
Start: 2025-03-18 | End: 2025-03-18 | Stop reason: SDUPTHER

## 2025-03-18 RX ORDER — FLUTICASONE PROPIONATE 50 MCG
2 SPRAY, SUSPENSION (ML) NASAL DAILY
Qty: 16 G | Refills: 5 | Status: SHIPPED | OUTPATIENT
Start: 2025-03-18 | End: 2025-03-18 | Stop reason: ALTCHOICE

## 2025-03-18 SDOH — ECONOMIC STABILITY: FOOD INSECURITY: WITHIN THE PAST 12 MONTHS, THE FOOD YOU BOUGHT JUST DIDN'T LAST AND YOU DIDN'T HAVE MONEY TO GET MORE.: NEVER TRUE

## 2025-03-18 SDOH — ECONOMIC STABILITY: FOOD INSECURITY: WITHIN THE PAST 12 MONTHS, YOU WORRIED THAT YOUR FOOD WOULD RUN OUT BEFORE YOU GOT MONEY TO BUY MORE.: NEVER TRUE

## 2025-03-18 ASSESSMENT — PATIENT HEALTH QUESTIONNAIRE - PHQ9
2. FEELING DOWN, DEPRESSED OR HOPELESS: SEVERAL DAYS
3. TROUBLE FALLING OR STAYING ASLEEP: NEARLY EVERY DAY
7. TROUBLE CONCENTRATING ON THINGS, SUCH AS READING THE NEWSPAPER OR WATCHING TELEVISION: NOT AT ALL
10. IF YOU CHECKED OFF ANY PROBLEMS, HOW DIFFICULT HAVE THESE PROBLEMS MADE IT FOR YOU TO DO YOUR WORK, TAKE CARE OF THINGS AT HOME, OR GET ALONG WITH OTHER PEOPLE: NOT DIFFICULT AT ALL
SUM OF ALL RESPONSES TO PHQ QUESTIONS 1-9: 8
6. FEELING BAD ABOUT YOURSELF - OR THAT YOU ARE A FAILURE OR HAVE LET YOURSELF OR YOUR FAMILY DOWN: NOT AT ALL
SUM OF ALL RESPONSES TO PHQ QUESTIONS 1-9: 8
8. MOVING OR SPEAKING SO SLOWLY THAT OTHER PEOPLE COULD HAVE NOTICED. OR THE OPPOSITE, BEING SO FIGETY OR RESTLESS THAT YOU HAVE BEEN MOVING AROUND A LOT MORE THAN USUAL: NOT AT ALL
5. POOR APPETITE OR OVEREATING: NOT AT ALL
1. LITTLE INTEREST OR PLEASURE IN DOING THINGS: NEARLY EVERY DAY
4. FEELING TIRED OR HAVING LITTLE ENERGY: SEVERAL DAYS
9. THOUGHTS THAT YOU WOULD BE BETTER OFF DEAD, OR OF HURTING YOURSELF: NOT AT ALL
SUM OF ALL RESPONSES TO PHQ QUESTIONS 1-9: 8
SUM OF ALL RESPONSES TO PHQ QUESTIONS 1-9: 8

## 2025-03-18 ASSESSMENT — ENCOUNTER SYMPTOMS: RESPIRATORY NEGATIVE: 1

## 2025-03-18 NOTE — PROGRESS NOTES
Diagnosis: Bilateral knee arthritis    Patient present today for the 2nd viscosupplimentation injection in the knees. Prior injections have been tolerated well.    Risks, benefits, and alteratives were discussed with patient prior to injection.  A timeout was performed which included identifying the patient by name and date of birth, verifying correct procedure and correct site(s).     Under sterile technique, the knees were injected with Euflexxa (2ml).  The patient tolerated the procedure well.    Patient is advised to ice the knee over the next week or so.  Continue taking prior oral medications. Patient will follow-up as directed for the final injection in the series.

## 2025-03-18 NOTE — TELEPHONE ENCOUNTER
Patient will like for all the following gabapentin ,ipratropium ,sertraline to go   [facility] [place], [place]

## 2025-03-18 NOTE — PROGRESS NOTES
FOLLOW UP VISIT    Subjective:    Viv Kennedy (: 1945) is a 80 y.o., female,   Chief Complaint   Patient presents with    6 Month Follow-Up       HPI:  HPI    History of Present Illness  The patient presents for evaluation of abdominal pain, depression, and cholesterol management.    She reports experiencing mild abdominal discomfort.    She expresses dissatisfaction with the current dosage of Zoloft, which is 150 mg, and is contemplating an increase in the dosage.    She has been prescribed Repatha by Dr. Sommer and has administered one injection to date.    She requests a refill of Atrovent nasal spray but not Flonase.    MEDICATIONS  Current: Atrovent nasal spray, Zoloft, Repatha     The following portions of the patient's history were reviewed and updated as appropriate:      Past Medical History:   Diagnosis Date    A-fib (HCC) 2012    Abnormal Pap smear of cervix     Acute anemia     unknown bleed    Arthritis     Atrial fibrillation (HCC) 10 years ago    Dyslipidemia 2013    GERD (gastroesophageal reflux disease)     Heart failure ([facility])     a fib    History of blood transfusion     History of pulmonary embolism     after hysterectomy    Hypercholesterolemia     Insomnia 2015    Menopause     CHERYL (obstructive sleep apnea) 6/15/2012    Osteoarthritis 5 years ago    PAF (paroxysmal atrial fibrillation) ([facility]) 6/15/2012    PE (pulmonary thromboembolism) ([facility])     age 39 years after hysterectomy    Persistent atrial fibrillation ([facility]) 2014    Typical atrial flutter ([facility])        Past Surgical History:   Procedure Laterality Date    ABLATION OF DYSRHYTHMIC FOCUS  2014    Heart Ablation    CATARACT EXTRACTION       SECTION      x2    CHOLECYSTECTOMY      COLONOSCOPY      CYST REMOVAL      HAMMER TOE SURGERY Right 2022    right second, third, fourth PIP(proximal interphalangeal) resection arthroplasties   28285x3 performed by Manjinder Bruner III, MD at [facility]

## 2025-03-19 ENCOUNTER — RESULTS FOLLOW-UP (OUTPATIENT)
Dept: PRIMARY CARE CLINIC | Facility: CLINIC | Age: 80
End: 2025-03-19

## 2025-03-19 RX ORDER — GABAPENTIN 300 MG/1
300 CAPSULE ORAL 2 TIMES DAILY
Qty: 180 CAPSULE | Refills: 1 | Status: SHIPPED | OUTPATIENT
Start: 2025-03-19 | End: 2025-09-15

## 2025-03-19 RX ORDER — SERTRALINE HYDROCHLORIDE 100 MG/1
200 TABLET, FILM COATED ORAL DAILY
Qty: 180 TABLET | Refills: 1 | Status: SHIPPED | OUTPATIENT
Start: 2025-03-19

## 2025-03-19 RX ORDER — IPRATROPIUM BROMIDE 21 UG/1
2 SPRAY, METERED NASAL EVERY 12 HOURS
Qty: 30 ML | Refills: 3 | Status: SHIPPED | OUTPATIENT
Start: 2025-03-19

## 2025-03-24 ENCOUNTER — TRANSCRIBE ORDERS (OUTPATIENT)
Dept: SCHEDULING | Age: 80
End: 2025-03-24

## 2025-03-24 ENCOUNTER — PATIENT MESSAGE (OUTPATIENT)
Dept: PRIMARY CARE CLINIC | Facility: CLINIC | Age: 80
End: 2025-03-24

## 2025-03-24 DIAGNOSIS — Z12.31 VISIT FOR SCREENING MAMMOGRAM: Primary | ICD-10-CM

## 2025-03-24 DIAGNOSIS — K21.9 GASTROESOPHAGEAL REFLUX DISEASE WITHOUT ESOPHAGITIS: ICD-10-CM

## 2025-03-24 RX ORDER — ESOMEPRAZOLE MAGNESIUM 40 MG/1
40 CAPSULE, DELAYED RELEASE ORAL
Qty: 90 CAPSULE | Refills: 1 | Status: SHIPPED | OUTPATIENT
Start: 2025-03-24

## 2025-03-25 ENCOUNTER — OFFICE VISIT (OUTPATIENT)
Dept: ORTHOPEDIC SURGERY | Age: 80
End: 2025-03-25
Payer: MEDICARE

## 2025-03-25 DIAGNOSIS — M17.0 PRIMARY OSTEOARTHRITIS OF BOTH KNEES: Primary | ICD-10-CM

## 2025-03-25 PROCEDURE — 20610 DRAIN/INJ JOINT/BURSA W/O US: CPT | Performed by: NURSE PRACTITIONER

## 2025-03-25 NOTE — PROGRESS NOTES
Diagnosis: Bilateral knee arthritis    Patient present today for the third and final viscosupplimentation injection in the knees. Prior injections have been tolerated well.    Risks, benefits, and alteratives were discussed with patient prior to injection.  A timeout was performed which included identifying the patient by name and date of birth, verifying correct procedure and correct site(s).     Under sterile technique, the knees were injected with Euflexxa (2ml).  The patient tolerated the procedure well.    This is the third and final injection of the series.  Again the patient is advised to ice the knee over the next week or so.  Continue taking prior oral medications.   The patient can follow-up with me as needed.      MARIO LEAL, APRN - CNP

## 2025-03-28 ENCOUNTER — HOSPITAL ENCOUNTER (OUTPATIENT)
Dept: ULTRASOUND IMAGING | Age: 80
Discharge: HOME | End: 2025-03-31
Payer: MEDICARE

## 2025-03-28 DIAGNOSIS — R10.9 ABDOMINAL PAIN, UNSPECIFIED ABDOMINAL LOCATION: ICD-10-CM

## 2025-03-28 PROCEDURE — 76700 US EXAM ABDOM COMPLETE: CPT

## 2025-03-31 ENCOUNTER — TELEMEDICINE (OUTPATIENT)
Dept: PRIMARY CARE CLINIC | Facility: CLINIC | Age: 80
End: 2025-03-31

## 2025-03-31 ENCOUNTER — RESULTS FOLLOW-UP (OUTPATIENT)
Dept: PRIMARY CARE CLINIC | Facility: CLINIC | Age: 80
End: 2025-03-31

## 2025-03-31 DIAGNOSIS — G47.33 OSA (OBSTRUCTIVE SLEEP APNEA): ICD-10-CM

## 2025-03-31 DIAGNOSIS — D89.2 HYPERGAMMAGLOBULINEMIA: ICD-10-CM

## 2025-03-31 DIAGNOSIS — K21.9 GASTROESOPHAGEAL REFLUX DISEASE WITHOUT ESOPHAGITIS: Primary | ICD-10-CM

## 2025-03-31 DIAGNOSIS — R73.9 HIGH BLOOD SUGAR: ICD-10-CM

## 2025-03-31 DIAGNOSIS — R51.9 NONINTRACTABLE EPISODIC HEADACHE, UNSPECIFIED HEADACHE TYPE: ICD-10-CM

## 2025-03-31 DIAGNOSIS — I48.19 PERSISTENT ATRIAL FIBRILLATION (HCC): ICD-10-CM

## 2025-03-31 DIAGNOSIS — N28.1 RENAL CYST: ICD-10-CM

## 2025-03-31 DIAGNOSIS — J01.90 ACUTE SINUSITIS, RECURRENCE NOT SPECIFIED, UNSPECIFIED LOCATION: ICD-10-CM

## 2025-03-31 DIAGNOSIS — F32.A DEPRESSION, UNSPECIFIED DEPRESSION TYPE: ICD-10-CM

## 2025-03-31 DIAGNOSIS — E55.9 VITAMIN D DEFICIENCY: ICD-10-CM

## 2025-03-31 DIAGNOSIS — J31.0 NON-ALLERGIC RHINITIS: ICD-10-CM

## 2025-03-31 DIAGNOSIS — I48.0 PAROXYSMAL ATRIAL FIBRILLATION (HCC): ICD-10-CM

## 2025-03-31 DIAGNOSIS — G60.9 IDIOPATHIC PERIPHERAL NEUROPATHY: ICD-10-CM

## 2025-03-31 DIAGNOSIS — R10.9 ABDOMINAL PAIN, UNSPECIFIED ABDOMINAL LOCATION: ICD-10-CM

## 2025-03-31 DIAGNOSIS — G62.9 NEUROPATHY: ICD-10-CM

## 2025-03-31 DIAGNOSIS — E78.5 DYSLIPIDEMIA: ICD-10-CM

## 2025-03-31 DIAGNOSIS — F32.0 MAJOR DEPRESSIVE DISORDER, SINGLE EPISODE, MILD: ICD-10-CM

## 2025-03-31 DIAGNOSIS — D68.69 SECONDARY HYPERCOAGULABLE STATE: ICD-10-CM

## 2025-03-31 DIAGNOSIS — M85.80 OSTEOPENIA, UNSPECIFIED LOCATION: ICD-10-CM

## 2025-03-31 NOTE — PROGRESS NOTES
FOLLOW UP VISIT    Subjective:    Viv Kennedy (: 1945) is a 80 y.o., female,   Chief Complaint   Patient presents with    Discuss Labs     Labs and US       HPI:  HPI    History of Present Illness  The patient presents via virtual visit for evaluation of an abdominal ultrasound.    Currently under the care of a gastroenterologist, Dr. Herron, she has been informed of a potential need for an MRCP, a procedure typically performed by a gastroenterologist. Additionally, a renal cyst has been diagnosed, necessitating a follow-up ultrasound in approximately 6 months.    Recent lab results indicate improved cholesterol levels, with LDL cholesterol decreasing from 161 to 71. Liver function tests and white blood cell count are reported as normal. She attributes the improvement in cholesterol to the medication Repatha and dietary changes.     The following portions of the patient's history were reviewed and updated as appropriate:      Past Medical History:   Diagnosis Date    A-fib (HCC) 2012    Abnormal Pap smear of cervix     Acute anemia     unknown bleed    Arthritis     Atrial fibrillation (HCC) 10 years ago    Dyslipidemia 2013    GERD (gastroesophageal reflux disease)     Heart failure (HCC)     a fib    History of blood transfusion     History of pulmonary embolism     after hysterectomy    Hypercholesterolemia     Insomnia 2015    Menopause     CHERYL (obstructive sleep apnea) 6/15/2012    Osteoarthritis 5 years ago    PAF (paroxysmal atrial fibrillation) (HCC) 6/15/2012    PE (pulmonary thromboembolism) (HCC)     age 39 years after hysterectomy    Persistent atrial fibrillation (HCC) 2014    Typical atrial flutter (HCC)        Past Surgical History:   Procedure Laterality Date    ABLATION OF DYSRHYTHMIC FOCUS  2014    Heart Ablation    CATARACT EXTRACTION       SECTION      x2    CHOLECYSTECTOMY      COLONOSCOPY      CYST REMOVAL      HAMMER TOE SURGERY Right

## 2025-04-15 ENCOUNTER — OFFICE VISIT (OUTPATIENT)
Dept: PRIMARY CARE CLINIC | Facility: CLINIC | Age: 80
End: 2025-04-15
Payer: MEDICARE

## 2025-04-15 VITALS — BODY MASS INDEX: 23.75 KG/M2 | WEIGHT: 147.8 LBS | HEIGHT: 66 IN

## 2025-04-15 DIAGNOSIS — F32.0 MAJOR DEPRESSIVE DISORDER, SINGLE EPISODE, MILD: Primary | ICD-10-CM

## 2025-04-15 PROCEDURE — 1123F ACP DISCUSS/DSCN MKR DOCD: CPT | Performed by: INTERNAL MEDICINE

## 2025-04-15 PROCEDURE — 1090F PRES/ABSN URINE INCON ASSESS: CPT | Performed by: INTERNAL MEDICINE

## 2025-04-15 PROCEDURE — G8420 CALC BMI NORM PARAMETERS: HCPCS | Performed by: INTERNAL MEDICINE

## 2025-04-15 PROCEDURE — G8399 PT W/DXA RESULTS DOCUMENT: HCPCS | Performed by: INTERNAL MEDICINE

## 2025-04-15 PROCEDURE — 1036F TOBACCO NON-USER: CPT | Performed by: INTERNAL MEDICINE

## 2025-04-15 PROCEDURE — G8427 DOCREV CUR MEDS BY ELIG CLIN: HCPCS | Performed by: INTERNAL MEDICINE

## 2025-04-15 PROCEDURE — 99214 OFFICE O/P EST MOD 30 MIN: CPT | Performed by: INTERNAL MEDICINE

## 2025-04-15 PROCEDURE — 1159F MED LIST DOCD IN RCRD: CPT | Performed by: INTERNAL MEDICINE

## 2025-04-15 ASSESSMENT — ENCOUNTER SYMPTOMS: RESPIRATORY NEGATIVE: 1

## 2025-04-15 NOTE — PROGRESS NOTES
FOLLOW UP VISIT    Subjective:    Viv Kennedy (: 1945) is a 80 y.o., female,   Chief Complaint   Patient presents with    Follow-up       HPI:  HPI  History of Present Illness  The patient presents for a follow-up on Zoloft.    She reports an improvement in her condition since starting Zoloft. Current medications include Nexium for reflux, gabapentin, Atrovent, Zoloft, and Repatha. An upcoming appointment with a gastroenterologist is scheduled for 2025, during which an endoscopy is planned. Cholesterol levels have shown significant improvement. Blood work is advised to be repeated 90 days post the initiation of Repatha.       The following portions of the patient's history were reviewed and updated as appropriate:      Past Medical History:   Diagnosis Date    A-fib (HCC) 2012    Abnormal Pap smear of cervix     Acute anemia     unknown bleed    Arthritis     Atrial fibrillation (HCC) 10 years ago    Dyslipidemia 2013    GERD (gastroesophageal reflux disease)     Heart failure (HCC)     a fib    History of blood transfusion     History of pulmonary embolism     after hysterectomy    Hypercholesterolemia     Insomnia 2015    Menopause     CHERYL (obstructive sleep apnea) 6/15/2012    Osteoarthritis 5 years ago    PAF (paroxysmal atrial fibrillation) ([facility]) 6/15/2012    PE (pulmonary thromboembolism) ([facility])     age 39 years after hysterectomy    Persistent atrial fibrillation (HCC) 2014    Typical atrial flutter ([facility])        Past Surgical History:   Procedure Laterality Date    ABLATION OF DYSRHYTHMIC FOCUS  2014    Heart Ablation    CATARACT EXTRACTION       SECTION      x2    CHOLECYSTECTOMY      COLONOSCOPY      CYST REMOVAL      HAMMER TOE SURGERY Right 2022    right second, third, fourth PIP(proximal interphalangeal) resection arthroplasties   28285x3 performed by Manjinder Bruner III, MD at [facility]    HYSTERECTOMY (CERVIX STATUS UNKNOWN)

## 2025-04-19 ENCOUNTER — HOSPITAL ENCOUNTER (OUTPATIENT)
Dept: MAMMOGRAPHY | Age: 80
Discharge: HOME | End: 2025-04-22
Attending: INTERNAL MEDICINE
Payer: MEDICARE

## 2025-04-19 DIAGNOSIS — Z12.31 VISIT FOR SCREENING MAMMOGRAM: ICD-10-CM

## 2025-04-19 PROCEDURE — 77063 BREAST TOMOSYNTHESIS BI: CPT

## 2025-05-01 ENCOUNTER — RESULTS FOLLOW-UP (OUTPATIENT)
Dept: PRIMARY CARE CLINIC | Facility: CLINIC | Age: 80
End: 2025-05-01

## 2025-05-02 ENCOUNTER — PATIENT MESSAGE (OUTPATIENT)
Age: 80
End: 2025-05-02

## 2025-05-04 RX ORDER — METOPROLOL SUCCINATE 50 MG/1
50 TABLET, EXTENDED RELEASE ORAL 2 TIMES DAILY
Qty: 180 TABLET | Refills: 3 | Status: SHIPPED | OUTPATIENT
Start: 2025-05-04

## 2025-05-20 ENCOUNTER — HOSPITAL ENCOUNTER (OUTPATIENT)
Dept: MAMMOGRAPHY | Age: 80
Discharge: HOME | End: 2025-05-23
Attending: INTERNAL MEDICINE
Payer: MEDICARE

## 2025-05-20 DIAGNOSIS — R92.8 ABNORMAL MAMMOGRAM: ICD-10-CM

## 2025-05-20 PROCEDURE — G0279 TOMOSYNTHESIS, MAMMO: HCPCS

## 2025-05-20 PROCEDURE — 76642 ULTRASOUND BREAST LIMITED: CPT

## 2025-05-21 ENCOUNTER — RESULTS FOLLOW-UP (OUTPATIENT)
Dept: PRIMARY CARE CLINIC | Facility: CLINIC | Age: 80
End: 2025-05-21

## 2025-06-17 ENCOUNTER — OFFICE VISIT (OUTPATIENT)
Dept: ORTHOPEDIC SURGERY | Age: 80
End: 2025-06-17

## 2025-06-17 DIAGNOSIS — M17.0 PRIMARY OSTEOARTHRITIS OF BOTH KNEES: Primary | ICD-10-CM

## 2025-06-17 RX ORDER — METHYLPREDNISOLONE ACETATE 40 MG/ML
40 INJECTION, SUSPENSION INTRA-ARTICULAR; INTRALESIONAL; INTRAMUSCULAR; SOFT TISSUE ONCE
Status: COMPLETED | OUTPATIENT
Start: 2025-06-17 | End: 2025-06-17

## 2025-06-17 RX ADMIN — METHYLPREDNISOLONE ACETATE 40 MG: 40 INJECTION, SUSPENSION INTRA-ARTICULAR; INTRALESIONAL; INTRAMUSCULAR; SOFT TISSUE at 13:06

## 2025-06-17 RX ADMIN — METHYLPREDNISOLONE ACETATE 40 MG: 40 INJECTION, SUSPENSION INTRA-ARTICULAR; INTRALESIONAL; INTRAMUSCULAR; SOFT TISSUE at 13:05

## 2025-06-17 NOTE — PROGRESS NOTES
Patient ID:  Viv Kennedy  [id number]  80 y.o.  1945    Today: June 17, 2025          Chief Complaint:  Bilateral Knee pain    HPI:       Viv Kennedy is a 80 y.o. female  that presents today for followup of bilateral knee pain. The patient has previously been evaluated and treated for this problem. The patient complains of knee pain with activities, reports stiffness of the knee with prolonged inactivity, and swelling/pain at the end of the day and after increased physical activity.  Pain is described as a general ache with occasional sharp pain, primarily along the joint lines. The pain is rated as ranging from 3-8 with periods of acute exacerbation and periods with less severe pain. Generally, symptoms improve with sitting/rest. The pain affects the patient’s activities of daily living and quality of life. Patient reports progressive pain and instability in the knee.     Patient has attempted prior conservative treatment including over-the-counter medications and activity modification. Patient has previously had an intra-articular steroid injection which at one point has provided 3+ months of pain relief.    Past Medical History:  Past Medical History:   Diagnosis Date    A-fib (formerly [facility]) 7/24/2012    Abnormal Pap smear of cervix     Acute anemia     unknown bleed    Arthritis     Atrial fibrillation (formerly [facility]) 10 years ago    Dyslipidemia 4/13/2013    GERD (gastroesophageal reflux disease)     Heart failure (formerly [facility])     a fib    History of blood transfusion     History of pulmonary embolism     after hysterectomy    Hypercholesterolemia     Insomnia 11/17/2015    Menopause     CHERYL (obstructive sleep apnea) 6/15/2012    Osteoarthritis 5 years ago    PAF (paroxysmal atrial fibrillation) (formerly [facility]) 6/15/2012    PE (pulmonary thromboembolism) (formerly [facility])     age 39 years after hysterectomy    Persistent atrial fibrillation (formerly [facility]) 5/19/2014    Typical atrial flutter (formerly [facility])        Past Surgical History:  Past

## 2025-06-17 NOTE — PATIENT INSTRUCTIONS
Learning About Joint Injections  What are joint injections?     Joint injections are shots into a joint, such as the knee or shoulder. They are used to put in medicines, such as pain relievers and steroid medicines. Steroids can help reduce inflammation. A steroid shot can sometimes help with short-term pain relief when other treatments haven't worked.  How are they done?  First, the area over the joint will be cleaned. Your doctor may then use a tiny needle to numb the skin in the area where you will get the joint injection.  If a tiny needle is used to numb the area, your doctor will use another needle to inject the medicine. Your doctor may use a pain reliever, a steroid, or both. You may feel some pressure or discomfort.  Your doctor may put ice on the area before you go home.  What can you expect after a joint injection?  You will probably go home soon after your shot. You may have numbness around the joint for a few hours.  If your shot included both a pain reliever and a steroid, then the pain will probably go away right away. But it might come back after a few hours. This might happen if the pain reliever wears off and the steroid hasn't started to work yet. Steroids don't always work. But when they do, the pain relief can last for several days to a few months or longer.  Your doctor may tell you to use ice on the area. You can also use ice if the pain comes back. Put ice or a cold pack on your joint for 10 to 20 minutes at a time. Put a thin cloth between the ice and your skin.  Follow your doctor's instructions carefully.  Follow-up care is a key part of your treatment and safety. Be sure to make and go to all appointments, and call your doctor if you are having problems. It's also a good idea to know your test results and keep a list of the medicines you take.  Current as of: July 31, 2024  Content Version: 14.5  © 2024-2025 [email or web address].   Care instructions adapted under license by [facility]

## 2025-07-15 ENCOUNTER — PATIENT MESSAGE (OUTPATIENT)
Age: 80
End: 2025-07-15

## 2025-07-21 DIAGNOSIS — E78.5 DYSLIPIDEMIA: ICD-10-CM

## 2025-07-21 DIAGNOSIS — I48.0 PAROXYSMAL ATRIAL FIBRILLATION (HCC): ICD-10-CM

## 2025-07-21 LAB
ALBUMIN SERPL-MCNC: 3.7 G/DL (ref 3.2–4.6)
ALBUMIN/GLOB SERPL: 1.2 (ref 1–1.9)
ALP SERPL-CCNC: 61 U/L (ref 35–104)
ALT SERPL-CCNC: 29 U/L (ref 8–45)
AST SERPL-CCNC: 33 U/L (ref 15–37)
BILIRUB DIRECT SERPL-MCNC: 0.1 MG/DL (ref 0–0.3)
BILIRUB SERPL-MCNC: 0.3 MG/DL (ref 0–1.2)
CHOLEST SERPL-MCNC: 149 MG/DL (ref 0–200)
GLOBULIN SER CALC-MCNC: 3 G/DL (ref 2.3–3.5)
HDLC SERPL-MCNC: 49 MG/DL (ref 40–60)
HDLC SERPL: 3 (ref 0–5)
LDLC SERPL CALC-MCNC: 75 MG/DL (ref 0–100)
PROT SERPL-MCNC: 6.7 G/DL (ref 6.3–8.2)
TRIGL SERPL-MCNC: 123 MG/DL (ref 0–150)
VLDLC SERPL CALC-MCNC: 25 MG/DL (ref 6–23)

## 2025-08-18 ENCOUNTER — OFFICE VISIT (OUTPATIENT)
Age: 80
End: 2025-08-18
Payer: MEDICARE

## 2025-08-18 VITALS
WEIGHT: 148 LBS | SYSTOLIC BLOOD PRESSURE: 132 MMHG | HEART RATE: 80 BPM | HEIGHT: 68 IN | BODY MASS INDEX: 22.43 KG/M2 | DIASTOLIC BLOOD PRESSURE: 70 MMHG

## 2025-08-18 DIAGNOSIS — D68.69 SECONDARY HYPERCOAGULABLE STATE: ICD-10-CM

## 2025-08-18 DIAGNOSIS — I48.19 PERSISTENT ATRIAL FIBRILLATION (HCC): Primary | ICD-10-CM

## 2025-08-18 DIAGNOSIS — G47.33 OSA (OBSTRUCTIVE SLEEP APNEA): ICD-10-CM

## 2025-08-18 DIAGNOSIS — E78.5 DYSLIPIDEMIA: ICD-10-CM

## 2025-08-18 PROCEDURE — G8427 DOCREV CUR MEDS BY ELIG CLIN: HCPCS | Performed by: INTERNAL MEDICINE

## 2025-08-18 PROCEDURE — 1123F ACP DISCUSS/DSCN MKR DOCD: CPT | Performed by: INTERNAL MEDICINE

## 2025-08-18 PROCEDURE — G8399 PT W/DXA RESULTS DOCUMENT: HCPCS | Performed by: INTERNAL MEDICINE

## 2025-08-18 PROCEDURE — 1090F PRES/ABSN URINE INCON ASSESS: CPT | Performed by: INTERNAL MEDICINE

## 2025-08-18 PROCEDURE — 1036F TOBACCO NON-USER: CPT | Performed by: INTERNAL MEDICINE

## 2025-08-18 PROCEDURE — 99214 OFFICE O/P EST MOD 30 MIN: CPT | Performed by: INTERNAL MEDICINE

## 2025-08-18 PROCEDURE — 1159F MED LIST DOCD IN RCRD: CPT | Performed by: INTERNAL MEDICINE

## 2025-08-18 PROCEDURE — G8420 CALC BMI NORM PARAMETERS: HCPCS | Performed by: INTERNAL MEDICINE

## 2025-08-18 RX ORDER — METOPROLOL SUCCINATE 50 MG/1
50 TABLET, EXTENDED RELEASE ORAL 2 TIMES DAILY
Qty: 180 TABLET | Refills: 3 | Status: SHIPPED | OUTPATIENT
Start: 2025-08-18

## 2025-09-06 ENCOUNTER — APPOINTMENT (OUTPATIENT)
Dept: CT IMAGING | Age: 80
End: 2025-09-06
Payer: MEDICARE

## 2025-09-06 ENCOUNTER — HOSPITAL ENCOUNTER (EMERGENCY)
Age: 80
Discharge: HOME | End: 2025-09-06
Payer: MEDICARE

## 2025-09-06 VITALS
WEIGHT: 142 LBS | BODY MASS INDEX: 22.82 KG/M2 | RESPIRATION RATE: 16 BRPM | DIASTOLIC BLOOD PRESSURE: 71 MMHG | HEIGHT: 66 IN | HEART RATE: 85 BPM | OXYGEN SATURATION: 94 % | TEMPERATURE: 98.4 F | SYSTOLIC BLOOD PRESSURE: 123 MMHG

## 2025-09-06 DIAGNOSIS — S09.90XA INJURY OF HEAD, INITIAL ENCOUNTER: Primary | ICD-10-CM
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
## 2025-09-06 DIAGNOSIS — S00.03XA CONTUSION OF RIGHT TEMPOROFRONTAL SCALP, INITIAL ENCOUNTER: ICD-10-CM

## 2025-09-06 PROCEDURE — 70450 CT HEAD/BRAIN W/O DYE: CPT

## 2025-09-06 PROCEDURE — 6370000000 HC RX 637 (ALT 250 FOR IP): Performed by: NURSE PRACTITIONER

## 2025-09-06 RX ORDER — ONDANSETRON 4 MG/1
4 TABLET, ORALLY DISINTEGRATING ORAL ONCE
Status: COMPLETED | OUTPATIENT
Start: 2025-09-06 | End: 2025-09-06

## 2025-09-06 RX ORDER — HYDROCODONE BITARTRATE AND ACETAMINOPHEN 5; 325 MG/1; MG/1
0.5 TABLET ORAL
Refills: 0 | Status: COMPLETED | OUTPATIENT
Start: 2025-09-06 | End: 2025-09-06

## 2025-09-06 RX ADMIN — HYDROCODONE BITARTRATE AND ACETAMINOPHEN 0.5 TABLET: 5; 325 TABLET ORAL at 16:14

## 2025-09-06 RX ADMIN — ONDANSETRON 4 MG: 4 TABLET, ORALLY DISINTEGRATING ORAL at 16:15

## 2025-09-06 ASSESSMENT — PAIN DESCRIPTION - PAIN TYPE: TYPE: ACUTE PAIN

## 2025-09-06 ASSESSMENT — PAIN SCALES - GENERAL: PAINLEVEL_OUTOF10: 10

## 2025-09-06 ASSESSMENT — PAIN DESCRIPTION - LOCATION: LOCATION: HEAD

## 2025-09-06 ASSESSMENT — PAIN DESCRIPTION - DESCRIPTORS: DESCRIPTORS: ACHING

## 2025-09-06 ASSESSMENT — PAIN - FUNCTIONAL ASSESSMENT: PAIN_FUNCTIONAL_ASSESSMENT: 0-10

## (undated) DEVICE — SUTURE VCRL SZ 2-0 L27IN ABSRB UD L36MM CP-1 1/2 CIR REV J266H

## (undated) DEVICE — SOLUTION IRRIG 1000ML 09% SOD CHL USP PIC PLAS CONTAINER

## (undated) DEVICE — PRECISION THIN, OFFSET (5.5 X 0.38 X 25.0MM)

## (undated) DEVICE — FOOT & ANKLE SOFT DR WOMACK: Brand: MEDLINE INDUSTRIES, INC.

## (undated) DEVICE — GLOVE SURG SZ 65 CRM LTX FREE POLYISOPRENE POLYMER BEAD ANTI

## (undated) DEVICE — GLOVE SURG SZ 65 L12IN FNGR THK79MIL GRN LTX FREE

## (undated) DEVICE — GLOVE SURG SZ 8 L12IN FNGR THK79MIL GRN LTX FREE

## (undated) DEVICE — BANDAGE,GAUZE,CONFORMING,2"X75",STRL,LF: Brand: MEDLINE INDUSTRIES, INC.

## (undated) DEVICE — ZIMMER® STERILE DISPOSABLE TOURNIQUET CUFF WITH PLC, DUAL PORT, SINGLE BLADDER, 18 IN. (46 CM)

## (undated) DEVICE — SUTURE VCRL SZ 2-0 L27IN ABSRB UD L26MM CT-2 1/2 CIR J269H

## (undated) DEVICE — INTENDED TO BE USED TO OCCLUDE, RETRACT AND IDENTIFY ARTERIES, VEINS, TENDONS AND NERVES IN SURGICAL PROCEDURES: Brand: STERION®  VESSEL LOOP

## (undated) DEVICE — BNDG,ELSTC,MATRIX,STRL,3"X5YD,LF,HOOK&LP: Brand: MEDLINE

## (undated) DEVICE — GOWN,SIRUS,NONRNF,SETINSLV,XL,20/CS: Brand: MEDLINE

## (undated) DEVICE — PRECISION THIN (16.5 X 0.38 X 34.5MM)